# Patient Record
Sex: FEMALE | Race: WHITE | NOT HISPANIC OR LATINO | URBAN - METROPOLITAN AREA
[De-identification: names, ages, dates, MRNs, and addresses within clinical notes are randomized per-mention and may not be internally consistent; named-entity substitution may affect disease eponyms.]

---

## 2017-03-21 ENCOUNTER — APPOINTMENT (RX ONLY)
Dept: URBAN - METROPOLITAN AREA CLINIC 23 | Facility: CLINIC | Age: 57
Setting detail: DERMATOLOGY
End: 2017-03-21

## 2017-03-21 DIAGNOSIS — D485 NEOPLASM OF UNCERTAIN BEHAVIOR OF SKIN: ICD-10-CM

## 2017-03-21 DIAGNOSIS — L82.1 OTHER SEBORRHEIC KERATOSIS: ICD-10-CM

## 2017-03-21 DIAGNOSIS — L82.0 INFLAMED SEBORRHEIC KERATOSIS: ICD-10-CM

## 2017-03-21 DIAGNOSIS — L91.8 OTHER HYPERTROPHIC DISORDERS OF THE SKIN: ICD-10-CM

## 2017-03-21 PROBLEM — J45.909 UNSPECIFIED ASTHMA, UNCOMPLICATED: Status: ACTIVE | Noted: 2017-03-21

## 2017-03-21 PROBLEM — D48.5 NEOPLASM OF UNCERTAIN BEHAVIOR OF SKIN: Status: ACTIVE | Noted: 2017-03-21

## 2017-03-21 PROBLEM — J30.1 ALLERGIC RHINITIS DUE TO POLLEN: Status: ACTIVE | Noted: 2017-03-21

## 2017-03-21 PROCEDURE — ? COUNSELING

## 2017-03-21 PROCEDURE — ? REFERRAL

## 2017-03-21 PROCEDURE — ? DEFER

## 2017-03-21 PROCEDURE — 99242 OFF/OP CONSLTJ NEW/EST SF 20: CPT

## 2017-03-21 ASSESSMENT — LOCATION DETAILED DESCRIPTION DERM
LOCATION DETAILED: RIGHT INFERIOR ANTERIOR NECK
LOCATION DETAILED: LEFT INFERIOR ANTERIOR NECK
LOCATION DETAILED: RIGHT LATERAL FOREHEAD

## 2017-03-21 ASSESSMENT — LOCATION SIMPLE DESCRIPTION DERM
LOCATION SIMPLE: LEFT ANTERIOR NECK
LOCATION SIMPLE: RIGHT FOREHEAD
LOCATION SIMPLE: RIGHT ANTERIOR NECK

## 2017-03-21 ASSESSMENT — LOCATION ZONE DERM
LOCATION ZONE: FACE
LOCATION ZONE: NECK

## 2017-03-21 NOTE — PROCEDURE: DEFER
Procedure To Be Performed At Next Visit: Other
Detail Level: Detailed
Other Procedure: Cryo vs gradle removal

## 2017-04-05 ENCOUNTER — HOSPITAL ENCOUNTER (EMERGENCY)
Age: 57
Discharge: HOME | End: 2017-04-05
Attending: EMERGENCY MEDICINE
Payer: COMMERCIAL

## 2017-04-05 ENCOUNTER — APPOINTMENT (OUTPATIENT)
Dept: GENERAL RADIOLOGY | Age: 57
End: 2017-04-05
Attending: EMERGENCY MEDICINE
Payer: COMMERCIAL

## 2017-04-05 VITALS
HEIGHT: 63 IN | TEMPERATURE: 97.3 F | WEIGHT: 160 LBS | RESPIRATION RATE: 20 BRPM | BODY MASS INDEX: 28.35 KG/M2 | DIASTOLIC BLOOD PRESSURE: 69 MMHG | OXYGEN SATURATION: 97 % | HEART RATE: 95 BPM | SYSTOLIC BLOOD PRESSURE: 114 MMHG

## 2017-04-05 DIAGNOSIS — J45.41 MODERATE PERSISTENT ASTHMA WITH ACUTE EXACERBATION: Primary | ICD-10-CM

## 2017-04-05 LAB
ALBUMIN SERPL BCP-MCNC: 4 G/DL (ref 3.5–5)
ALBUMIN/GLOB SERPL: 1 {RATIO} (ref 1.2–3.5)
ALP SERPL-CCNC: 101 U/L (ref 50–136)
ALT SERPL-CCNC: 34 U/L (ref 12–65)
ANION GAP BLD CALC-SCNC: 10 MMOL/L (ref 7–16)
AST SERPL W P-5'-P-CCNC: 69 U/L (ref 15–37)
BASOPHILS # BLD AUTO: 0 K/UL (ref 0–0.2)
BASOPHILS # BLD: 0 % (ref 0–2)
BILIRUB SERPL-MCNC: 0.9 MG/DL (ref 0.2–1.1)
BUN SERPL-MCNC: 12 MG/DL (ref 6–23)
CALCIUM SERPL-MCNC: 9.4 MG/DL (ref 8.3–10.4)
CHLORIDE SERPL-SCNC: 104 MMOL/L (ref 98–107)
CO2 SERPL-SCNC: 23 MMOL/L (ref 21–32)
CREAT SERPL-MCNC: 0.84 MG/DL (ref 0.6–1)
DIFFERENTIAL METHOD BLD: ABNORMAL
EOSINOPHIL # BLD: 0.1 K/UL (ref 0–0.8)
EOSINOPHIL NFR BLD: 1 % (ref 0.5–7.8)
ERYTHROCYTE [DISTWIDTH] IN BLOOD BY AUTOMATED COUNT: 13.3 % (ref 11.9–14.6)
GLOBULIN SER CALC-MCNC: 4.1 G/DL (ref 2.3–3.5)
GLUCOSE SERPL-MCNC: 140 MG/DL (ref 65–100)
HCT VFR BLD AUTO: 42.2 % (ref 35.8–46.3)
HGB BLD-MCNC: 14.3 G/DL (ref 11.7–15.4)
IMM GRANULOCYTES # BLD: 0 K/UL (ref 0–0.5)
IMM GRANULOCYTES NFR BLD AUTO: 0 % (ref 0–5)
LYMPHOCYTES # BLD AUTO: 33 % (ref 13–44)
LYMPHOCYTES # BLD: 2.9 K/UL (ref 0.5–4.6)
MCH RBC QN AUTO: 28.9 PG (ref 26.1–32.9)
MCHC RBC AUTO-ENTMCNC: 33.9 G/DL (ref 31.4–35)
MCV RBC AUTO: 85.4 FL (ref 79.6–97.8)
MONOCYTES # BLD: 0.5 K/UL (ref 0.1–1.3)
MONOCYTES NFR BLD AUTO: 6 % (ref 4–12)
NEUTS SEG # BLD: 5.2 K/UL (ref 1.7–8.2)
NEUTS SEG NFR BLD AUTO: 60 % (ref 43–78)
PLATELET # BLD AUTO: 296 K/UL (ref 150–450)
PLATELET COMMENTS,PCOM: ADEQUATE
PMV BLD AUTO: 9.7 FL (ref 10.8–14.1)
POTASSIUM SERPL-SCNC: 4.7 MMOL/L (ref 3.5–5.1)
PROT SERPL-MCNC: 8.1 G/DL (ref 6.3–8.2)
RBC # BLD AUTO: 4.94 M/UL (ref 4.05–5.25)
RBC MORPH BLD: ABNORMAL
SODIUM SERPL-SCNC: 137 MMOL/L (ref 136–145)
WBC # BLD AUTO: 8.7 K/UL (ref 4.3–11.1)
WBC MORPH BLD: ABNORMAL

## 2017-04-05 PROCEDURE — 80053 COMPREHEN METABOLIC PANEL: CPT | Performed by: EMERGENCY MEDICINE

## 2017-04-05 PROCEDURE — 74011250636 HC RX REV CODE- 250/636: Performed by: EMERGENCY MEDICINE

## 2017-04-05 PROCEDURE — 96374 THER/PROPH/DIAG INJ IV PUSH: CPT | Performed by: EMERGENCY MEDICINE

## 2017-04-05 PROCEDURE — 74011000250 HC RX REV CODE- 250: Performed by: EMERGENCY MEDICINE

## 2017-04-05 PROCEDURE — 99282 EMERGENCY DEPT VISIT SF MDM: CPT | Performed by: EMERGENCY MEDICINE

## 2017-04-05 PROCEDURE — 71020 XR CHEST PA LAT: CPT

## 2017-04-05 PROCEDURE — 94640 AIRWAY INHALATION TREATMENT: CPT

## 2017-04-05 PROCEDURE — 85025 COMPLETE CBC W/AUTO DIFF WBC: CPT | Performed by: EMERGENCY MEDICINE

## 2017-04-05 RX ORDER — PREDNISONE 20 MG/1
40 TABLET ORAL DAILY
Qty: 10 TAB | Refills: 0 | Status: SHIPPED | OUTPATIENT
Start: 2017-04-05 | End: 2017-04-10

## 2017-04-05 RX ORDER — IPRATROPIUM BROMIDE AND ALBUTEROL SULFATE 2.5; .5 MG/3ML; MG/3ML
3 SOLUTION RESPIRATORY (INHALATION)
Status: COMPLETED | OUTPATIENT
Start: 2017-04-05 | End: 2017-04-05

## 2017-04-05 RX ADMIN — IPRATROPIUM BROMIDE AND ALBUTEROL SULFATE 3 ML: 2.5; .5 SOLUTION RESPIRATORY (INHALATION) at 12:19

## 2017-04-05 RX ADMIN — METHYLPREDNISOLONE SODIUM SUCCINATE 125 MG: 125 INJECTION, POWDER, FOR SOLUTION INTRAMUSCULAR; INTRAVENOUS at 12:31

## 2017-04-05 NOTE — ED TRIAGE NOTES
Pt. With baseline hx of asthma, states she has been feeling worse since Monday. States went to the wellness clinic today and told her to come here. Some hoarseness noted to her voice. No obvious distress.

## 2017-04-05 NOTE — DISCHARGE INSTRUCTIONS

## 2017-04-05 NOTE — ED PROVIDER NOTES
Patient is a 60 y.o. female presenting with shortness of breath. The history is provided by the patient. Shortness of Breath   This is a recurrent problem. The current episode started 2 days ago. The problem has been gradually worsening. Associated symptoms include cough and wheezing. Pertinent negatives include no fever, no headaches, no rhinorrhea, no sore throat, no ear pain, no neck pain, no sputum production, no PND, no chest pain, no vomiting, no abdominal pain and no rash. The problem's precipitants include strong odors. She has tried beta-agonist inhalers and inhaled steroids for the symptoms. The treatment provided mild relief. She has had prior hospitalizations. She has had prior ED visits. She has had no prior ICU admissions. Associated medical issues include asthma. Past Medical History:   Diagnosis Date    Anxiety     Arrhythmia     hx svt    Asthma 2010    Depression     Diabetes mellitus type 2, controlled, without complications ([facility] [address]     Dyslipidemia 2010    Hypertension     Migraines     SVT (supraventricular tachycardia)     Vitamin D deficiency        Past Surgical History:   Procedure Laterality Date    ABDOMEN SURGERY PROC UNLISTED      HX  SECTION      HX LAP CHOLECYSTECTOMY      HX MICHI AND BSO  2001    Hysterectomy    HX TONSILLECTOMY  as child    HX TUBAL LIGATION Bilateral              Family History:   Problem Relation Age of Onset    Cancer Mother      colon    Elevated Lipids Mother     Cancer Father      colon    Diabetes Father      T2DM    Other Brother      sleep apnea       Social History     Social History    Marital status:      Spouse name: N/A    Number of children: N/A    Years of education: N/A     Occupational History    Not on file.      Social History Main Topics    Smoking status: Never Smoker    Smokeless tobacco: Never Used    Alcohol use No    Drug use: No    Sexual activity: No     Other Topics Concern    Not on file     Social History Narrative         ALLERGIES: Codeine and Meperidine    Review of Systems   Constitutional: Negative for chills and fever. HENT: Negative for congestion, ear pain, rhinorrhea and sore throat. Eyes: Negative for photophobia and discharge. Respiratory: Positive for cough, shortness of breath and wheezing. Negative for sputum production. Cardiovascular: Negative for chest pain, palpitations and PND. Gastrointestinal: Negative for abdominal pain, constipation, diarrhea and vomiting. Endocrine: Negative for cold intolerance and heat intolerance. Genitourinary: Negative for dysuria and flank pain. Musculoskeletal: Negative for arthralgias, myalgias and neck pain. Skin: Negative for rash and wound. Allergic/Immunologic: Negative for environmental allergies and food allergies. Neurological: Negative for syncope and headaches. Hematological: Negative for adenopathy. Does not bruise/bleed easily. Psychiatric/Behavioral: Negative for dysphoric mood. The patient is not nervous/anxious. All other systems reviewed and are negative. Vitals:    04/05/17 1141   BP: 157/83   Pulse: 95   Resp: 20   Temp: 97.3 °F (36.3 °C)   SpO2: 100%   Weight: 72.6 kg (160 lb)   Height: 5' 3.25\" (1.607 m)            Physical Exam   Constitutional: She is oriented to person, place, and time. She appears well-developed and well-nourished. She appears distressed. HENT:   Head: Normocephalic and atraumatic. Mouth/Throat: Oropharynx is clear and moist.   Eyes: Conjunctivae and EOM are normal. Pupils are equal, round, and reactive to light. Right eye exhibits no discharge. Left eye exhibits no discharge. No scleral icterus. Neck: Normal range of motion. Neck supple. No thyromegaly present. Cardiovascular: Normal rate, regular rhythm, normal heart sounds and intact distal pulses. Exam reveals no gallop and no friction rub. No murmur heard.   Pulmonary/Chest: Effort normal. No respiratory distress. She has wheezes. She has no rales. She exhibits no tenderness. Abdominal: Soft. Bowel sounds are normal. She exhibits no distension. There is no hepatosplenomegaly. There is no tenderness. There is no rebound and no guarding. No hernia. Musculoskeletal: Normal range of motion. She exhibits no edema or tenderness. Neurological: She is alert and oriented to person, place, and time. No cranial nerve deficit. She exhibits normal muscle tone. Skin: Skin is warm. No rash noted. She is not diaphoretic. No erythema. Psychiatric: She has a normal mood and affect. Her behavior is normal. Judgment and thought content normal.   Nursing note and vitals reviewed. MDM  Number of Diagnoses or Management Options  Moderate persistent asthma with acute exacerbation: established and worsening  Diagnosis management comments: 57-year-old female with a history of asthma. Her breath ×2-3 days. Not responding to her normal nebulizer treatments at home, felt more short of breath when she walked into work today. Patient is now feeling much better after steroids and nebulizer treatment here requesting to be discharged. .  We will start her on a short course of prednisone and have encouraged her to follow-up with her family doctor. I have told her to continue her every 2 hour nebulizer treatments as needed, but to be sure to use at least every 6 treatments for the next 2-3 days. Chest x-ray was unremarkable. Lab work was also unremarkable.        Amount and/or Complexity of Data Reviewed  Clinical lab tests: ordered and reviewed  Tests in the radiology section of CPT®: ordered and reviewed  Tests in the medicine section of CPT®: ordered and reviewed  Review and summarize past medical records: yes    Risk of Complications, Morbidity, and/or Mortality  Presenting problems: high  Diagnostic procedures: moderate  Management options: moderate  General comments: Elements of this note have been dictated via voice recognition software. Text and phrases may be limited by the accuracy of the software. The chart has been reviewed, but errors may still be present.       Patient Progress  Patient progress: improved    ED Course       Procedures

## 2017-04-05 NOTE — LETTER
[address] [facility] EMERGENCY DEPT One [address] [address] 
[phone] Work/School Note Date: 4/5/2017 To Whom It May concern: 
 
Houston Trotter was seen and treated today in the emergency room by the following provider(s): 
Attending Provider: Carmenza Coreas MD.   
 
Houston Trotter may return to work on 4/7/17. Sincerely, Katt Escamilla

## 2017-04-06 ENCOUNTER — PATIENT OUTREACH (OUTPATIENT)
Dept: OTHER | Age: 57
End: 2017-04-06

## 2017-04-07 ENCOUNTER — PATIENT OUTREACH (OUTPATIENT)
Dept: OTHER | Age: 57
End: 2017-04-07

## 2017-04-07 NOTE — PROGRESS NOTES
Transition Of Care Note    Patient discharged from [address] [place] [address] ED for Asthma exacerbation. Medical History:     Past Medical History:   Diagnosis Date    Anxiety     Arrhythmia     hx svt    Asthma 2010    Depression     Diabetes mellitus type 2, controlled, without complications ([facility] [address]     Dyslipidemia 2010    Hypertension     Migraines     SVT (supraventricular tachycardia)     Vitamin D deficiency        Care Manager contacted the patient by telephone to perform post ED discharge assessment. Verified  and zip code with patient as identifiers. Provided introduction to self, and explanation of the Nurse Care Manager role. Medication:   Performed medication reconciliation with patient, and patient verbalizes understanding of administration of home medications. There were no barriers to obtaining medications identified at this time. Support system:  patient    Discharge Instructions :  Reviewed discharge instructions with patient. Patient verbalizes understanding of discharge instructions and follow-up care. Red Flags:  Worsening shortness of breath    Advance Care Planning:   Patient was offered the opportunity to discuss advance care planning:  no     Does patient have an Advance Directive:  no   If no, did you provide information on Caring Connections?  no     PCP/Specialist follow up: Patient is not scheduled to follow up with Soumya Couch MD, states she contacted him regarding recent ED visit, and was recommended to continue nebulizer treatment, and call if symptoms do not improve. States symptoms have improved with tightness to chest, but feels \"coarse, or hoarse. \"  Reviewed red flags with patient, and patient verbalizes understanding. Patient given an opportunity to ask questions. No other clinical/social/functional needs noted. The patient agrees to contact the PCP office for questions related to their healthcare.   The patient expressed thanks, offered no additional questions and ended the call. This CM to follow up in 2 weeks. Patient prefers e-mail for communication of resources and information.

## 2017-04-21 ENCOUNTER — PATIENT OUTREACH (OUTPATIENT)
Dept: OTHER | Age: 57
End: 2017-04-21

## 2017-04-25 PROBLEM — R55 SYNCOPE: Status: ACTIVE | Noted: 2017-04-25

## 2017-04-25 PROBLEM — M54.50 ACUTE MIDLINE LOW BACK PAIN WITHOUT SCIATICA: Status: ACTIVE | Noted: 2017-04-25

## 2017-04-28 ENCOUNTER — PATIENT OUTREACH (OUTPATIENT)
Dept: OTHER | Age: 57
End: 2017-04-28

## 2017-04-28 NOTE — PROGRESS NOTES
Follow up call to patient. Two patient identifiers verified. Patient had follow-up with PCP on 4/25, recently having falls, with on occurring on her patio. Patient states she does not know cause of fall, but states she feels like she is \"passing out\" momentarily. Referral to neurology, with appt on 5/15, MRI and ultrasound scheduled on 5/11, possible L carotid bruit at appointment. X-Ray and labs also ordered. Patient denies needing assistance with follow-up, states only concern at this time is [place] my job. \" States she has begun paperwork for FMLA, this CM encouraged patient to use the AskHR feature for any assistance during process. This CM to follow up in one week.

## 2017-05-05 ENCOUNTER — PATIENT OUTREACH (OUTPATIENT)
Dept: OTHER | Age: 57
End: 2017-05-05

## 2017-05-11 ENCOUNTER — HOSPITAL ENCOUNTER (OUTPATIENT)
Dept: MRI IMAGING | Age: 57
Discharge: HOME | End: 2017-05-11
Attending: FAMILY MEDICINE
Payer: COMMERCIAL

## 2017-05-11 ENCOUNTER — HOSPITAL ENCOUNTER (OUTPATIENT)
Dept: ULTRASOUND IMAGING | Age: 57
Discharge: HOME | End: 2017-05-11
Attending: FAMILY MEDICINE
Payer: COMMERCIAL

## 2017-05-11 VITALS — BODY MASS INDEX: 28.12 KG/M2 | WEIGHT: 160 LBS

## 2017-05-11 DIAGNOSIS — R55 SYNCOPE, UNSPECIFIED SYNCOPE TYPE: ICD-10-CM

## 2017-05-11 PROCEDURE — A9577 INJ MULTIHANCE: HCPCS | Performed by: FAMILY MEDICINE

## 2017-05-11 PROCEDURE — 70553 MRI BRAIN STEM W/O & W/DYE: CPT

## 2017-05-11 PROCEDURE — 93880 EXTRACRANIAL BILAT STUDY: CPT

## 2017-05-11 PROCEDURE — 74011250636 HC RX REV CODE- 250/636: Performed by: FAMILY MEDICINE

## 2017-05-11 RX ORDER — SODIUM CHLORIDE 0.9 % (FLUSH) 0.9 %
10 SYRINGE (ML) INJECTION
Status: COMPLETED | OUTPATIENT
Start: 2017-05-11 | End: 2017-05-11

## 2017-05-11 RX ADMIN — GADOBENATE DIMEGLUMINE 15 ML: 529 INJECTION, SOLUTION INTRAVENOUS at 10:00

## 2017-05-11 RX ADMIN — Medication 10 ML: at 10:00

## 2017-05-12 ENCOUNTER — PATIENT OUTREACH (OUTPATIENT)
Dept: OTHER | Age: 57
End: 2017-05-12

## 2017-05-24 ENCOUNTER — PATIENT OUTREACH (OUTPATIENT)
Dept: OTHER | Age: 57
End: 2017-05-24

## 2017-05-24 NOTE — PROGRESS NOTES
Resolving current episode for case management due to patient lost to follow up. Patient has not been reached after repeated calls and letters. Final call made today to attempt to contact and discreet message left on voicemail. Letter sent to patient notifying completion of services due to unable to reach. This writer's contact information and information regarding program services included in materials sent. Goals updated to reflect current status as appropriate. Will remain available should patient request re-initiation of case management or transitions of care services.

## 2017-05-24 NOTE — LETTER
5/24/2017 5:18 PM 
 
Ms. Vinicius Valdes [facility] [address] Dear Ms. Brooks Cheek, My name is Mary Nice , Employee Care Manager for [employer], and I have been trying to reach you. The Employee Care  is a free-of-charge, confidential service provided to our employees and their family members covered by the [employer]. Part of my job is to follow up with members who have recently been in the hospital or emergency room, to help them coordinate their care and answer questions they may have about their visit. Due to not being able to reach you within 30 days, I am closing out the current program, but will remain available to you should you have any questions. As healthcare providers, we know that patients do better when they have close follow up with a primary care provider (PCP), especially after a hospital or emergency department visit. If you do not have a PCP, I can help you find one that is convenient to you and covered by your insurance. I can also help you understand any after visit instructions, such as what symptoms to watch out for, or any new or changed medications. Remember that you can access your After Visit Summary by logging into your [employer] account. If you do not have a [employer] account, I can help you request access. Our program is designed to provide you with the opportunity to have a [employer] care manager partner with you for your healthcare needs. Please contact me at the below number if I can provide you with assistance for any of the above services. Sincerely, Mary Nice, RN 
[phone]

## 2017-05-25 ENCOUNTER — HOSPITAL ENCOUNTER (OUTPATIENT)
Dept: MRI IMAGING | Age: 57
Discharge: HOME | End: 2017-05-25
Attending: PSYCHIATRY & NEUROLOGY
Payer: COMMERCIAL

## 2017-05-25 DIAGNOSIS — M54.16 LUMBAR RADICULAR PAIN: ICD-10-CM

## 2017-05-25 DIAGNOSIS — R55 SYNCOPE, UNSPECIFIED SYNCOPE TYPE: ICD-10-CM

## 2017-05-25 PROCEDURE — 72148 MRI LUMBAR SPINE W/O DYE: CPT

## 2017-06-07 PROBLEM — R53.83 FATIGUE: Status: ACTIVE | Noted: 2017-06-07

## 2017-06-13 ENCOUNTER — HOSPITAL ENCOUNTER (OUTPATIENT)
Dept: MAMMOGRAPHY | Age: 57
Discharge: HOME | End: 2017-06-13
Attending: FAMILY MEDICINE
Payer: COMMERCIAL

## 2017-06-13 DIAGNOSIS — Z12.39 BREAST SCREENING: ICD-10-CM

## 2017-06-13 PROCEDURE — 77067 SCR MAMMO BI INCL CAD: CPT

## 2017-07-18 ENCOUNTER — HOSPITAL ENCOUNTER (OUTPATIENT)
Dept: LAB | Age: 57
Discharge: HOME | End: 2017-07-18
Payer: COMMERCIAL

## 2017-07-18 DIAGNOSIS — R53.82 CHRONIC FATIGUE: ICD-10-CM

## 2017-07-18 LAB
TSH SERPL DL<=0.005 MIU/L-ACNC: 1.59 UIU/ML (ref 0.36–3.74)
VIT B12 SERPL-MCNC: 361 PG/ML (ref 193–986)

## 2017-07-18 PROCEDURE — 84443 ASSAY THYROID STIM HORMONE: CPT | Performed by: FAMILY MEDICINE

## 2017-07-18 PROCEDURE — 82607 VITAMIN B-12: CPT | Performed by: FAMILY MEDICINE

## 2017-07-18 PROCEDURE — 36415 COLL VENOUS BLD VENIPUNCTURE: CPT | Performed by: FAMILY MEDICINE

## 2017-07-23 PROCEDURE — 99283 EMERGENCY DEPT VISIT LOW MDM: CPT | Performed by: EMERGENCY MEDICINE

## 2017-07-23 PROCEDURE — 96372 THER/PROPH/DIAG INJ SC/IM: CPT | Performed by: EMERGENCY MEDICINE

## 2017-07-24 ENCOUNTER — HOSPITAL ENCOUNTER (EMERGENCY)
Age: 57
Discharge: HOME | End: 2017-07-24
Attending: EMERGENCY MEDICINE
Payer: COMMERCIAL

## 2017-07-24 ENCOUNTER — APPOINTMENT (OUTPATIENT)
Dept: GENERAL RADIOLOGY | Age: 57
End: 2017-07-24
Attending: EMERGENCY MEDICINE
Payer: COMMERCIAL

## 2017-07-24 ENCOUNTER — APPOINTMENT (OUTPATIENT)
Dept: ULTRASOUND IMAGING | Age: 57
End: 2017-07-24
Attending: EMERGENCY MEDICINE
Payer: COMMERCIAL

## 2017-07-24 VITALS
TEMPERATURE: 98.3 F | SYSTOLIC BLOOD PRESSURE: 136 MMHG | OXYGEN SATURATION: 97 % | HEIGHT: 63 IN | RESPIRATION RATE: 18 BRPM | WEIGHT: 160 LBS | BODY MASS INDEX: 28.35 KG/M2 | DIASTOLIC BLOOD PRESSURE: 95 MMHG | HEART RATE: 89 BPM

## 2017-07-24 DIAGNOSIS — M54.32 LEFT SIDED SCIATICA: Primary | ICD-10-CM

## 2017-07-24 PROCEDURE — 74011250636 HC RX REV CODE- 250/636: Performed by: EMERGENCY MEDICINE

## 2017-07-24 PROCEDURE — 93971 EXTREMITY STUDY: CPT

## 2017-07-24 PROCEDURE — 73502 X-RAY EXAM HIP UNI 2-3 VIEWS: CPT

## 2017-07-24 RX ORDER — CYCLOBENZAPRINE HCL 10 MG
10 TABLET ORAL
Qty: 15 TAB | Refills: 0 | Status: SHIPPED | OUTPATIENT
Start: 2017-07-24 | End: 2017-08-31

## 2017-07-24 RX ORDER — TRAMADOL HYDROCHLORIDE 50 MG/1
50-100 TABLET ORAL
Qty: 24 TAB | Refills: 0 | Status: SHIPPED | OUTPATIENT
Start: 2017-07-24 | End: 2017-08-31

## 2017-07-24 RX ORDER — PREDNISONE 20 MG/1
40 TABLET ORAL DAILY
Qty: 10 TAB | Refills: 0 | Status: SHIPPED | OUTPATIENT
Start: 2017-07-24 | End: 2017-07-29

## 2017-07-24 RX ORDER — KETOROLAC TROMETHAMINE 30 MG/ML
60 INJECTION, SOLUTION INTRAMUSCULAR; INTRAVENOUS
Status: COMPLETED | OUTPATIENT
Start: 2017-07-24 | End: 2017-07-24

## 2017-07-24 RX ORDER — KETOROLAC TROMETHAMINE 30 MG/ML
INJECTION, SOLUTION INTRAMUSCULAR; INTRAVENOUS
Status: DISCONTINUED
Start: 2017-07-24 | End: 2017-07-24 | Stop reason: HOSPADM

## 2017-07-24 RX ADMIN — KETOROLAC TROMETHAMINE 60 MG: 30 INJECTION, SOLUTION INTRAMUSCULAR at 01:11

## 2017-07-24 NOTE — LETTER
[address] [facility] EMERGENCY DEPT One [address] [address] 
[phone] Work/School Note Date: 7/23/2017 To Whom It May concern: 
 
Daniela Menon was seen and treated today in the emergency room by the following provider(s): 
Attending Provider: Mónica Quiles MD.   
 
Daniela Menon may return to work on 7/26 Iwona Armenta Sincerely, Mónica Quiles MD

## 2017-07-24 NOTE — DISCHARGE INSTRUCTIONS
Rest.  Heating pad or hot water bottle. Call your doctor to recheck next week. Recheck sooner for rash or high fever. If you are not on a muscle relaxant currently, fill prescription for cyclobenzaprine. Sciatica: Care Instructions  Your Care Instructions    Sciatica (say \"xlr-BC-zz-kuh\") is an irritation of one of the sciatic nerves, which come from the spinal cord in the lower back. The sciatic nerves and their branches extend down through the buttock to the foot. Sciatica can develop when an injured disc in the back presses against a spinal nerve root. Its main symptom is pain, numbness, or weakness that is often worse in the leg or foot than in the back. Sciatica often will improve and go away with time. Early treatment usually includes medicines and exercises to relieve pain. Follow-up care is a key part of your treatment and safety. Be sure to make and go to all appointments, and call your doctor if you are having problems. It's also a good idea to know your test results and keep a list of the medicines you take. How can you care for yourself at home? · Take pain medicines exactly as directed. ¨ If the doctor gave you a prescription medicine for pain, take it as prescribed. ¨ If you are not taking a prescription pain medicine, ask your doctor if you can take an over-the-counter medicine. · Use heat or ice to relieve pain. ¨ To apply heat, put a warm water bottle, heating pad set on low, or warm cloth on your back. Do not go to sleep with a heating pad on your skin. ¨ To use ice, put ice or a cold pack on the area for 10 to 20 minutes at a time. Put a thin cloth between the ice and your skin. · Avoid sitting if possible, unless it feels better than standing. · Alternate lying down with short walks. Increase your walking distance as you are able to without making your symptoms worse. · Do not do anything that makes your symptoms worse. When should you call for help?   Call 911 anytime you think you may need emergency care. For example, call if:  · You are unable to move a leg at all. Call your doctor now or seek immediate medical care if:  · You have new or worse symptoms in your legs or buttocks. Symptoms may include:  ¨ Numbness or tingling. ¨ Weakness. ¨ Pain. · You lose bladder or bowel control. Watch closely for changes in your health, and be sure to contact your doctor if:  · You are not getting better as expected. Where can you learn more? Go to http://lyndon-mary ellen.info/. Enter [phone] in the search box to learn more about \"Sciatica: Care Instructions. \"  Current as of: March 21, 2017  Content Version: 11.3  © 2565-5407 [employer]. Care instructions adapted under license by [employer] (which disclaims liability or warranty for this information). If you have questions about a medical condition or this instruction, always ask your healthcare professional. [place] [address] any warranty or liability for your use of this information.

## 2017-07-24 NOTE — ED NOTES
Pt discharged instructions given pt v\u to instructions pt in no acute distress at discharge.  Pt has son driving her home

## 2017-07-24 NOTE — ED TRIAGE NOTES
Patient to ed with c/o left upper thigh/groin pain since yesterday--patient denies recent injury--patient reports pain worse with standing/ambulation

## 2017-07-24 NOTE — ED PROVIDER NOTES
HPI Comments: 67-year-old female with 36 hour history of pain in the left hip and groin and thigh. No trauma. Has not noticed any swelling or redness. Does have some back pain but this more of a long-term problem. No change in bowel or bladder. No numbness in her leg. Does have some problems with long-term weakness in the left leg and does see a neurologist.  Question of some mild cervical spine disease that is causing her left leg troubles in the past with regard to weakness. No abdominal pain and swelling    Patient is a 60 y.o. female presenting with hip pain. The history is provided by the patient. Hip Injury    This is a new problem. The current episode started yesterday. The problem occurs constantly. The problem has been gradually worsening. The pain is present in the left upper leg and left hip. The pain is moderate. Associated symptoms include limited range of motion, back pain and neck pain. Pertinent negatives include no numbness. There has been no history of extremity trauma.         Past Medical History:   Diagnosis Date    Anxiety     Arrhythmia     hx svt    Asthma 2010    Depression     Diabetes mellitus type 2, controlled, without complications ([facility] [address]     Dyslipidemia 2010    Hypertension     Migraines     SVT (supraventricular tachycardia) (HCC)     Vitamin D deficiency        Past Surgical History:   Procedure Laterality Date    ABDOMEN SURGERY PROC UNLISTED      HX  SECTION      HX LAP CHOLECYSTECTOMY      HX MICHI AND BSO  2001    Hysterectomy    HX TONSILLECTOMY  as child    HX TUBAL LIGATION Bilateral              Family History:   Problem Relation Age of Onset    Cancer Mother      colon    Elevated Lipids Mother     Cancer Father      colon    Diabetes Father      T2DM    Other Brother      sleep apnea       Social History     Social History    Marital status:      Spouse name: N/A    Number of children: N/A    Years of education: N/A     Occupational History    Not on file. Social History Main Topics    Smoking status: Never Smoker    Smokeless tobacco: Never Used    Alcohol use No    Drug use: No    Sexual activity: No     Other Topics Concern    Not on file     Social History Narrative         ALLERGIES: Codeine and Meperidine    Review of Systems   Constitutional: Negative for chills and fever. Gastrointestinal: Negative for abdominal pain, nausea and vomiting. Genitourinary: Negative for difficulty urinating, flank pain and hematuria. Musculoskeletal: Positive for back pain and neck pain. Neurological: Positive for weakness. Negative for numbness. Vitals:    07/23/17 2356   BP: (!) 138/96   Pulse: (!) 101   Resp: 18   Temp: 98.3 °F (36.8 °C)   SpO2: 97%   Weight: 72.6 kg (160 lb)   Height: 5' 3\" (1.6 m)            Physical Exam   Constitutional: She appears well-developed and well-nourished. No distress. Cardiovascular:   Pulses:       Dorsalis pedis pulses are 2+ on the right side, and 2+ on the left side. Musculoskeletal:        Left hip: She exhibits tenderness and bony tenderness. She exhibits normal range of motion and no swelling. Left upper leg: She exhibits tenderness. She exhibits no bony tenderness. Legs:  Mild pain with internal/external rotation left hip. No hernia palpated. Tenderness about the femoral triangle anteriorly along with the trochanter laterally and in the region of the sciatic nerve posteriorly. Also some tenderness behind the left knee. No effusion. Neurological:   Reflex Scores:       Patellar reflexes are 1+ on the right side and 1+ on the left side. Achilles reflexes are 1+ on the right side and 1+ on the left side. No weakness. Skin: Skin is warm and dry. Nursing note and vitals reviewed. MDM  Number of Diagnoses or Management Options  Diagnosis management comments: Plain films to assess left hip.   I'll ultrasound to assess for DVT.       Amount and/or Complexity of Data Reviewed  Tests in the radiology section of CPT®: ordered and reviewed  Independent visualization of images, tracings, or specimens: yes    Risk of Complications, Morbidity, and/or Mortality  Presenting problems: moderate  Diagnostic procedures: minimal  Management options: low    Patient Progress  Patient progress: stable    ED Course       Procedures      Results Include:    No results found for this or any previous visit (from the past 24 hour(s)). Xr Hip Lt W Or Wo Pelv 2-3 Vws    Result Date: 7/24/2017  Left Hip INDICATION: Left hip pain. COMPARISON: None TECHNIQUE: Three views of the left hip were obtained. FINDINGS: There is no left hip fracture or dislocation. Left hip joint space is preserved. Visualized right hip is unremarkable. SI joints and pubic symphysis are intact. No acute pelvic fracture. IMPRESSION: No acute fracture. Duplex Lower Ext Venous Left    Result Date: 7/24/2017  Left lower extremity duplex venous doppler exam. INDICATION: Pain. . COMPARISON: TECHNIQUE: Gray-scale ultrasound with and without compression and color Doppler evaluation were performed of the deep veins of the left lower extremity from the level of the left common femoral vein to the level of the left popliteal vein. Peroneal and posterior tibial veins also interrogated. FINDINGS: The left common femoral vein, left femoral vein, peroneal, posterior tibial, and left popliteal veins are compressible and opacify with color at color Doppler evaluation. There is no evidence of deep vein thrombosis. IMPRESSION: Negative for DVT. Suspect sciatica more likely than bursitis.

## 2017-07-25 ENCOUNTER — PATIENT OUTREACH (OUTPATIENT)
Dept: OTHER | Age: 57
End: 2017-07-25

## 2017-07-25 NOTE — LETTER
7/25/2017 2:38 PM 
 
Ms. Ciara Heart [address] [place] [id number] Dear Ms. Zuletaannabella Hogue, My name is Andrae Álvarez , Employee Care Manager for [facility], and I have been trying to reach you. The Employee Care  is a free-of-charge, confidential service provided to our employees and their family members covered by the [employer]. Part of my job is to follow up with members who have recently been in the hospital or emergency room, to help them coordinate their care and answer questions they may have about their visit. I am able to provide assistance with medication questions, scheduling needed follow-up appointments, and arranging services like home health or home medical equipment. I can also provide education regarding your hospital or ER visit as well as your medical conditions. As healthcare providers, we know that patients do better when they have close follow up with a primary care provider (PCP), especially after a hospital or emergency department visit. If you do not have a PCP, I can help you find one that is convenient to you and covered by your insurance. I can also help you understand any after visit instructions, such as what symptoms to watch out for, or any new or changed medications. Remember that you can access your After Visit Summary by logging into your [employer] account. If you do not have a [employer] account, I can help you request access. Our program is designed to provide you with the opportunity to have a [facility] care manager partner with you for your healthcare needs. Please contact me at the below number if I can provide you with assistance for any of the above services. Sincerely, Andrae Álvarez, RN 
[phone]

## 2017-07-25 NOTE — PROGRESS NOTES
Patient on discharge report dated 7/25. Unable to leave message on voicemail. Will attempt to contact again. Will send UTR letter. Need to complete post-discharge assessment.

## 2017-07-26 ENCOUNTER — PATIENT OUTREACH (OUTPATIENT)
Dept: OTHER | Age: 57
End: 2017-07-26

## 2017-07-26 NOTE — PROGRESS NOTES
Second attempt to reach patient for [facility], and discharge assessment. Unable to leave , letter mailed on 7/25.

## 2017-08-07 ENCOUNTER — HOSPITAL ENCOUNTER (INPATIENT)
Age: 57
LOS: 1 days | Discharge: HOME | DRG: 683 | End: 2017-08-09
Attending: EMERGENCY MEDICINE | Admitting: HOSPITALIST
Payer: COMMERCIAL

## 2017-08-07 ENCOUNTER — APPOINTMENT (OUTPATIENT)
Dept: GENERAL RADIOLOGY | Age: 57
DRG: 683 | End: 2017-08-07
Attending: EMERGENCY MEDICINE
Payer: COMMERCIAL

## 2017-08-07 ENCOUNTER — APPOINTMENT (OUTPATIENT)
Dept: CT IMAGING | Age: 57
DRG: 683 | End: 2017-08-07
Attending: EMERGENCY MEDICINE
Payer: COMMERCIAL

## 2017-08-07 DIAGNOSIS — E87.20 LACTIC ACIDOSIS: ICD-10-CM

## 2017-08-07 DIAGNOSIS — E86.0 DEHYDRATION: ICD-10-CM

## 2017-08-07 DIAGNOSIS — R55 SYNCOPE AND COLLAPSE: Primary | ICD-10-CM

## 2017-08-07 DIAGNOSIS — R19.7 DIARRHEA, UNSPECIFIED TYPE: ICD-10-CM

## 2017-08-07 LAB
ALBUMIN SERPL BCP-MCNC: 3.8 G/DL (ref 3.5–5)
ALBUMIN/GLOB SERPL: 1.1 {RATIO} (ref 1.2–3.5)
ALP SERPL-CCNC: 103 U/L (ref 50–136)
ALT SERPL-CCNC: 22 U/L (ref 12–65)
ANION GAP BLD CALC-SCNC: 14 MMOL/L (ref 7–16)
AST SERPL W P-5'-P-CCNC: 19 U/L (ref 15–37)
BASOPHILS # BLD AUTO: 0 K/UL (ref 0–0.2)
BASOPHILS # BLD: 0 % (ref 0–2)
BILIRUB SERPL-MCNC: 0.8 MG/DL (ref 0.2–1.1)
BUN SERPL-MCNC: 22 MG/DL (ref 6–23)
CALCIUM SERPL-MCNC: 8.7 MG/DL (ref 8.3–10.4)
CHLORIDE SERPL-SCNC: 101 MMOL/L (ref 98–107)
CO2 SERPL-SCNC: 25 MMOL/L (ref 21–32)
CREAT SERPL-MCNC: 1.41 MG/DL (ref 0.6–1)
DIFFERENTIAL METHOD BLD: ABNORMAL
EOSINOPHIL # BLD: 0 K/UL (ref 0–0.8)
EOSINOPHIL NFR BLD: 0 % (ref 0.5–7.8)
ERYTHROCYTE [DISTWIDTH] IN BLOOD BY AUTOMATED COUNT: 13.5 % (ref 11.9–14.6)
GLOBULIN SER CALC-MCNC: 3.6 G/DL (ref 2.3–3.5)
GLUCOSE SERPL-MCNC: 209 MG/DL (ref 65–100)
HCT VFR BLD AUTO: 40.3 % (ref 35.8–46.3)
HGB BLD-MCNC: 13.9 G/DL (ref 11.7–15.4)
IMM GRANULOCYTES # BLD: 0 K/UL (ref 0–0.5)
IMM GRANULOCYTES NFR BLD AUTO: 0.2 % (ref 0–5)
LACTATE BLD-SCNC: 3 MMOL/L (ref 0.5–1.9)
LACTATE BLD-SCNC: 3.2 MMOL/L (ref 0.5–1.9)
LIPASE SERPL-CCNC: 174 U/L (ref 73–393)
LYMPHOCYTES # BLD AUTO: 5 % (ref 13–44)
LYMPHOCYTES # BLD: 0.8 K/UL (ref 0.5–4.6)
MAGNESIUM SERPL-MCNC: 2.1 MG/DL (ref 1.8–2.4)
MCH RBC QN AUTO: 28.2 PG (ref 26.1–32.9)
MCHC RBC AUTO-ENTMCNC: 34.5 G/DL (ref 31.4–35)
MCV RBC AUTO: 81.7 FL (ref 79.6–97.8)
MONOCYTES # BLD: 0.5 K/UL (ref 0.1–1.3)
MONOCYTES NFR BLD AUTO: 3 % (ref 4–12)
NEUTS SEG # BLD: 13.4 K/UL (ref 1.7–8.2)
NEUTS SEG NFR BLD AUTO: 92 % (ref 43–78)
PLATELET # BLD AUTO: 181 K/UL (ref 150–450)
PMV BLD AUTO: 10.2 FL (ref 10.8–14.1)
POTASSIUM SERPL-SCNC: 2.7 MMOL/L (ref 3.5–5.1)
PROCALCITONIN SERPL-MCNC: 0.1 NG/ML
PROT SERPL-MCNC: 7.4 G/DL (ref 6.3–8.2)
RBC # BLD AUTO: 4.93 M/UL (ref 4.05–5.25)
SODIUM SERPL-SCNC: 140 MMOL/L (ref 136–145)
TROPONIN I BLD-MCNC: 0 NG/ML (ref 0–0.08)
WBC # BLD AUTO: 14.8 K/UL (ref 4.3–11.1)

## 2017-08-07 PROCEDURE — 84484 ASSAY OF TROPONIN QUANT: CPT

## 2017-08-07 PROCEDURE — 96361 HYDRATE IV INFUSION ADD-ON: CPT | Performed by: EMERGENCY MEDICINE

## 2017-08-07 PROCEDURE — 93005 ELECTROCARDIOGRAM TRACING: CPT | Performed by: EMERGENCY MEDICINE

## 2017-08-07 PROCEDURE — 74177 CT ABD & PELVIS W/CONTRAST: CPT

## 2017-08-07 PROCEDURE — 83735 ASSAY OF MAGNESIUM: CPT | Performed by: EMERGENCY MEDICINE

## 2017-08-07 PROCEDURE — 74011000250 HC RX REV CODE- 250: Performed by: EMERGENCY MEDICINE

## 2017-08-07 PROCEDURE — 74011636320 HC RX REV CODE- 636/320: Performed by: EMERGENCY MEDICINE

## 2017-08-07 PROCEDURE — 84145 PROCALCITONIN (PCT): CPT | Performed by: EMERGENCY MEDICINE

## 2017-08-07 PROCEDURE — 96375 TX/PRO/DX INJ NEW DRUG ADDON: CPT | Performed by: EMERGENCY MEDICINE

## 2017-08-07 PROCEDURE — 71010 XR CHEST PORT: CPT

## 2017-08-07 PROCEDURE — 96367 TX/PROPH/DG ADDL SEQ IV INF: CPT | Performed by: EMERGENCY MEDICINE

## 2017-08-07 PROCEDURE — 74011000258 HC RX REV CODE- 258: Performed by: EMERGENCY MEDICINE

## 2017-08-07 PROCEDURE — 87040 BLOOD CULTURE FOR BACTERIA: CPT | Performed by: EMERGENCY MEDICINE

## 2017-08-07 PROCEDURE — 96366 THER/PROPH/DIAG IV INF ADDON: CPT | Performed by: EMERGENCY MEDICINE

## 2017-08-07 PROCEDURE — 74011250636 HC RX REV CODE- 250/636: Performed by: EMERGENCY MEDICINE

## 2017-08-07 PROCEDURE — 96365 THER/PROPH/DIAG IV INF INIT: CPT | Performed by: EMERGENCY MEDICINE

## 2017-08-07 PROCEDURE — 80053 COMPREHEN METABOLIC PANEL: CPT | Performed by: EMERGENCY MEDICINE

## 2017-08-07 PROCEDURE — 99285 EMERGENCY DEPT VISIT HI MDM: CPT | Performed by: EMERGENCY MEDICINE

## 2017-08-07 PROCEDURE — 87205 SMEAR GRAM STAIN: CPT | Performed by: EMERGENCY MEDICINE

## 2017-08-07 PROCEDURE — 83690 ASSAY OF LIPASE: CPT | Performed by: EMERGENCY MEDICINE

## 2017-08-07 PROCEDURE — 85025 COMPLETE CBC W/AUTO DIFF WBC: CPT | Performed by: EMERGENCY MEDICINE

## 2017-08-07 PROCEDURE — 83605 ASSAY OF LACTIC ACID: CPT

## 2017-08-07 RX ORDER — METRONIDAZOLE 500 MG/100ML
500 INJECTION, SOLUTION INTRAVENOUS EVERY 8 HOURS
Status: DISCONTINUED | OUTPATIENT
Start: 2017-08-07 | End: 2017-08-09 | Stop reason: HOSPADM

## 2017-08-07 RX ORDER — POTASSIUM CHLORIDE 20MEQ/15ML
40 LIQUID (ML) ORAL
Status: COMPLETED | OUTPATIENT
Start: 2017-08-07 | End: 2017-08-08

## 2017-08-07 RX ORDER — SODIUM CHLORIDE 0.9 % (FLUSH) 0.9 %
10 SYRINGE (ML) INJECTION
Status: COMPLETED | OUTPATIENT
Start: 2017-08-07 | End: 2017-08-07

## 2017-08-07 RX ORDER — FENTANYL CITRATE 50 UG/ML
50 INJECTION, SOLUTION INTRAMUSCULAR; INTRAVENOUS
Status: COMPLETED | OUTPATIENT
Start: 2017-08-07 | End: 2017-08-07

## 2017-08-07 RX ADMIN — METRONIDAZOLE 500 MG: 500 INJECTION, SOLUTION INTRAVENOUS at 23:58

## 2017-08-07 RX ADMIN — FENTANYL CITRATE 50 MCG: 50 INJECTION, SOLUTION INTRAMUSCULAR; INTRAVENOUS at 21:59

## 2017-08-07 RX ADMIN — SODIUM CHLORIDE 1000 ML: 900 INJECTION, SOLUTION INTRAVENOUS at 21:25

## 2017-08-07 RX ADMIN — IOPAMIDOL 100 ML: 755 INJECTION, SOLUTION INTRAVENOUS at 23:07

## 2017-08-07 RX ADMIN — SODIUM CHLORIDE 1000 ML: 900 INJECTION, SOLUTION INTRAVENOUS at 21:07

## 2017-08-07 RX ADMIN — SODIUM CHLORIDE 100 ML: 900 INJECTION, SOLUTION INTRAVENOUS at 23:07

## 2017-08-07 RX ADMIN — CEFTRIAXONE 2 G: 2 INJECTION, POWDER, FOR SOLUTION INTRAMUSCULAR; INTRAVENOUS at 22:00

## 2017-08-07 RX ADMIN — Medication 10 ML: at 23:07

## 2017-08-07 NOTE — IP AVS SNAPSHOT
Scarlet Smith 
 
 
 [address] [facility] 
[phone] Patient: Daniela Menon MRN: [id number] PLZ:[id number]/80/[id number] You are allergic to the following Allergen Reactions Codeine Itching Meperidine Other (comments) Hallucinations Recent Documentation Height Weight BMI OB Status Smoking Status 1.6 m 72.6 kg 28.34 kg/m2 Hysterectomy Never Smoker Unresulted Labs Order Current Status CULTURE, BLOOD Preliminary result Emergency Contacts Name Discharge Info Relation Home Work Mobile Jorge Godfrey DISCHARGE CAREGIVER [3] Child [2] [phone] About your hospitalization You were admitted on:  August 8, 2017 You last received care in the:  [facility] 6 MED SURG You were discharged on:  August 9, 2017 Unit phone number:  [phone] Why you were hospitalized Your primary diagnosis was:  Not on File Your diagnoses also included:  Hypotension, Volume Depletion, Viral Gastroenteritis, Acute Renal Failure (Arf) (Hcc), Sirs (Systemic Inflammatory Response Syndrome) (Hcc) Providers Seen During Your Hospitalizations Provider Role Specialty Primary office phone Da Bruce MD Attending Provider Emergency Medicine [phone] Layne Phillips MD Attending Provider Internal Medicine [phone] Your Primary Care Physician (PCP) Primary Care Physician Office Phone Office Fax [facility] [phone] Follow-up Information Follow up With Details Comments Contact Info Duane Lyon MD On 8/17/2017 at 10:45 [address] [place] [address] [id number] [phone] Your Appointments Wednesday August 09, 2017  8:00 PM EDT  
MR C SPINE WO CON with [facility] MRI UNIT 1 4 [facility] MRI ([address] [place] [address]) [address] [facility]  
[phone] IMPLANTS - Bring information (ID card) for any medically implanted devices. - Patients with a history of metal in eyes will require orbit x-rays for clearance prior to MRI  PATIENT ARRIVAL - Please arrive 30 minutes early to check in. Wednesday August 09, 2017  8:45 PM EDT  
MR T SPINE WO CON with [facility] MRI UNIT 1 [facility] MRI ([address] [place] [address]) [address] [facility]  
[phone] IMPLANTS - Bring information (ID card) for any medically implanted devices. - Patients with a history of metal in eyes will require orbit x-rays for clearance prior to MRI  PATIENT ARRIVAL - Please arrive 30 minutes early to check in. Thursday August 17, 2017 10:45 AM EDT SHORT with Driss Nj MD  
Formerly [facility], [facility] ([facility], [place] [facility]) [address]  
[phone] Current Discharge Medication List  
  
CONTINUE these medications which have CHANGED Dose & Instructions Dispensing Information Comments Morning Noon Evening Bedtime  
 carisoprodol 350 mg tablet Commonly known as:  SOMA What changed:   
- when to take this 
- reasons to take this Your next dose is:  Resume normal schedule Dose:  350 mg Take 1 Tab by mouth four (4) times daily. Max Daily Amount: 1,400 mg. Quantity:  20 Tab Refills:  0 CONTINUE these medications which have NOT CHANGED Dose & Instructions Dispensing Information Comments Morning Noon Evening Bedtime  
 albuterol 90 mcg/actuation inhaler Commonly known as:  VENTOLIN HFA Your next dose is:  As needed Dose:  2 Puff Take 2 Puffs by inhalation every four (4) hours as needed for Wheezing. Quantity:  1 Inhaler Refills:  11  
     
   
   
   
  
 atorvastatin 20 mg tablet Commonly known as:  LIPITOR Your next dose is:  8/10  Dose:  20 mg  
 Take 1 Tab by mouth daily. Quantity:  90 Tab Refills:  1  
     
   
   
   
  
 cyclobenzaprine 10 mg tablet Commonly known as:  FLEXERIL Your next dose is:  As needed Dose:  10 mg Take 1 Tab by mouth three (3) times daily as needed for Muscle Spasm(s). Quantity:  15 Tab Refills:  0  
     
   
   
   
  
 dapagliflozin 5 mg Tab tablet Commonly known as:  [employer] Your next dose is:  8/10 Dose:  5 mg Take 1 Tab by mouth daily. Quantity:  90 Tab Refills:  1  
     
   
   
   
  
 diclofenac EC 75 mg EC tablet Commonly known as:  VOLTAREN Your next dose is: Today with supper Dose:  75 mg Take 1 Tab by mouth two (2) times a day. Quantity:  180 Tab Refills:  1  
     
   
   
  
   
  
 fluticasone-salmeterol 250-50 mcg/dose diskus inhaler Commonly known as:  ADVAIR DISKUS Your next dose is: Tonight at bedtime Dose:  1 Puff Take 1 Puff by inhalation every twelve (12) hours. Quantity:  1 Inhaler Refills:  12  
     
   
   
   
  
  
 levalbuterol 1.25 mg/3 mL Nebu Commonly known as:  Nessa Maclachlan Your next dose is:  As needed Dose:  1.25 mg  
3 mL by Nebulization route every four (4) hours as needed. Quantity:  30 Nebule Refills:  11  
     
   
   
   
  
 losartan-hydroCHLOROthiazide 100-25 mg per tablet Commonly known as:  HYZAAR Your next dose is:  8/10 Dose:  1 Tab Take 1 Tab by mouth daily. Quantity:  90 Tab Refills:  1  
     
   
   
   
  
 meclizine 25 mg tablet Commonly known as:  ANTIVERT Your next dose is:  As needed TK 1 T PO TID FOR UP TO 10 DAYS PRN Refills:  2  
     
   
   
   
  
 omega-3 acid ethyl esters 1 gram capsule Commonly known as:  Gweneth Salt Your next dose is: This evening Dose:  2 g Take 2 Caps by mouth two (2) times a day. Quantity:  120 Cap Refills:  5  
     
   
   
  
   
  
 ondansetron 4 mg disintegrating tablet Commonly known as:  ZOFRAN ODT Your next dose is:  As needed Dose:  4 mg Take 1 tablet by mouth every eight (8) hours as needed. Quantity:  20 tablet Refills:  1  
     
   
   
   
  
 potassium chloride SA 10 mEq capsule Commonly known as:  Sylvia Paul Your next dose is: This evening Dose:  10 mEq Take 1 Cap by mouth two (2) times a day. Quantity:  180 Cap Refills:  1  
     
   
   
  
   
  
 promethazine 25 mg tablet Commonly known as:  PHENERGAN Your next dose is:  As needed Dose:  25 mg Take 1 tablet by mouth every six (6) hours as needed. Quantity:  12 tablet Refills:  0  
     
   
   
   
  
 traMADol 50 mg tablet Commonly known as:  ULTRAM  
Your next dose is:  As needed Dose:   mg Take 1-2 Tabs by mouth every six (6) hours as needed for Pain. Max Daily Amount: 400 mg. Quantity:  24 Tab Refills:  0 Discharge Instructions DISCHARGE SUMMARY from Nurse The following personal items are in your possession at time of discharge: 
 
Dental Appliances: None Visual Aid: At bedside Jewelry: Watch Other Valuables: Cell Phone PATIENT INSTRUCTIONS: 
 
 
F-face looks uneven A-arms unable to move or move unevenly S-speech slurred or non-existent T-time-call 911 as soon as signs and symptoms begin-DO NOT go Back to bed or wait to see if you get better-TIME IS BRAIN. Warning Signs of HEART ATTACK Call 911 if you have these symptoms: 
? Chest discomfort.  Most heart attacks involve discomfort in the center of the chest that lasts more than a few minutes, or that goes away and comes back. It can feel like uncomfortable pressure, squeezing, fullness, or pain. ? Discomfort in other areas of the upper body. Symptoms can include pain or discomfort in one or both arms, the back, neck, jaw, or stomach. ? Shortness of breath with or without chest discomfort. ? Other signs may include breaking out in a cold sweat, nausea, or lightheadedness. Don't wait more than five minutes to call [address]! Fast action can save your life. Calling 911 is almost always the fastest way to get lifesaving treatment. Emergency Medical Services staff can begin treatment when they arrive  up to an hour sooner than if someone gets to the hospital by car. The discharge information has been reviewed with the patient. The patient verbalized understanding. Discharge medications reviewed with the patient and appropriate educational materials and side effects teaching were provided. Diarrhea: Care Instructions Your Care Instructions Diarrhea is loose, watery stools (bowel movements). The exact cause is often hard to find. Sometimes diarrhea is your body's way of getting rid of what caused an upset stomach. Viruses, food poisoning, and many medicines can cause diarrhea. Some people get diarrhea in response to emotional stress, anxiety, or certain foods. Almost everyone has diarrhea now and then. It usually isn't serious, and your stools will return to normal soon. The important thing to do is replace the fluids you have lost, so you can prevent dehydration. The doctor has checked you carefully, but problems can develop later. If you notice any problems or new symptoms, get medical treatment right away. Follow-up care is a key part of your treatment and safety. Be sure to make and go to all appointments, and call your doctor if you are having problems. It's also a good idea to know your test results and keep a list of the medicines you take. How can you care for yourself at home? · Watch for signs of dehydration, which means your body has lost too much water. Dehydration is a serious condition and should be treated right away. Signs of dehydration are: 
¨ Increasing thirst and dry eyes and mouth. ¨ Feeling faint or lightheaded. ¨ Darker urine, and a smaller amount of urine than normal. 
· To prevent dehydration, drink plenty of fluids, enough so that your urine is light yellow or clear like water. Choose water and other caffeine-free clear liquids until you feel better. If you have kidney, heart, or liver disease and have to limit fluids, talk with your doctor before you increase the amount of fluids you drink. · Begin eating small amounts of mild foods the next day, if you feel like it. ¨ Try yogurt that has live cultures of Lactobacillus. (Check the label.) ¨ Avoid spicy foods, fruits, alcohol, and caffeine until 48 hours after all symptoms are gone. ¨ Avoid chewing gum that contains sorbitol. ¨ Avoid dairy products (except for yogurt with Lactobacillus) while you have diarrhea and for 3 days after symptoms are gone. · The doctor may recommend that you take over-the-counter medicine, such as loperamide (Imodium), if you still have diarrhea after 6 hours. Read and follow all instructions on the label. Do not use this medicine if you have bloody diarrhea, a high fever, or other signs of serious illness. Call your doctor if you think you are having a problem with your medicine. When should you call for help? Call 911 anytime you think you may need emergency care. For example, call if: 
· You passed out (lost consciousness). · Your stools are maroon or very bloody. Call your doctor now or seek immediate medical care if: 
· You are dizzy or lightheaded, or you feel like you may faint. · Your stools are black and look like tar, or they have streaks of blood. · You have new or worse belly pain. · You have symptoms of dehydration, such as: ¨ Dry eyes and a dry mouth. ¨ Passing only a little dark urine. ¨ Feeling thirstier than usual. 
· You have a new or higher fever. Watch closely for changes in your health, and be sure to contact your doctor if: 
· Your diarrhea is getting worse. · You see pus in the diarrhea. · You are not getting better after 2 days (48 hours). Where can you learn more? Go to http://lyndon-mary ellen.info/. Enter X296 in the search box to learn more about \"Diarrhea: Care Instructions. \" Current as of: March 20, 2017 Content Version: 11.3 © 4571-3640 [employer]. Care instructions adapted under license by [employer] (which disclaims liability or warranty for this information). If you have questions about a medical condition or this instruction, always ask your healthcare professional. [address] any warranty or liability for your use of this information. Discharge Orders None tydy Announcement We are excited to announce that we are making your provider's discharge notes available to you in tydy. You will see these notes when they are completed and signed by the physician that discharged you from your recent hospital stay. If you have any questions or concerns about any information you see in tydy, please call the Health Information Department where you were seen or reach out to your Primary Care Provider for more information about your plan of care. Introducing [facility] HE[facility]! Dear Lindsey Deras: Thank you for requesting a tydy account. Our records indicate that you already have an active tydy account. You can access your account anytime at [email or web address] Did you know that you can access your hospital and ER discharge instructions at any time in tydy? You can also review all of your test results from your hospital stay or ER visit. Additional Information If you have questions, please visit the Frequently Asked Questions section of the MyChart website at [email or web address] Remember, MyChart is NOT to be used for urgent needs. For medical emergencies, dial 911. Now available from your iPhone and Android! General Information Please provide this summary of care documentation to your next provider. Patient Signature:  ____________________________________________________________ Date:  ____________________________________________________________  
  
[facility] Provider Signature:  ____________________________________________________________ Date:  ____________________________________________________________

## 2017-08-08 PROBLEM — I95.9 HYPOTENSION: Status: ACTIVE | Noted: 2017-08-08

## 2017-08-08 PROBLEM — A08.4 VIRAL GASTROENTERITIS: Status: ACTIVE | Noted: 2017-08-08

## 2017-08-08 PROBLEM — R65.10 SIRS (SYSTEMIC INFLAMMATORY RESPONSE SYNDROME) (HCC): Status: ACTIVE | Noted: 2017-08-08

## 2017-08-08 PROBLEM — E86.9 VOLUME DEPLETION: Status: ACTIVE | Noted: 2017-08-08

## 2017-08-08 PROBLEM — N17.9 ACUTE RENAL FAILURE (ARF) (HCC): Status: ACTIVE | Noted: 2017-08-08

## 2017-08-08 LAB
ATRIAL RATE: 90 BPM
CALCULATED P AXIS, ECG09: 81 DEGREES
CALCULATED R AXIS, ECG10: 55 DEGREES
CALCULATED T AXIS, ECG11: 81 DEGREES
DIAGNOSIS, 93000: NORMAL
GLUCOSE BLD STRIP.AUTO-MCNC: 108 MG/DL (ref 65–100)
GLUCOSE BLD STRIP.AUTO-MCNC: 114 MG/DL (ref 65–100)
GLUCOSE BLD STRIP.AUTO-MCNC: 118 MG/DL (ref 65–100)
GLUCOSE BLD STRIP.AUTO-MCNC: 127 MG/DL (ref 65–100)
GLUCOSE BLD STRIP.AUTO-MCNC: 177 MG/DL (ref 65–100)
LACTATE BLD-SCNC: 1.4 MMOL/L (ref 0.5–1.9)
LACTATE BLD-SCNC: 3.1 MMOL/L (ref 0.5–1.9)
P-R INTERVAL, ECG05: 150 MS
Q-T INTERVAL, ECG07: 414 MS
QRS DURATION, ECG06: 86 MS
QTC CALCULATION (BEZET), ECG08: 506 MS
VENTRICULAR RATE, ECG03: 90 BPM

## 2017-08-08 PROCEDURE — 74011250636 HC RX REV CODE- 250/636: Performed by: EMERGENCY MEDICINE

## 2017-08-08 PROCEDURE — 94760 N-INVAS EAR/PLS OXIMETRY 1: CPT

## 2017-08-08 PROCEDURE — 65660000000 HC RM CCU STEPDOWN

## 2017-08-08 PROCEDURE — 97162 PT EVAL MOD COMPLEX 30 MIN: CPT

## 2017-08-08 PROCEDURE — 74011250637 HC RX REV CODE- 250/637: Performed by: EMERGENCY MEDICINE

## 2017-08-08 PROCEDURE — 74011250636 HC RX REV CODE- 250/636: Performed by: HOSPITALIST

## 2017-08-08 PROCEDURE — 74011000250 HC RX REV CODE- 250: Performed by: HOSPITALIST

## 2017-08-08 PROCEDURE — 94664 DEMO&/EVAL PT USE INHALER: CPT

## 2017-08-08 PROCEDURE — 74011636637 HC RX REV CODE- 636/637: Performed by: HOSPITALIST

## 2017-08-08 PROCEDURE — 93306 TTE W/DOPPLER COMPLETE: CPT

## 2017-08-08 PROCEDURE — 82962 GLUCOSE BLOOD TEST: CPT

## 2017-08-08 PROCEDURE — 94640 AIRWAY INHALATION TREATMENT: CPT

## 2017-08-08 PROCEDURE — 74011000250 HC RX REV CODE- 250: Performed by: EMERGENCY MEDICINE

## 2017-08-08 PROCEDURE — 83605 ASSAY OF LACTIC ACID: CPT

## 2017-08-08 PROCEDURE — 74011250637 HC RX REV CODE- 250/637: Performed by: HOSPITALIST

## 2017-08-08 RX ORDER — ENOXAPARIN SODIUM 100 MG/ML
40 INJECTION SUBCUTANEOUS EVERY 24 HOURS
Status: DISCONTINUED | OUTPATIENT
Start: 2017-08-08 | End: 2017-08-09 | Stop reason: HOSPADM

## 2017-08-08 RX ORDER — INSULIN LISPRO 100 [IU]/ML
4 INJECTION, SOLUTION INTRAVENOUS; SUBCUTANEOUS
Status: DISCONTINUED | OUTPATIENT
Start: 2017-08-08 | End: 2017-08-09 | Stop reason: HOSPADM

## 2017-08-08 RX ORDER — CARISOPRODOL 350 MG/1
350 TABLET ORAL 4 TIMES DAILY
Status: DISCONTINUED | OUTPATIENT
Start: 2017-08-08 | End: 2017-08-09 | Stop reason: HOSPADM

## 2017-08-08 RX ORDER — TRAMADOL HYDROCHLORIDE 50 MG/1
50-100 TABLET ORAL
Status: DISCONTINUED | OUTPATIENT
Start: 2017-08-08 | End: 2017-08-08 | Stop reason: DRUGHIGH

## 2017-08-08 RX ORDER — ALBUTEROL SULFATE 2.5 MG/.5ML
2.5 SOLUTION RESPIRATORY (INHALATION)
Status: DISCONTINUED | OUTPATIENT
Start: 2017-08-08 | End: 2017-08-09 | Stop reason: HOSPADM

## 2017-08-08 RX ORDER — ACETAMINOPHEN 325 MG/1
650 TABLET ORAL
Status: DISCONTINUED | OUTPATIENT
Start: 2017-08-08 | End: 2017-08-09 | Stop reason: HOSPADM

## 2017-08-08 RX ORDER — CYCLOBENZAPRINE HCL 10 MG
10 TABLET ORAL
Status: DISCONTINUED | OUTPATIENT
Start: 2017-08-08 | End: 2017-08-09 | Stop reason: HOSPADM

## 2017-08-08 RX ORDER — SODIUM CHLORIDE 9 MG/ML
150 INJECTION, SOLUTION INTRAVENOUS CONTINUOUS
Status: DISCONTINUED | OUTPATIENT
Start: 2017-08-08 | End: 2017-08-09 | Stop reason: HOSPADM

## 2017-08-08 RX ORDER — SODIUM CHLORIDE 0.9 % (FLUSH) 0.9 %
5-10 SYRINGE (ML) INJECTION AS NEEDED
Status: DISCONTINUED | OUTPATIENT
Start: 2017-08-08 | End: 2017-08-09 | Stop reason: HOSPADM

## 2017-08-08 RX ORDER — POTASSIUM CHLORIDE 20 MEQ/1
40 TABLET, EXTENDED RELEASE ORAL DAILY
Status: DISCONTINUED | OUTPATIENT
Start: 2017-08-08 | End: 2017-08-09 | Stop reason: HOSPADM

## 2017-08-08 RX ORDER — INSULIN GLARGINE 100 [IU]/ML
14 INJECTION, SOLUTION SUBCUTANEOUS
Status: DISCONTINUED | OUTPATIENT
Start: 2017-08-08 | End: 2017-08-09 | Stop reason: HOSPADM

## 2017-08-08 RX ORDER — ERGOCALCIFEROL 1.25 MG/1
50000 CAPSULE ORAL 2 TIMES WEEKLY
Status: DISCONTINUED | OUTPATIENT
Start: 2017-08-11 | End: 2017-08-09 | Stop reason: HOSPADM

## 2017-08-08 RX ORDER — TRAMADOL HYDROCHLORIDE 50 MG/1
50-100 TABLET ORAL
Status: DISCONTINUED | OUTPATIENT
Start: 2017-08-08 | End: 2017-08-09 | Stop reason: HOSPADM

## 2017-08-08 RX ORDER — POTASSIUM CHLORIDE 14.9 MG/ML
20 INJECTION INTRAVENOUS
Status: COMPLETED | OUTPATIENT
Start: 2017-08-08 | End: 2017-08-08

## 2017-08-08 RX ORDER — ONDANSETRON 2 MG/ML
4 INJECTION INTRAMUSCULAR; INTRAVENOUS
Status: DISCONTINUED | OUTPATIENT
Start: 2017-08-08 | End: 2017-08-09 | Stop reason: HOSPADM

## 2017-08-08 RX ORDER — SODIUM CHLORIDE 0.9 % (FLUSH) 0.9 %
5-10 SYRINGE (ML) INJECTION EVERY 8 HOURS
Status: DISCONTINUED | OUTPATIENT
Start: 2017-08-08 | End: 2017-08-09 | Stop reason: HOSPADM

## 2017-08-08 RX ORDER — BUDESONIDE 0.5 MG/2ML
500 INHALANT ORAL
Status: DISCONTINUED | OUTPATIENT
Start: 2017-08-08 | End: 2017-08-09 | Stop reason: HOSPADM

## 2017-08-08 RX ORDER — INSULIN LISPRO 100 [IU]/ML
INJECTION, SOLUTION INTRAVENOUS; SUBCUTANEOUS
Status: DISCONTINUED | OUTPATIENT
Start: 2017-08-08 | End: 2017-08-09 | Stop reason: HOSPADM

## 2017-08-08 RX ORDER — LEVALBUTEROL INHALATION SOLUTION 1.25 MG/3ML
1.25 SOLUTION RESPIRATORY (INHALATION)
Status: DISCONTINUED | OUTPATIENT
Start: 2017-08-08 | End: 2017-08-09 | Stop reason: HOSPADM

## 2017-08-08 RX ORDER — ATORVASTATIN CALCIUM 10 MG/1
20 TABLET, FILM COATED ORAL DAILY
Status: DISCONTINUED | OUTPATIENT
Start: 2017-08-08 | End: 2017-08-09 | Stop reason: HOSPADM

## 2017-08-08 RX ADMIN — INSULIN LISPRO 4 UNITS: 100 INJECTION, SOLUTION INTRAVENOUS; SUBCUTANEOUS at 16:30

## 2017-08-08 RX ADMIN — ENOXAPARIN SODIUM 40 MG: 40 INJECTION SUBCUTANEOUS at 01:42

## 2017-08-08 RX ADMIN — METRONIDAZOLE 500 MG: 500 INJECTION, SOLUTION INTRAVENOUS at 13:03

## 2017-08-08 RX ADMIN — CARISOPRODOL 350 MG: 350 TABLET ORAL at 16:36

## 2017-08-08 RX ADMIN — Medication 10 ML: at 13:05

## 2017-08-08 RX ADMIN — CYCLOBENZAPRINE HYDROCHLORIDE 10 MG: 10 TABLET, FILM COATED ORAL at 07:49

## 2017-08-08 RX ADMIN — ENOXAPARIN SODIUM 40 MG: 40 INJECTION SUBCUTANEOUS at 23:12

## 2017-08-08 RX ADMIN — POTASSIUM CHLORIDE 40 MEQ: 1.5 SOLUTION ORAL at 00:33

## 2017-08-08 RX ADMIN — METRONIDAZOLE 500 MG: 500 INJECTION, SOLUTION INTRAVENOUS at 06:00

## 2017-08-08 RX ADMIN — CARISOPRODOL 350 MG: 350 TABLET ORAL at 23:12

## 2017-08-08 RX ADMIN — POTASSIUM CHLORIDE 20 MEQ: 14.9 INJECTION, SOLUTION INTRAVENOUS at 05:23

## 2017-08-08 RX ADMIN — SODIUM CHLORIDE 150 ML/HR: 900 INJECTION, SOLUTION INTRAVENOUS at 13:04

## 2017-08-08 RX ADMIN — INSULIN LISPRO 4 UNITS: 100 INJECTION, SOLUTION INTRAVENOUS; SUBCUTANEOUS at 13:02

## 2017-08-08 RX ADMIN — ALBUTEROL SULFATE 2.5 MG: 2.5 SOLUTION RESPIRATORY (INHALATION) at 19:58

## 2017-08-08 RX ADMIN — INSULIN GLARGINE 14 UNITS: 100 INJECTION, SOLUTION SUBCUTANEOUS at 01:44

## 2017-08-08 RX ADMIN — INSULIN LISPRO 4 UNITS: 100 INJECTION, SOLUTION INTRAVENOUS; SUBCUTANEOUS at 08:39

## 2017-08-08 RX ADMIN — METRONIDAZOLE 500 MG: 500 INJECTION, SOLUTION INTRAVENOUS at 23:12

## 2017-08-08 RX ADMIN — POTASSIUM CHLORIDE 40 MEQ: 20 TABLET, EXTENDED RELEASE ORAL at 09:19

## 2017-08-08 RX ADMIN — POTASSIUM CHLORIDE 20 MEQ: 14.9 INJECTION, SOLUTION INTRAVENOUS at 01:41

## 2017-08-08 RX ADMIN — SODIUM CHLORIDE 1000 ML: 900 INJECTION, SOLUTION INTRAVENOUS at 00:03

## 2017-08-08 RX ADMIN — ATORVASTATIN CALCIUM 20 MG: 10 TABLET, FILM COATED ORAL at 09:19

## 2017-08-08 RX ADMIN — CARISOPRODOL 350 MG: 350 TABLET ORAL at 09:18

## 2017-08-08 RX ADMIN — INSULIN GLARGINE 14 UNITS: 100 INJECTION, SOLUTION SUBCUTANEOUS at 23:12

## 2017-08-08 RX ADMIN — SODIUM CHLORIDE 150 ML/HR: 900 INJECTION, SOLUTION INTRAVENOUS at 01:41

## 2017-08-08 RX ADMIN — TRAMADOL HYDROCHLORIDE 50 MG: 50 TABLET, FILM COATED ORAL at 07:49

## 2017-08-08 RX ADMIN — BUDESONIDE 500 MCG: 0.5 INHALANT RESPIRATORY (INHALATION) at 19:58

## 2017-08-08 RX ADMIN — Medication 10 ML: at 23:14

## 2017-08-08 NOTE — PROGRESS NOTES
Problem: Mobility Impaired (Adult and Pediatric)  Goal: *Acute Goals and Plan of Care (Insert Text)  LTG:  (1.)Ms. Alek Hollingsworth will move from supine to sit and sit to supine, scoot up and down and roll side to side in bed with INDEPENDENT within 7 day(s). (2.)Ms. Alek Hollingsworth will transfer from bed to chair and chair to bed with INDEPENDENT using no device within 7 day(s). (3.)Ms. Alek Hollingsworth will ambulate with INDEPENDENCE for 500+ feet with the least restrictive/no device within 7 day(s). (4.)Ms. Alek Hollingsworth will perform standing static and dynamic balance activities x 8 minutes with SUPERVISION to improve safety within 7 day(s). (5.)Ms. Alek Hollingsworth will ascend and descend 4 stairs using one hand rail(s) with SUPERVISION to improve functional mobility and safety within 7 day(s). PHYSICAL THERAPY: INITIAL ASSESSMENT, PM 8/8/2017  INPATIENT: Hospital Day: 2  Payor: Dariela Franco / Plan: Francesca Morris / Product Type: PPO /      NAME/AGE/GENDER: Jony Mansfield is a 60 y.o. female            PRIMARY DIAGNOSIS: Viral gastroenteritis  Hypotension  Volume depletion  SIRS (systemic inflammatory response syndrome) ([facility] [address]  Acute renal failure (ARF) (HCC) <principal problem not specified> <principal problem not specified>        ICD-10: Treatment Diagnosis:       · Other abnormalities of gait and mobility (R26.89)  · History of falling (Z91.81)  · Dizziness and Giddiness (R42)   Precaution/Allergies:  Codeine and Meperidine       ASSESSMENT:      Ms. Alek Hollingsworth presents asleep in bed, awakened easily and agreeable for PT assessment. Patient transitioned to sit with supervision, complained of dizziness. /79, HR tachy initially then decreased to 93, SpO2 88% initially on RA then increased to 96%. Patient stood with supervision, took BP it was basically unchanged. Patient ambulated 125' in hallway with slow jerome and decreased step length holding onto IV pole and siderail in hallway.   Patient unsteady with gait, normally ambulates without assistive device. Patient has declined in functional mobility, Ms. Seabron Boast would benefit from skilled physical therapy (medically necessary) to address her deficits and maximize her function. This section established at most recent assessment   PROBLEM LIST (Impairments causing functional limitations):  1. Decreased ADL/Functional Activities  2. Decreased Transfer Abilities  3. Decreased Ambulation Ability/Technique  4. Decreased Balance  5. Increased Pain  6. Decreased Activity Tolerance    INTERVENTIONS PLANNED: (Benefits and precautions of physical therapy have been discussed with the patient.)  1. Balance Exercise  2. Bed Mobility  3. Gait Training  4. Therapeutic Activites  5. Therapeutic Exercise/Strengthening  6. Transfer Training  7. education  8. Group Therapy      TREATMENT PLAN: Frequency/Duration: 3 times a week for 1 week  Rehabilitation Potential For Stated Goals: EXCELLENT      RECOMMENDED REHABILITATION/EQUIPMENT: (at time of discharge pending progress): Outpatient: Physical Therapy due to LBP from recent fall. HISTORY:   History of Present Injury/Illness (Reason for Referral):  Per MD note, \"Patient is 60years old female with pmhx of DM-2, HTN, dyslipidemia, anxiety, depression, ? Svt, who presented with one day history of watery diarrhea and syncopal episode. Pt was in her usual health until yesterday when she started having sudden onset of watery diarrhea, had about 5-7 episodes, no mucus or blood seen. It was associated with some abdominal discomfort, which has resolved, denies any nausea or vomiting. She denies any fever, chills, night sweats. Pt also reports of passing out for unknown duration of time, and following the episode feels fatigue and dizzy/lightheaded, has chronic back pain. Denies chest pain, SOB, urinary symptoms, headache, blurry vision, numbness/tingling in extremities, head trauma, seizure like activity.   In ER, pt was hypotensive with SBP ~ 80-90, that responded to 3 ltrs of fluid bolus. Also, had hypokalemia of 2.7 and creatinine of 1.41 suggestive of acute renal failure. Lactic acid 3.2 --> 3.0. Hospitalist team asked to admit in view of above symptoms and findings. \"  Past Medical History/Comorbidities:   Ms. Daisy Hilton  has a past medical history of Anxiety; Arrhythmia; Asthma (2010); Depression; Diabetes mellitus type 2, controlled, without complications ([facility] [address] Dyslipidemia (2010); Hypertension; Migraines; SVT (supraventricular tachycardia) ([facility] [address] and Vitamin D deficiency. Ms. Daisy Hilton  has a past surgical history that includes  section (); abdomen surgery proc unlisted; tonsillectomy (as child); tubal ligation (Bilateral); kash and bso (); and lap cholecystectomy (). Social History/Living Environment:   Home Environment: Trailer/mobile home  # Steps to Enter: 5  Rails to Enter: Yes  One/Two Story Residence: One story  Living Alone: No  Support Systems: Child(laura)  Patient Expects to be Discharged to[de-identified] Private residence  Prior Level of Function/Work/Activity:  Lives at home with adult son.   Independent in home and community, works, drives, etc.      Number of Personal Factors/Comorbidities that affect the Plan of Care: 1-2: MODERATE COMPLEXITY   EXAMINATION:   Most Recent Physical Functioning:   Gross Assessment:  AROM: Generally decreased, functional  Strength: Generally decreased, functional               Posture:     Balance:  Sitting: Intact  Standing: Impaired  Standing - Static: Fair  Standing - Dynamic : Fair Bed Mobility:  Rolling: Supervision  Supine to Sit: Supervision  Wheelchair Mobility:     Transfers:  Sit to Stand: Supervision  Stand to Sit: Supervision  Gait:     Base of Support: Widened  Speed/Meghann: Slow  Step Length: Left shortened;Right shortened  Distance (ft): 125 Feet (ft)  Assistive Device:  (pushing IV pole and holding siderail in hallway)  Ambulation - Level of Assistance: Contact guard assistance  Interventions: Other (comment); Safety awareness training;Verbal cues       Body Structures Involved:  1. Muscles  2. Ligaments Body Functions Affected:  1. Neuromusculoskeletal  2. Movement Related Activities and Participation Affected:  1. Mobility  2. Self Care  3. Domestic Life   Number of elements that affect the Plan of Care: 4+: HIGH COMPLEXITY   CLINICAL PRESENTATION:   Presentation: Evolving clinical presentation with changing clinical characteristics: MODERATE COMPLEXITY   CLINICAL DECISION MAKIN [facility] Mobility Inpatient Short Form  How much difficulty does the patient currently have. .. Unable A Lot A Little None   1. Turning over in bed (including adjusting bedclothes, sheets and blankets)? [ ] 1   [ ] 2   [ ] 3   [X] 4   2. Sitting down on and standing up from a chair with arms ( e.g., wheelchair, bedside commode, etc.)   [ ] 1   [ ] 2   [ ] 3   [X] 4   3. Moving from lying on back to sitting on the side of the bed? [ ] 1   [ ] 2   [ ] 3   [X] 4   How much help from another person does the patient currently need. .. Total A Lot A Little None   4. Moving to and from a bed to a chair (including a wheelchair)? [ ] 1   [ ] 2   [X] 3   [ ] 4   5. Need to walk in hospital room? [ ] 1   [ ] 2   [X] 3   [ ] 4   6. Climbing 3-5 steps with a railing? [ ] 1   [ ] 2   [X] 3   [ ] 4   © , Trustees of [address] [place] [address], under license to [employer]. All rights reserved    Score:  Initial: 21 Most Recent: X (Date: -- )     Interpretation of Tool:  Represents activities that are increasingly more difficult (i.e. Bed mobility, Transfers, Gait).        Score 24 23 22-20 19-15 14-10 9-7 6       Modifier CH CI CJ CK CL CM CN         · Mobility - Walking and Moving Around:               - CURRENT STATUS:    CJ - 20%-39% impaired, limited or restricted               - GOAL STATUS:           CI - 1%-19% impaired, limited or restricted               - D/C STATUS:                       ---------------To be determined---------------  Payor: Ade Solares / Plan: Kapil Dyson / Product Type: PPO /       Medical Necessity:     · Patient is expected to demonstrate progress in strength, range of motion, balance, functional technique and activity tolerance to increase independence with   and improve safety during all functional mobility. Reason for Services/Other Comments:  · Patient continues to require skilled intervention due to medical complications and patient unable to attend/participate in therapy as expected. Use of outcome tool(s) and clinical judgement create a POC that gives a: Questionable prediction of patient's progress: MODERATE COMPLEXITY                 TREATMENT:   (In addition to Assessment/Re-Assessment sessions the following treatments were rendered)   Pre-treatment Symptoms/Complaints:  Dizziness with upright. Pain: Initial:   Pain Intensity 1: 7  Pain Location 1: Back  Pain Orientation 1: Lower  Pain Intervention(s) 1: Ambulation/Increased Activity, Repositioned  Post Session:  Unchanged. Assessment/Reassessment only, no treatment provided today     Braces/Orthotics/Lines/Etc:   · IV  · O2 Device: Room air  Treatment/Session Assessment:    · Response to Treatment:  Fatigued. · Interdisciplinary Collaboration:  · Physical Therapist  · Registered Nurse  · After treatment position/precautions:  · Up in chair  · Bed/Chair-wheels locked  · Call light within reach  · RN notified  · Nurse at bedside  · Compliance with Program/Exercises: Will assess as treatment progresses. · Recommendations/Intent for next treatment session: \"Next visit will focus on advancements to more challenging activities and reduction in assistance provided\".   Total Treatment Duration:  PT Patient Time In/Time Out  Time In: 1520  Time Out: [address], PT

## 2017-08-08 NOTE — PROGRESS NOTES
Nutrition:   Acknowledge Nutrition Consult for Electrolyte Replacement Management. (Dr. Angelita Valdovinos)   The Malnutrition Screen Tool remains incomplete at this time. Labs are remarkable for potassium 2.7. Nutrition Support Orders for Electrolyte Replacement Management are now activated and on the MAR. Oral potassium supplementation has been ordered. Follow-up based on standards of care. John Rosales.  Shama Howe   [phone]

## 2017-08-08 NOTE — ED NOTES
TRANSFER - OUT REPORT:    Verbal report given to Ian Giron RN on Bessie Gilliland  being transferred to 6th floor for routine progression of care       Report consisted of patients Situation, Background, Assessment and   Recommendations(SBAR). Information from the following report(s) SBAR, ED Summary and MAR was reviewed with the receiving nurse. Lines:   Peripheral IV 08/07/17 Left Antecubital (Active)       Peripheral IV 08/07/17 Right Antecubital (Active)        Opportunity for questions and clarification was provided.

## 2017-08-08 NOTE — ED TRIAGE NOTES
Pt arrives via EMS for diarrhea and syncopal event at home. States did not hit head. Unsure of how long she was out. States 6 episodes of diarrhea today. EMS states initial BP 80/40. Denies chest pain, sob. Pt received 400 ml NS in route.

## 2017-08-08 NOTE — PROGRESS NOTES
No stool noted. Sat up in chair. Tolerating diet with no pain or nausea reported. Hourly rounds this shift.

## 2017-08-08 NOTE — ED NOTES
Pt in bed resting quietly. No signs or symptoms of distress noted. Curtain is closed for pt privacy reasons. Pt states she does not want any coworkers in room, only son.

## 2017-08-08 NOTE — PROGRESS NOTES
TRANSFER - IN REPORT:    Verbal report received from Gauri RN(name) on Calvin Grooms  being received from ED(unit) for routine progression of care      Report consisted of patients Situation, Background, Assessment and   Recommendations(SBAR). Information from the following report(s) SBAR, ED Summary, [facility] and Recent Results was reviewed with the receiving nurse. Opportunity for questions and clarification was provided. Assessment completed upon patients arrival to unit and care assumed.

## 2017-08-08 NOTE — H&P
HOSPITALIST H&P/CONSULT  NAME:  Fransico Downs   Age:  60 y.o.  :   1960   MRN:   [id number]  PCP: Beto Vázquez., MD  Consulting MD:  Treatment Team: Attending Provider: Jonathon Layne MD; Primary Nurse: Maribel Armstrong RN  HPI:   Patient is 60years old female with pmhx of DM-2, HTN, dyslipidemia, anxiety, depression, ? Svt, who presented with one day history of watery diarrhea and syncopal episode. Pt was in her usual health until yesterday when she started having sudden onset of watery diarrhea, had about 5-7 episodes, no mucus or blood seen. It was associated with some abdominal discomfort, which has resolved, denies any nausea or vomiting. She denies any fever, chills, night sweats. Pt also reports of passing out for unknown duration of time, and following the episode feels fatigue and dizzy/lightheaded, has chronic back pain. Denies chest pain, SOB, urinary symptoms, headache, blurry vision, numbness/tingling in extremities, head trauma, seizure like activity. In ER, pt was hypotensive with SBP ~ 80-90, that responded to 3 ltrs of fluid bolus. Also, had hypokalemia of 2.7 and creatinine of 1.41 suggestive of acute renal failure. Lactic acid 3.2 --> 3.0. Hospitalist team asked to admit in view of above symptoms and findings.     Complete ROS done and is as stated in HPI or otherwise negative  Past Medical History:   Diagnosis Date    Anxiety     Arrhythmia     hx svt    Asthma 2010    Depression     Diabetes mellitus type 2, controlled, without complications ([address]     Dyslipidemia 2010    Hypertension     Migraines     SVT (supraventricular tachycardia) ([address]     Vitamin D deficiency       Past Surgical History:   Procedure Laterality Date    ABDOMEN SURGERY PROC UNLISTED      HX  SECTION      HX LAP CHOLECYSTECTOMY      HX MICHI AND BSO  2001    Hysterectomy    HX TONSILLECTOMY  as child    HX TUBAL LIGATION Bilateral           Prior to Admission Medications   Prescriptions Last Dose Informant Patient Reported? Taking? Desvenlafaxine SR (PRISTIQ) 50 mg tablet   No No   Sig: Take 1 Tab by mouth daily. albuterol (VENTOLIN HFA) 90 mcg/actuation inhaler   No No   Sig: Take 2 Puffs by inhalation every four (4) hours as needed for Wheezing. albuterol (VENTOLIN) 90 mcg/Actuation inhaler   No No   Sig: Take 2 Puffs by inhalation every six (6) hours as needed. atorvastatin (LIPITOR) 20 mg tablet   No No   Sig: Take 1 Tab by mouth daily. carisoprodol (SOMA) 350 mg tablet   No No   Sig: Take 1 Tab by mouth four (4) times daily. Max Daily Amount: 1,400 mg.   cyclobenzaprine (FLEXERIL) 10 mg tablet   No No   Sig: Take 1 Tab by mouth three (3) times daily as needed for Muscle Spasm(s). dapagliflozin (FARXIGA) 5 mg tab tablet   No No   Sig: Take 1 Tab by mouth daily. diclofenac EC (VOLTAREN) 75 mg EC tablet   No No   Sig: Take 1 Tab by mouth two (2) times a day. ergocalciferol (DRISDOL) 50,000 unit capsule   No No   Sig: Take 1 Cap by mouth two (2) times a week. fluticasone-salmeterol (ADVAIR DISKUS) 250-50 mcg/dose diskus inhaler   No No   Sig: Take 1 Puff by inhalation every twelve (12) hours. levalbuterol (XOPENEX) 1.25 mg/3 mL nebu   No No   Sig: 3 mL by Nebulization route every four (4) hours as needed. losartan-hydroCHLOROthiazide (HYZAAR) 100-25 mg per tablet   No No   Sig: Take 1 Tab by mouth daily. meclizine (ANTIVERT) 25 mg tablet   Yes No   Sig: TK 1 T PO TID FOR UP TO 10 DAYS PRN   metFORMIN ER (GLUCOPHAGE XR) 500 mg tablet   Yes No   Sig: Take  by mouth daily (with dinner). omega-3 acid ethyl esters (LOVAZA) 1 gram capsule   No No   Sig: Take 2 Caps by mouth two (2) times a day. ondansetron (ZOFRAN ODT) 4 mg disintegrating tablet   No No   Sig: Take 1 tablet by mouth every eight (8) hours as needed. potassium chloride SA (MICRO-K) 10 mEq capsule   No No   Sig: Take 1 Cap by mouth two (2) times a day.    promethazine (PHENERGAN) 25 mg tablet   No No   Sig: Take 1 tablet by mouth every six (6) hours as needed. traMADol (ULTRAM) 50 mg tablet   No No   Sig: Take 1-2 Tabs by mouth every six (6) hours as needed for Pain. Max Daily Amount: 400 mg. Facility-Administered Medications: None     Allergies   Allergen Reactions    Codeine Itching    Meperidine Other (comments)     Hallucinations      Social History   Substance Use Topics    Smoking status: Never Smoker    Smokeless tobacco: Never Used    Alcohol use No      Family History   Problem Relation Age of Onset    Cancer Mother      colon    Elevated Lipids Mother     Cancer Father      colon    Diabetes Father      T2DM    Other Brother      sleep apnea      Objective:     Visit Vitals    /72    Pulse 96    Temp 97.6 °F (36.4 °C)    Resp 17    Ht 5' 3\" (1.6 m)    Wt 72.6 kg (160 lb)    SpO2 96%    BMI 28.34 kg/m2      Temp (24hrs), Av.6 °F (36.4 °C), Min:97.6 °F (36.4 °C), Max:97.6 °F (36.4 °C)    Oxygen Therapy  O2 Sat (%): 96 % (17)  Pulse via Oximetry: 92 beats per minute (17)  O2 Device: Room air (17)  Physical Exam:  General:    Alert, cooperative, no distress, appears stated age. HEENT:          Head AT/NC, PERRLA+, no pallor or ict, MMM, neck supple, no LAD. Lungs:   Clear to auscultation bilaterally. No Wheezing or Rhonchi. No rales. Heart:   Regular rate and rhythm,  no murmur, rub or gallop. Abdomen:   Soft, non tender. Not distended. Bowel sounds normal.   Extremities: No cyanosis. No edema. No clubbing. ROM WNL in all 4 ext  Skin:     Texture, turgor normal. No rashes or lesions. Not Jaundiced  Neurologic: GCS 15, no motor or sensory deficits, CN 2-12 intact, cerebellar functions intact. Psych:             AO x 3, mood and affect appropriate.    Data Review:   Recent Results (from the past 24 hour(s))   EKG, 12 LEAD, INITIAL    Collection Time: 17  8:29 PM   Result Value Ref Range Ventricular Rate 90 BPM    Atrial Rate 90 BPM    P-R Interval 150 ms    QRS Duration 86 ms    Q-T Interval 414 ms    QTC Calculation (Bezet) 506 ms    Calculated P Axis 81 degrees    Calculated R Axis 55 degrees    Calculated T Axis 81 degrees    Diagnosis       !! AGE AND GENDER SPECIFIC ECG ANALYSIS !! Normal sinus rhythm  Prolonged QT  Abnormal ECG  When compared with ECG of 24-APR-2013 10:36,  Nonspecific T wave abnormality no longer evident in Inferior leads  Nonspecific T wave abnormality, improved in Anterolateral leads  QT has lengthened     METABOLIC PANEL, COMPREHENSIVE    Collection Time: 08/07/17  8:45 PM   Result Value Ref Range    Sodium 140 136 - 145 mmol/L    Potassium 2.7 (LL) 3.5 - 5.1 mmol/L    Chloride 101 98 - 107 mmol/L    CO2 25 21 - 32 mmol/L    Anion gap 14 7 - 16 mmol/L    Glucose 209 (H) 65 - 100 mg/dL    BUN 22 6 - 23 MG/DL    Creatinine 1.41 (H) 0.6 - 1.0 MG/DL    GFR est AA 49 (L) >60 ml/min/1.73m2    GFR est non-AA 41 (L) >60 ml/min/1.73m2    Calcium 8.7 8.3 - 10.4 MG/DL    Bilirubin, total 0.8 0.2 - 1.1 MG/DL    ALT (SGPT) 22 12 - 65 U/L    AST (SGOT) 19 15 - 37 U/L    Alk. phosphatase 103 50 - 136 U/L    Protein, total 7.4 6.3 - 8.2 g/dL    Albumin 3.8 3.5 - 5.0 g/dL    Globulin 3.6 (H) 2.3 - 3.5 g/dL    A-G Ratio 1.1 (L) 1.2 - 3.5     CBC WITH AUTOMATED DIFF    Collection Time: 08/07/17  8:45 PM   Result Value Ref Range    WBC 14.8 (H) 4.3 - 11.1 K/uL    RBC 4.93 4.05 - 5.25 M/uL    HGB 13.9 11.7 - 15.4 g/dL    HCT 40.3 35.8 - 46.3 %    MCV 81.7 79.6 - 97.8 FL    MCH 28.2 26.1 - 32.9 PG    MCHC 34.5 31.4 - 35.0 g/dL    RDW 13.5 11.9 - 14.6 %    PLATELET 589 262 - 062 K/uL    MPV 10.2 (L) 10.8 - 14.1 FL    DF AUTOMATED      NEUTROPHILS 92 (H) 43 - 78 %    LYMPHOCYTES 5 (L) 13 - 44 %    MONOCYTES 3 (L) 4.0 - 12.0 %    EOSINOPHILS 0 (L) 0.5 - 7.8 %    BASOPHILS 0 0.0 - 2.0 %    IMMATURE GRANULOCYTES 0.2 0.0 - 5.0 %    ABS. NEUTROPHILS 13.4 (H) 1.7 - 8.2 K/UL    ABS.  LYMPHOCYTES 0.8 0.5 - 4.6 K/UL    ABS. MONOCYTES 0.5 0.1 - 1.3 K/UL    ABS. EOSINOPHILS 0.0 0.0 - 0.8 K/UL    ABS. BASOPHILS 0.0 0.0 - 0.2 K/UL    ABS. IMM. GRANS. 0.0 0.0 - 0.5 K/UL   LIPASE    Collection Time: 08/07/17  8:45 PM   Result Value Ref Range    Lipase 174 73 - 393 U/L   PROCALCITONIN    Collection Time: 08/07/17  8:45 PM   Result Value Ref Range    Procalcitonin 0.1 ng/mL   MAGNESIUM    Collection Time: 08/07/17  8:45 PM   Result Value Ref Range    Magnesium 2.1 1.8 - 2.4 mg/dL   POC TROPONIN-I    Collection Time: 08/07/17  8:45 PM   Result Value Ref Range    Troponin-I (POC) 0 0.0 - 0.08 ng/ml   POC LACTIC ACID    Collection Time: 08/07/17  8:50 PM   Result Value Ref Range    Lactic Acid (POC) 3.2 (H) 0.5 - 1.9 mmol/L   POC LACTIC ACID    Collection Time: 08/07/17 11:39 PM   Result Value Ref Range    Lactic Acid (POC) 3.0 (H) 0.5 - 1.9 mmol/L     Imaging /Procedures /Studies   CT abdomen and pelvis w contrast:  Impression:   1. No evidence of acute inflammatory process in the abdomen or pelvis. 2. Benign hepatic hemangiomata. Examination: Chest, portable AP view     History: Hypotension       Comparison: 4/5/2017     Findings: The cardiomediastinal silhouette is within normal limits in size.      There is no focal pulmonary infiltrate, sizable pleural effusion, or  pneumothorax.      IMPRESSION  Impression:    No evidence of acute cardiopulmonary abnormality.      Assessment and Plan: Active Hospital Problems    Diagnosis Date Noted    Hypotension 08/08/2017    Volume depletion 08/08/2017    Viral gastroenteritis 08/08/2017    Acute renal failure (ARF) (HCC) 08/08/2017    SIRS (systemic inflammatory response syndrome) ([facility] 08/08/2017       PLAN  ·  Admit to inpatient under remote tele in view of syncopal episode likely secondary to volume depletion due to viral gastroenteritis. · F/U with stool C.diff, WBC, culture and ova/parasite. · Continue IV flagyl q8h for now.   · F/u with blood and urine culture. · S/P 3 ltrs of IV fluid bolus, hemodynamically stable now. Continue IV fluids @ 150 cc/hr. · Lactic acidosis of 3.0, continue IV fluids, serial monitoring. · Hold antihypertensive meds for now. · Continue other home meds for anxiety/depression as reconciled in MAR. · DVT prophylaxis with lovenox and compression stockings. · POC qachs, with lantus 14 units, humalog with meals and SSI. Last HbA1c in 3/2017, 6.9. Hold farxiga. · TIMOTEO with creatinine of 1.41, continue IV fluids, likely from pre renal azotemia due to volume depletion. Avoid nephrotoxic agents and further hypotensive episodes. Daily BMP. · Continue home meds for chronic pain. · Hypokalemia of 2.7, likely from diarrhea, oral and IV replacement, recheck in AM.  · Leukocytosis is likely reactive and due to dehydration.     Code Status: full    Anticipated discharge: > 2 MN    Signed By: Savanna Sutton MD     August 8, 2017

## 2017-08-08 NOTE — ED NOTES
Pt in bed resting quietly. No signs or symptoms of distress noted. Pt was provided with a meal tray that she was able to eat some.

## 2017-08-08 NOTE — ED PROVIDER NOTES
HPI Comments: Presents with complaint multiple episodes of diarrhea and subsequent syncope. She was hypotensive per EMS upon arrival.  She denies any nausea or vomiting. She states she has abdominal pain associated with the diarrhea. No recent travel and no recent antibiotic use. She denies any chest pain or shortness of breath prior to her afterwards. She complains of back pain which is been somewhat chronic and son reports she's been falling a lot recently. Patient is a 60 y.o. female presenting with syncope and hypotension. The history is provided by the patient. Syncope    This is a new problem. The current episode started 1 to 2 hours ago. The problem occurs constantly. The problem has been resolved. Length of episode of loss of consciousness: unknown but likely minutes and <1 hour. The problem is associated with nothing. Associated symptoms include malaise/fatigue, abdominal pain, back pain (chronic) and light-headedness. Pertinent negatives include no chest pain, no confusion, no fever, no slurred speech, no melena, no anal bleeding and no head injury. She has tried nothing for the symptoms. Her past medical history is significant for syncope. Hypotension    Pertinent negatives include no confusion.         Past Medical History:   Diagnosis Date    Anxiety     Arrhythmia     hx svt    Asthma 2010    Depression     Diabetes mellitus type 2, controlled, without complications ([address]     Dyslipidemia 2010    Hypertension     Migraines     SVT (supraventricular tachycardia) ([address]     Vitamin D deficiency        Past Surgical History:   Procedure Laterality Date    ABDOMEN SURGERY PROC UNLISTED      HX  SECTION      HX LAP CHOLECYSTECTOMY  2013    HX MICHI AND BSO  2001    Hysterectomy    HX TONSILLECTOMY  as child    HX TUBAL LIGATION Bilateral              Family History:   Problem Relation Age of Onset    Cancer Mother      colon    Elevated Lipids Mother    [place]ul.[place] Cancer Father      colon    Diabetes Father      T2DM    Other Brother      sleep apnea       Social History     Social History    Marital status:      Spouse name: N/A    Number of children: N/A    Years of education: N/A     Occupational History    Not on file. Social History Main Topics    Smoking status: Never Smoker    Smokeless tobacco: Never Used    Alcohol use No    Drug use: No    Sexual activity: No     Other Topics Concern    Not on file     Social History Narrative         ALLERGIES: Codeine and Meperidine    Review of Systems   Constitutional: Positive for malaise/fatigue. Negative for chills and fever. Cardiovascular: Positive for syncope. Negative for chest pain. Gastrointestinal: Positive for abdominal pain. Negative for anal bleeding and melena. Musculoskeletal: Positive for back pain (chronic). Neurological: Positive for syncope and light-headedness. Psychiatric/Behavioral: Negative for confusion. All other systems reviewed and are negative. Vitals:    08/07/17 2040 08/07/17 2100 08/07/17 2101 08/07/17 2110   BP: 123/65 108/59  107/55   Pulse: 99 95 99 95   Resp: 21  18 14   Temp:       SpO2: 100% 100% 100% 100%   Weight:       Height:                Physical Exam   Constitutional: She is oriented to person, place, and time. She appears well-developed and well-nourished. She appears ill. She appears distressed (in pain). HENT:   Head: Normocephalic and atraumatic. Neck: Normal range of motion. Neck supple. Cardiovascular: Normal rate and regular rhythm. Pulmonary/Chest: Effort normal and breath sounds normal. No respiratory distress. She has no wheezes. She has no rales. Abdominal: Soft. She exhibits no distension. There is tenderness (diffusely). There is no rebound and no guarding. Musculoskeletal: Normal range of motion. She exhibits no edema. Neurological: She is alert and oriented to person, place, and time. No cranial nerve deficit. Skin: Skin is warm and dry. No rash noted. She is not diaphoretic. No erythema. Nursing note and vitals reviewed. MDM  Number of Diagnoses or Management Options  Dehydration:   Diarrhea, unspecified type:   Lactic acidosis:   Syncope and collapse:   Diagnosis management comments: Patient is being followed by neurology for syncope and back pain. She has an elevated white count and lactic acidosis with diarrhea and abdominal pain so a CT was done and she was given rocephin and flagyl. She was given 2 liters of fluid. Lactic still 3 so 3rd liter given. Urine and CT neg. Admit to hospitalist.  I think her lactic is elevated from dehydration/metformin since I did not find a source of infection.          Amount and/or Complexity of Data Reviewed  Clinical lab tests: ordered and reviewed  Review and summarize past medical records: yes  Discuss the patient with other providers: yes  Independent visualization of images, tracings, or specimens: yes (NSR, no ST elevation, nl QRS)    Risk of Complications, Morbidity, and/or Mortality  Presenting problems: high  Diagnostic procedures: moderate  Management options: high    Patient Progress  Patient progress: improved    ED Course       Procedures

## 2017-08-09 VITALS
SYSTOLIC BLOOD PRESSURE: 121 MMHG | TEMPERATURE: 98.3 F | HEIGHT: 63 IN | OXYGEN SATURATION: 97 % | RESPIRATION RATE: 20 BRPM | WEIGHT: 160 LBS | BODY MASS INDEX: 28.35 KG/M2 | HEART RATE: 85 BPM | DIASTOLIC BLOOD PRESSURE: 74 MMHG

## 2017-08-09 LAB
ANION GAP BLD CALC-SCNC: 7 MMOL/L (ref 7–16)
BACTERIA SPEC CULT: ABNORMAL
BACTERIA SPEC CULT: ABNORMAL
BUN SERPL-MCNC: 6 MG/DL (ref 6–23)
CALCIUM SERPL-MCNC: 8 MG/DL (ref 8.3–10.4)
CHLORIDE SERPL-SCNC: 115 MMOL/L (ref 98–107)
CO2 SERPL-SCNC: 24 MMOL/L (ref 21–32)
CREAT SERPL-MCNC: 0.61 MG/DL (ref 0.6–1)
ERYTHROCYTE [DISTWIDTH] IN BLOOD BY AUTOMATED COUNT: 13.7 % (ref 11.9–14.6)
GLUCOSE BLD STRIP.AUTO-MCNC: 108 MG/DL (ref 65–100)
GLUCOSE BLD STRIP.AUTO-MCNC: 123 MG/DL (ref 65–100)
GLUCOSE SERPL-MCNC: 113 MG/DL (ref 65–100)
GRAM STN SPEC: ABNORMAL
HCT VFR BLD AUTO: 34.3 % (ref 35.8–46.3)
HGB BLD-MCNC: 11.4 G/DL (ref 11.7–15.4)
MCH RBC QN AUTO: 27.5 PG (ref 26.1–32.9)
MCHC RBC AUTO-ENTMCNC: 33.2 G/DL (ref 31.4–35)
MCV RBC AUTO: 82.7 FL (ref 79.6–97.8)
PLATELET # BLD AUTO: 149 K/UL (ref 150–450)
PMV BLD AUTO: 9.7 FL (ref 10.8–14.1)
POTASSIUM SERPL-SCNC: 3.6 MMOL/L (ref 3.5–5.1)
RBC # BLD AUTO: 4.15 M/UL (ref 4.05–5.25)
SERVICE CMNT-IMP: ABNORMAL
SODIUM SERPL-SCNC: 146 MMOL/L (ref 136–145)
WBC # BLD AUTO: 5.1 K/UL (ref 4.3–11.1)

## 2017-08-09 PROCEDURE — 74011250637 HC RX REV CODE- 250/637: Performed by: HOSPITALIST

## 2017-08-09 PROCEDURE — 36415 COLL VENOUS BLD VENIPUNCTURE: CPT | Performed by: HOSPITALIST

## 2017-08-09 PROCEDURE — 74011250636 HC RX REV CODE- 250/636: Performed by: HOSPITALIST

## 2017-08-09 PROCEDURE — 74011636637 HC RX REV CODE- 636/637: Performed by: HOSPITALIST

## 2017-08-09 PROCEDURE — 80048 BASIC METABOLIC PNL TOTAL CA: CPT | Performed by: HOSPITALIST

## 2017-08-09 PROCEDURE — 94640 AIRWAY INHALATION TREATMENT: CPT

## 2017-08-09 PROCEDURE — 82962 GLUCOSE BLOOD TEST: CPT

## 2017-08-09 PROCEDURE — 74011000250 HC RX REV CODE- 250: Performed by: EMERGENCY MEDICINE

## 2017-08-09 PROCEDURE — 97165 OT EVAL LOW COMPLEX 30 MIN: CPT

## 2017-08-09 PROCEDURE — 74011000250 HC RX REV CODE- 250: Performed by: HOSPITALIST

## 2017-08-09 PROCEDURE — 94760 N-INVAS EAR/PLS OXIMETRY 1: CPT

## 2017-08-09 PROCEDURE — 85027 COMPLETE CBC AUTOMATED: CPT | Performed by: HOSPITALIST

## 2017-08-09 RX ADMIN — ATORVASTATIN CALCIUM 20 MG: 10 TABLET, FILM COATED ORAL at 08:30

## 2017-08-09 RX ADMIN — INSULIN LISPRO 4 UNITS: 100 INJECTION, SOLUTION INTRAVENOUS; SUBCUTANEOUS at 12:21

## 2017-08-09 RX ADMIN — ALBUTEROL SULFATE 2.5 MG: 2.5 SOLUTION RESPIRATORY (INHALATION) at 07:14

## 2017-08-09 RX ADMIN — SODIUM CHLORIDE 150 ML/HR: 900 INJECTION, SOLUTION INTRAVENOUS at 02:27

## 2017-08-09 RX ADMIN — BUDESONIDE 500 MCG: 0.5 INHALANT RESPIRATORY (INHALATION) at 07:14

## 2017-08-09 RX ADMIN — CARISOPRODOL 350 MG: 350 TABLET ORAL at 12:20

## 2017-08-09 RX ADMIN — METRONIDAZOLE 500 MG: 500 INJECTION, SOLUTION INTRAVENOUS at 05:30

## 2017-08-09 RX ADMIN — INSULIN LISPRO 4 UNITS: 100 INJECTION, SOLUTION INTRAVENOUS; SUBCUTANEOUS at 08:37

## 2017-08-09 RX ADMIN — POTASSIUM CHLORIDE 40 MEQ: 20 TABLET, EXTENDED RELEASE ORAL at 08:30

## 2017-08-09 RX ADMIN — Medication 10 ML: at 05:32

## 2017-08-09 NOTE — PROGRESS NOTES
Initial visit by  to convey care and concern and encourage patient that  services are available if desired. No needs were voiced during the visit. Provided business card for future reference.      Adam Omalley 66  Board Certified

## 2017-08-09 NOTE — DISCHARGE INSTRUCTIONS
DISCHARGE SUMMARY from Nurse    The following personal items are in your possession at time of discharge:    Dental Appliances: None  Visual Aid: At bedside        Jewelry: Watch     Other Valuables: Cell Phone             PATIENT INSTRUCTIONS:    After general anesthesia or intravenous sedation, for 24 hours or while taking prescription Narcotics:  · Limit your activities  · Do not drive and operate hazardous machinery  · Do not make important personal or business decisions  · Do  not drink alcoholic beverages  · If you have not urinated within 8 hours after discharge, please contact your surgeon on call. Report the following to your surgeon:  · Excessive pain, swelling, redness or odor of or around the surgical area  · Temperature over 100.5  · Nausea and vomiting lasting longer than 4 hours or if unable to take medications  · Any signs of decreased circulation or nerve impairment to extremity: change in color, persistent  numbness, tingling, coldness or increase pain  · Any questions        What to do at Home:  Recommended activity: Activity as tolerated    If you experience any of the following symptoms increased diarrhea, abdominal pain, or fever greater than 101, please follow up with your primary care physician. *  Please give a list of your current medications to your Primary Care Provider. *  Please update this list whenever your medications are discontinued, doses are      changed, or new medications (including over-the-counter products) are added. *  Please carry medication information at all times in case of emergency situations. These are general instructions for a healthy lifestyle:    No smoking/ No tobacco products/ Avoid exposure to second hand smoke    Surgeon General's Warning:  Quitting smoking now greatly reduces serious risk to your health.     Obesity, smoking, and sedentary lifestyle greatly increases your risk for illness    A healthy diet, regular physical exercise & weight monitoring are important for maintaining a healthy lifestyle    You may be retaining fluid if you have a history of heart failure or if you experience any of the following symptoms:  Weight gain of 3 pounds or more overnight or 5 pounds in a week, increased swelling in our hands or feet or shortness of breath while lying flat in bed. Please call your doctor as soon as you notice any of these symptoms; do not wait until your next office visit. Recognize signs and symptoms of STROKE:    F-face looks uneven    A-arms unable to move or move unevenly    S-speech slurred or non-existent    T-time-call 911 as soon as signs and symptoms begin-DO NOT go       Back to bed or wait to see if you get better-TIME IS BRAIN. Warning Signs of HEART ATTACK     Call 911 if you have these symptoms:   Chest discomfort. Most heart attacks involve discomfort in the center of the chest that lasts more than a few minutes, or that goes away and comes back. It can feel like uncomfortable pressure, squeezing, fullness, or pain.  Discomfort in other areas of the upper body. Symptoms can include pain or discomfort in one or both arms, the back, neck, jaw, or stomach.  Shortness of breath with or without chest discomfort.  Other signs may include breaking out in a cold sweat, nausea, or lightheadedness. Don't wait more than five minutes to call 911 - MINUTES MATTER! Fast action can save your life. Calling 911 is almost always the fastest way to get lifesaving treatment. Emergency Medical Services staff can begin treatment when they arrive -- up to an hour sooner than if someone gets to the hospital by car. The discharge information has been reviewed with the patient. The patient verbalized understanding. Discharge medications reviewed with the patient and appropriate educational materials and side effects teaching were provided.                Diarrhea: Care Instructions  Your Care Instructions    Diarrhea is loose, watery stools (bowel movements). The exact cause is often hard to find. Sometimes diarrhea is your body's way of getting rid of what caused an upset stomach. Viruses, food poisoning, and many medicines can cause diarrhea. Some people get diarrhea in response to emotional stress, anxiety, or certain foods. Almost everyone has diarrhea now and then. It usually isn't serious, and your stools will return to normal soon. The important thing to do is replace the fluids you have lost, so you can prevent dehydration. The doctor has checked you carefully, but problems can develop later. If you notice any problems or new symptoms, get medical treatment right away. Follow-up care is a key part of your treatment and safety. Be sure to make and go to all appointments, and call your doctor if you are having problems. It's also a good idea to know your test results and keep a list of the medicines you take. How can you care for yourself at home? · Watch for signs of dehydration, which means your body has lost too much water. Dehydration is a serious condition and should be treated right away. Signs of dehydration are:  ¨ Increasing thirst and dry eyes and mouth. ¨ Feeling faint or lightheaded. ¨ Darker urine, and a smaller amount of urine than normal.  · To prevent dehydration, drink plenty of fluids, enough so that your urine is light yellow or clear like water. Choose water and other caffeine-free clear liquids until you feel better. If you have kidney, heart, or liver disease and have to limit fluids, talk with your doctor before you increase the amount of fluids you drink. · Begin eating small amounts of mild foods the next day, if you feel like it. ¨ Try yogurt that has live cultures of Lactobacillus. (Check the label.)  ¨ Avoid spicy foods, fruits, alcohol, and caffeine until 48 hours after all symptoms are gone. ¨ Avoid chewing gum that contains sorbitol.   ¨ Avoid dairy products (except for yogurt with Lactobacillus) while you have diarrhea and for 3 days after symptoms are gone. · The doctor may recommend that you take over-the-counter medicine, such as loperamide (Imodium), if you still have diarrhea after 6 hours. Read and follow all instructions on the label. Do not use this medicine if you have bloody diarrhea, a high fever, or other signs of serious illness. Call your doctor if you think you are having a problem with your medicine. When should you call for help? Call 911 anytime you think you may need emergency care. For example, call if:  · You passed out (lost consciousness). · Your stools are maroon or very bloody. Call your doctor now or seek immediate medical care if:  · You are dizzy or lightheaded, or you feel like you may faint. · Your stools are black and look like tar, or they have streaks of blood. · You have new or worse belly pain. · You have symptoms of dehydration, such as:  ¨ Dry eyes and a dry mouth. ¨ Passing only a little dark urine. ¨ Feeling thirstier than usual.  · You have a new or higher fever. Watch closely for changes in your health, and be sure to contact your doctor if:  · Your diarrhea is getting worse. · You see pus in the diarrhea. · You are not getting better after 2 days (48 hours). Where can you learn more? Go to http://lyndon-mary ellen.info/. Enter B631 in the search box to learn more about \"Diarrhea: Care Instructions. \"  Current as of: March 20, 2017  Content Version: 11.3  © 0046-8104 [employer] Care instructions adapted under license by [employer] (which disclaims liability or warranty for this information). If you have questions about a medical condition or this instruction, always ask your healthcare professional. [address] any warranty or liability for your use of this information.

## 2017-08-09 NOTE — DISCHARGE SUMMARY
Hospitalist Discharge Summary     Patient ID:  Bharat Hicks  [id number]  89 y.o.  1960  Admit date: 8/7/2017  8:25 PM  Discharge date and time: 8/9/2017  Attending: Melinda Bhat MD  PCP:  Eliane Rios MD  Treatment Team: Attending Provider: Melinda Bhat MD; Utilization Review: Raven Almaguer RN    Principal Diagnosis <principal problem not specified>   Active Problems:    Hypotension (8/8/2017)      Volume depletion (8/8/2017)      Viral gastroenteritis (8/8/2017)      Acute renal failure (ARF) ([facility] [address] (8/8/2017)      SIRS (systemic inflammatory response syndrome) ([facility] (8/8/2017)         From H&P:  \"54 years old female with pmhx of DM-2, HTN, dyslipidemia, anxiety, depression, ? Svt, who presented with one day history of watery diarrhea and syncopal episode. Pt was in her usual health until yesterday when she started having sudden onset of watery diarrhea, had about 5-7 episodes, no mucus or blood seen. It was associated with some abdominal discomfort, which has resolved, denies any nausea or vomiting. She denies any fever, chills, night sweats. Pt also reports of passing out for unknown duration of time, and following the episode feels fatigue and dizzy/lightheaded, has chronic back pain. Denies chest pain, SOB, urinary symptoms, headache, blurry vision, numbness/tingling in extremities, head trauma, seizure like activity. In ER, pt was hypotensive with SBP ~ 80-90, that responded to 3 ltrs of fluid bolus. Also, had hypokalemia of 2.7 and creatinine of 1.41 suggestive of acute renal failure. Lactic acid 3.2 --> 3.0. Hospitalist team asked to admit in view of above symptoms and findings. \"    Hospital Course: The patient was admitted and started on IVFs and Flagyl prophylactically. Stool cultures and C. Diff was ordered, but the patient's diarrhea quickly resolved so they were never sent to lab. The patient's lactic acid returned to normal. She tolerated a diet.  This was likely a viral gastroenteritis. She was discharged home in stable condition. She will resume her home medications. Significant Diagnostic Studies:       Labs: Results:       Chemistry Recent Labs      08/09/17 0600 08/07/17 2045   GLU  113*  209*   NA  146*  140   K  3.6  2.7*   CL  115*  101   CO2  24  25   BUN  6  22   CREA  0.61  1.41*   CA  8.0*  8.7   AGAP  7  14   AP   --   103   TP   --   7.4   ALB   --   3.8   GLOB   --   3.6*   AGRAT   --   1.1*      CBC w/Diff Recent Labs      08/09/17 0600 08/07/17 2045   WBC  5.1  14.8*   RBC  4.15  4.93   HGB  11.4*  13.9   HCT  34.3*  40.3   PLT  149*  181   GRANS   --   92*   LYMPH   --   5*   EOS   --   0*      Cardiac Enzymes No results for input(s): CPK, CKND1, EVAN in the last 72 hours. No lab exists for component: CKRMB, TROIP   Coagulation No results for input(s): PTP, INR, APTT in the last 72 hours. No lab exists for component: INREXT    Lipid Panel Lab Results   Component Value Date/Time    Cholesterol, total 202 03/28/2017 03:10 PM    HDL Cholesterol 43 03/28/2017 03:10 PM    LDL,Direct 120 03/14/2012 06:46 AM    LDL, calculated 116 03/28/2017 03:10 PM    VLDL, calculated 43 03/28/2017 03:10 PM    Triglyceride 217 03/28/2017 03:10 PM    CHOL/HDL Ratio 5.9 03/14/2012 06:46 AM      BNP No results for input(s): BNPP in the last 72 hours. Liver Enzymes Recent Labs      08/07/17 2045   TP  7.4   ALB  3.8   AP  103   SGOT  19      Thyroid Studies Lab Results   Component Value Date/Time    TSH 1.590 07/18/2017 03:20 PM            Discharge Exam:  Visit Vitals    /74    Pulse 85    Temp 98.3 °F (36.8 °C)    Resp 20    Ht 5' 3\" (1.6 m)    Wt 72.6 kg (160 lb)    SpO2 97%    BMI 28.34 kg/m2     General appearance: alert, cooperative, no distress, appears stated age  Lungs: clear to auscultation bilaterally  Heart: regular rate and rhythm, S1, S2 normal, no murmur, click, rub or gallop  Abdomen: soft, non-tender.  Bowel sounds normal. No masses,  no organomegaly  Extremities: no cyanosis or edema  Neurologic: Grossly normal    Disposition: home  Discharge Condition: stable  Patient Instructions:   Current Discharge Medication List      CONTINUE these medications which have NOT CHANGED    Details   traMADol (ULTRAM) 50 mg tablet Take 1-2 Tabs by mouth every six (6) hours as needed for Pain. Max Daily Amount: 400 mg. Qty: 24 Tab, Refills: 0      cyclobenzaprine (FLEXERIL) 10 mg tablet Take 1 Tab by mouth three (3) times daily as needed for Muscle Spasm(s). Qty: 15 Tab, Refills: 0      dapagliflozin (FARXIGA) 5 mg tab tablet Take 1 Tab by mouth daily. Qty: 90 Tab, Refills: 1    Associated Diagnoses: Controlled type 2 diabetes mellitus without complication, unspecified long term insulin use status (HCC)      losartan-hydroCHLOROthiazide (HYZAAR) 100-25 mg per tablet Take 1 Tab by mouth daily. Qty: 90 Tab, Refills: 1    Associated Diagnoses: Essential hypertension      atorvastatin (LIPITOR) 20 mg tablet Take 1 Tab by mouth daily. Qty: 90 Tab, Refills: 1    Associated Diagnoses: Dyslipidemia      potassium chloride SA (MICRO-K) 10 mEq capsule Take 1 Cap by mouth two (2) times a day. Qty: 180 Cap, Refills: 1    Associated Diagnoses: Essential hypertension      carisoprodol (SOMA) 350 mg tablet Take 1 Tab by mouth four (4) times daily. Max Daily Amount: 1,400 mg. Qty: 20 Tab, Refills: 0    Associated Diagnoses: Acute midline low back pain without sciatica      diclofenac EC (VOLTAREN) 75 mg EC tablet Take 1 Tab by mouth two (2) times a day. Qty: 180 Tab, Refills: 1    Associated Diagnoses: Acute midline low back pain without sciatica      levalbuterol (XOPENEX) 1.25 mg/3 mL nebu 3 mL by Nebulization route every four (4) hours as needed.   Qty: 30 Nebule, Refills: 11      meclizine (ANTIVERT) 25 mg tablet TK 1 T PO TID FOR UP TO 10 DAYS PRN  Refills: 2      omega-3 acid ethyl esters (LOVAZA) 1 gram capsule Take 2 Caps by mouth two (2) times a day.  Qty: 120 Cap, Refills: 5    Associated Diagnoses: Dyslipidemia      fluticasone-salmeterol (ADVAIR DISKUS) 250-50 mcg/dose diskus inhaler Take 1 Puff by inhalation every twelve (12) hours. Qty: 1 Inhaler, Refills: 12      albuterol (VENTOLIN HFA) 90 mcg/actuation inhaler Take 2 Puffs by inhalation every four (4) hours as needed for Wheezing. Qty: 1 Inhaler, Refills: 11      ondansetron (ZOFRAN ODT) 4 mg disintegrating tablet Take 1 tablet by mouth every eight (8) hours as needed. Qty: 20 tablet, Refills: 1      promethazine (PHENERGAN) 25 mg tablet Take 1 tablet by mouth every six (6) hours as needed.   Qty: 12 tablet, Refills: 0             Activity: Activity as tolerated  Diet: Regular Diet  Wound Care: None needed    Follow-up  ·   Dr. Willy Crane in one week  Time spent to discharge patient 35 minutes  Signed:  Franklin Serrato DO  8/9/2017  12:50 PM

## 2017-08-09 NOTE — PROGRESS NOTES
Sepsis Recovery Guide provided to and reviewed with patient. Thermometer provided to patient with instructions on how often to track temperature at home. Opportunity for questions was provided. Instructed to follow up with primary care physician with any concerns once home. Discharge instructions given and reviewed with pt, verbalizes understanding, pt needs ride home, will arrange taxi.

## 2017-08-09 NOTE — PROGRESS NOTES
Problem: Self Care Deficits Care Plan (Adult)  Goal: *Acute Goals and Plan of Care (Insert Text)      OCCUPATIONAL THERAPY: Initial Assessment and Discharge 8/9/2017  INPATIENT: Hospital Day: 3  Payor: Leandro Sandoval / Plan: Jose Maria Frame / Product Type: PPO /      NAME/AGE/GENDER: Fransico Downs is a 60 y.o. female            PRIMARY DIAGNOSIS:  Viral gastroenteritis  Hypotension  Volume depletion  SIRS (systemic inflammatory response syndrome) ([facility] [address]  Acute renal failure (ARF) (HCC) <principal problem not specified> <principal problem not specified>        ICD-10: Treatment Diagnosis:        · Dizziness and Giddiness (R42)   Precautions/Allergies:         Codeine and Meperidine       ASSESSMENT:      Ms. Paul Hauser presents sitting in chair, agreeable to therapy. Pt is oriented x 4 reports she \"passed out\" coming from her bathroom heading towards her bed. She states she was aware that she felt like she was going to pass out. 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 Currently, pt c/o weakness bilateral LEs, back pain, and mild dizziness with visual assessment. Pt appears to be at baseline for ADLs. Will defer activity tolerance and balance deficits to PT. Further OT is not indicated at this time. Pt returned to sit in chair with all needs in place, RN aware. Pt is concerned about MRI scheduled tonight, SW made aware. This section established at most recent assessment     1. RECOMMENDED REHABILITATION/EQUIPMENT: (at time of discharge pending progress): defer to PT. OCCUPATIONAL PROFILE AND HISTORY:   History of Present Injury/Illness (Reason for Referral):  Hypotension, SIRS, ARF  Past Medical History/Comorbidities:   Ms. Paul Hauser  has a past medical history of Anxiety; Arrhythmia; Asthma (2/4/2010); Depression; Diabetes mellitus type 2, controlled, without complications ([facility] [address]  Dyslipidemia (2010); Hypertension; Migraines; SVT (supraventricular tachycardia) ([facility] [address] and Vitamin D deficiency. Ms. Rei Juares  has a past surgical history that includes  section (); abdomen surgery proc unlisted; tonsillectomy (as child); tubal ligation (Bilateral); kash and bso (); and lap cholecystectomy ().   Social History/Living Environment:   Home Environment: Trailer/mobile home  # Steps to Enter: 5  Rails to Enter: Yes  One/Two Story Residence: One story  Living Alone: No  Support Systems: Child(laura)  Patient Expects to be Discharged to[de-identified] Private residence  Current DME Used/Available at Home: None  Prior Level of Function/Work/Activity:  ind      Number of Personal Factors/Comorbidities that affect the Plan of Care: Expanded review of therapy/medical records (1-2):  MODERATE COMPLEXITY   ASSESSMENT OF OCCUPATIONAL PERFORMANCE[de-identified]   Activities of Daily Living:           Basic ADLs (From Assessment) Complex ADLs (From Assessment)   Basic ADL  Feeding: Independent  Oral Facial Hygiene/Grooming: Independent  Bathing: Supervision  Upper Body Dressing: Independent  Lower Body Dressing: Modified independent  Toileting: Modified independent     Grooming/Bathing/Dressing Activities of Daily Living     Cognitive Retraining  Safety/Judgement: Fall prevention                 Functional Transfers  Toilet Transfer : Supervision  Shower Transfer: Supervision     Bed/Mat Mobility  Sit to Stand: Supervision  Bed to Chair: Supervision          Most Recent Physical Functioning:   Gross Assessment:  AROM: Within functional limits  Strength: Generally decreased, functional               Posture:     Balance:  Sitting: Intact  Standing: Impaired  Standing - Static: Fair  Standing - Dynamic : Fair Bed Mobility:     Wheelchair Mobility:     Transfers:  Sit to Stand: Supervision  Stand to Sit: Supervision  Bed to Chair: Supervision                 Patient Vitals for the past 6 hrs:       BP SpO2 Pulse   17 1112 121/74 97 % 85        Mental Status  Neurologic State: Alert  Orientation Level: Oriented X4  Cognition: Appropriate for age attention/concentration, Follows commands  Perception: Appears intact  Perseveration: No perseveration noted  Safety/Judgement: Fall prevention                               Physical Skills Involved:  1. Balance  2. Activity Tolerance  3. Pain (Chronic) Cognitive Skills Affected (resulting in the inability to perform in a timely and safe manner):  1. WFLs  Psychosocial Skills Affected:  1. Habits/Routines  2. Environmental Adaptation  3. Self-Awareness   Number of elements that affect the Plan of Care: 3-5:  MODERATE COMPLEXITY   CLINICAL DECISION MAKIN[address] [place] [address] AM-PAC 6 Clicks   Daily Activity Inpatient Short Form  How much help from another person does the patient currently need. .. Total A Lot A Little None   1. Putting on and taking off regular lower body clothing?   [ ] 1   [ ] 2   [ ] 3   [X] 4   2. Bathing (including washing, rinsing, drying)? [ ] 1   [ ] 2   [ ] 3   [X] 4   3. Toileting, which includes using toilet, bedpan or urinal?   [ ] 1   [ ] 2   [ ] 3   [X] 4   4. Putting on and taking off regular upper body clothing?   [ ] 1   [ ] 2   [ ] 3   [X] 4   5. Taking care of personal grooming such as brushing teeth? [ ] 1   [ ] 2   [ ] 3   [X] 4   6. Eating meals? [ ] 1   [ ] 2   [ ] 3   [X] 4   © , Trustees of [address] [place] [address], under license to [employer]. All rights reserved    Score:  Initial: 24 Most Recent: X (Date: -- )     Interpretation of Tool:  Represents activities that are increasingly more difficult (i.e. Bed mobility, Transfers, Gait).        Score 24 23 22-20 19-15 14-10 9-7 6       Modifier CH CI CJ CK CL CM CN         · Self Care:               - CURRENT STATUS:    CH - 0% impaired, limited or restricted               - GOAL STATUS:           CH - 0% impaired, limited or restricted               - D/C STATUS: CH - 0% impaired, limited or restricted  Payor: Sandra Quevedo / Plan: Jael April / Product Type: PPO /           Use of outcome tool(s) and clinical judgement create a POC that gives a: LOW COMPLEXITY             TREATMENT:   (In addition to Assessment/Re-Assessment sessions the following treatments were rendered)      Pre-treatment Symptoms/Complaints:    Pain: Initial:   Pain Intensity 1: 0  Post Session:  0      Assessment/Reassessment only, no treatment provided today     Braces/Orthotics/Lines/Etc:   · O2 Device: Room air  Treatment/Session Assessment:    · Response to Treatment:  D/C  · Interdisciplinary Collaboration:  · Occupational Therapist  · Registered Nurse  ·   · After treatment position/precautions:  · Up in chair  · Bed/Chair-wheels locked  · Call light within reach  · RN notified  · Compliance with Program/Exercises: Will assess as treatment progresses. · Recommendations/Intent for next treatment session: \"Next visit will focus on advancements to more challenging activities and reduction in assistance provided\".   Total Treatment Duration:  OT Patient Time In/Time Out  Time In: 0920  Time Out: [address], OTR/L

## 2017-08-10 ENCOUNTER — PATIENT OUTREACH (OUTPATIENT)
Dept: OTHER | Age: 57
End: 2017-08-10

## 2017-08-10 NOTE — LETTER
8/10/2017 9:21 AM 
 
Ms. Saturnino Heart [address] [place] [phone] Dear Ms. Wellington Christie, My name is Lele Ramirez , Employee Care Manager for [employer], and I have been trying to reach you. The Employee Care  is a free-of-charge, confidential service provided to our employees and their family members covered by the [employer]. Part of my job is to follow up with members who have recently been in the hospital or emergency room, to help them coordinate their care and answer questions they may have about their visit. I am able to provide assistance with medication questions, scheduling needed follow-up appointments, and arranging services like home health or home medical equipment. I can also provide education regarding your hospital or ER visit as well as your medical conditions. As healthcare providers, we know that patients do better when they have close follow up with a primary care provider (PCP), especially after a hospital or emergency department visit. If you do not have a PCP, I can help you find one that is convenient to you and covered by your insurance. I can also help you understand any after visit instructions, such as what symptoms to watch out for, or any new or changed medications. Remember that you can access your After Visit Summary by logging into your [employer] account. If you do not have a [employer] account, I can help you request access. Our program is designed to provide you with the opportunity to have a [employer] care manager partner with you for your healthcare needs. Please contact me at the below number if I can provide you with assistance for any of the above services. Sincerely, Lele Ramirez RN 
[phone]

## 2017-08-10 NOTE — PROGRESS NOTES
Third attempt to reach patient for [facility] Program, and discharge assessment. Unable to leave . Will send UTR letter.

## 2017-08-11 ENCOUNTER — PATIENT OUTREACH (OUTPATIENT)
Dept: OTHER | Age: 57
End: 2017-08-11

## 2017-08-12 LAB
BACTERIA SPEC CULT: NORMAL
SERVICE CMNT-IMP: NORMAL

## 2017-09-13 ENCOUNTER — HOSPITAL ENCOUNTER (OUTPATIENT)
Dept: MRI IMAGING | Age: 57
Discharge: HOME | End: 2017-09-13
Attending: PSYCHIATRY & NEUROLOGY
Payer: COMMERCIAL

## 2017-09-13 DIAGNOSIS — R53.1 WEAKNESS: ICD-10-CM

## 2017-09-13 DIAGNOSIS — M48.02 CERVICAL STENOSIS OF SPINE: ICD-10-CM

## 2017-09-13 DIAGNOSIS — R29.6 FALLING: ICD-10-CM

## 2017-09-13 PROCEDURE — 72141 MRI NECK SPINE W/O DYE: CPT

## 2017-09-13 PROCEDURE — 72146 MRI CHEST SPINE W/O DYE: CPT

## 2017-09-14 ENCOUNTER — PATIENT OUTREACH (OUTPATIENT)
Dept: OTHER | Age: 57
End: 2017-09-14

## 2017-09-14 NOTE — LETTER
9/14/2017 11:13 AM 
 
Ms. Jesus Heart [address] [place] [id number] Dear Ms. Uri Cortez, My name is Sasha Abbasi, Employee Care Manager for [employer], and I have been trying to reach you. The Employee Care  is a free-of-charge, confidential service provided to our employees and their family members covered by the [employer] Part of my job is to follow up with members who have recently been in the hospital or emergency room, to help them coordinate their care and answer questions they may have about their visit. Due to not being able to reach you within 30 days, I am closing out the current program, but will remain available to you should you have any questions. As healthcare providers, we know that patients do better when they have close follow up with a primary care provider (PCP), especially after a hospital or emergency department visit. If you do not have a PCP, I can help you find one that is convenient to you and covered by your insurance. I can also help you understand any after visit instructions, such as what symptoms to watch out for, or any new or changed medications. Remember that you can access your After Visit Summary by logging into your [employer] account. If you do not have a [employer] account, I can help you request access. Our program is designed to provide you with the opportunity to have a [employer] care manager partner with you for your healthcare needs. Please contact me at the below number if I can provide you with assistance for any of the above services. Sincerely, Sasha Abbasi, RN 
[phone]

## 2018-01-25 ENCOUNTER — HOSPITAL ENCOUNTER (EMERGENCY)
Age: 58
Discharge: HOME | End: 2018-01-25
Attending: EMERGENCY MEDICINE
Payer: COMMERCIAL

## 2018-01-25 VITALS
HEART RATE: 94 BPM | HEIGHT: 63 IN | DIASTOLIC BLOOD PRESSURE: 68 MMHG | OXYGEN SATURATION: 94 % | BODY MASS INDEX: 29.23 KG/M2 | SYSTOLIC BLOOD PRESSURE: 143 MMHG | TEMPERATURE: 98.1 F | WEIGHT: 165 LBS | RESPIRATION RATE: 20 BRPM

## 2018-01-25 DIAGNOSIS — G44.89 OTHER HEADACHE SYNDROME: Primary | ICD-10-CM

## 2018-01-25 LAB
ALBUMIN SERPL-MCNC: 4 G/DL (ref 3.5–5)
ALBUMIN/GLOB SERPL: 1 {RATIO} (ref 1.2–3.5)
ALP SERPL-CCNC: 108 U/L (ref 50–136)
ALT SERPL-CCNC: 23 U/L (ref 12–65)
ANION GAP SERPL CALC-SCNC: 11 MMOL/L (ref 7–16)
AST SERPL-CCNC: 12 U/L (ref 15–37)
BASOPHILS # BLD: 0 K/UL (ref 0–0.2)
BASOPHILS NFR BLD: 0 % (ref 0–2)
BILIRUB SERPL-MCNC: 0.7 MG/DL (ref 0.2–1.1)
BUN SERPL-MCNC: 13 MG/DL (ref 6–23)
CALCIUM SERPL-MCNC: 9.9 MG/DL (ref 8.3–10.4)
CHLORIDE SERPL-SCNC: 97 MMOL/L (ref 98–107)
CO2 SERPL-SCNC: 30 MMOL/L (ref 21–32)
CREAT SERPL-MCNC: 0.7 MG/DL (ref 0.6–1)
DIFFERENTIAL METHOD BLD: ABNORMAL
EOSINOPHIL # BLD: 0.1 K/UL (ref 0–0.8)
EOSINOPHIL NFR BLD: 1 % (ref 0.5–7.8)
ERYTHROCYTE [DISTWIDTH] IN BLOOD BY AUTOMATED COUNT: 13.6 % (ref 11.9–14.6)
GLOBULIN SER CALC-MCNC: 3.9 G/DL (ref 2.3–3.5)
GLUCOSE SERPL-MCNC: 174 MG/DL (ref 65–100)
HCT VFR BLD AUTO: 41.2 % (ref 35.8–46.3)
HGB BLD-MCNC: 14 G/DL (ref 11.7–15.4)
IMM GRANULOCYTES # BLD: 0 K/UL (ref 0–0.5)
IMM GRANULOCYTES NFR BLD AUTO: 0 % (ref 0–5)
LYMPHOCYTES # BLD: 2.2 K/UL (ref 0.5–4.6)
LYMPHOCYTES NFR BLD: 21 % (ref 13–44)
MCH RBC QN AUTO: 28 PG (ref 26.1–32.9)
MCHC RBC AUTO-ENTMCNC: 34 G/DL (ref 31.4–35)
MCV RBC AUTO: 82.4 FL (ref 79.6–97.8)
MONOCYTES # BLD: 0.6 K/UL (ref 0.1–1.3)
MONOCYTES NFR BLD: 6 % (ref 4–12)
NEUTS SEG # BLD: 7.2 K/UL (ref 1.7–8.2)
NEUTS SEG NFR BLD: 72 % (ref 43–78)
PLATELET # BLD AUTO: 270 K/UL (ref 150–450)
PMV BLD AUTO: 9.8 FL (ref 10.8–14.1)
POTASSIUM SERPL-SCNC: 3 MMOL/L (ref 3.5–5.1)
PROT SERPL-MCNC: 7.9 G/DL (ref 6.3–8.2)
RBC # BLD AUTO: 5 M/UL (ref 4.05–5.25)
SODIUM SERPL-SCNC: 138 MMOL/L (ref 136–145)
WBC # BLD AUTO: 10.1 K/UL (ref 4.3–11.1)

## 2018-01-25 PROCEDURE — 99284 EMERGENCY DEPT VISIT MOD MDM: CPT | Performed by: EMERGENCY MEDICINE

## 2018-01-25 PROCEDURE — 96374 THER/PROPH/DIAG INJ IV PUSH: CPT | Performed by: EMERGENCY MEDICINE

## 2018-01-25 PROCEDURE — 85025 COMPLETE CBC W/AUTO DIFF WBC: CPT | Performed by: EMERGENCY MEDICINE

## 2018-01-25 PROCEDURE — 74011250636 HC RX REV CODE- 250/636: Performed by: EMERGENCY MEDICINE

## 2018-01-25 PROCEDURE — 96375 TX/PRO/DX INJ NEW DRUG ADDON: CPT | Performed by: EMERGENCY MEDICINE

## 2018-01-25 PROCEDURE — 80053 COMPREHEN METABOLIC PANEL: CPT | Performed by: EMERGENCY MEDICINE

## 2018-01-25 RX ORDER — ONDANSETRON 2 MG/ML
4 INJECTION INTRAMUSCULAR; INTRAVENOUS
Status: COMPLETED | OUTPATIENT
Start: 2018-01-25 | End: 2018-01-25

## 2018-01-25 RX ORDER — LABETALOL HYDROCHLORIDE 5 MG/ML
20 INJECTION, SOLUTION INTRAVENOUS
Status: DISCONTINUED | OUTPATIENT
Start: 2018-01-25 | End: 2018-01-25

## 2018-01-25 RX ORDER — POTASSIUM CHLORIDE 20 MEQ/1
20 TABLET, EXTENDED RELEASE ORAL
Status: DISCONTINUED | OUTPATIENT
Start: 2018-01-25 | End: 2018-01-25

## 2018-01-25 RX ORDER — KETOROLAC TROMETHAMINE 30 MG/ML
30 INJECTION, SOLUTION INTRAMUSCULAR; INTRAVENOUS
Status: COMPLETED | OUTPATIENT
Start: 2018-01-25 | End: 2018-01-25

## 2018-01-25 RX ADMIN — KETOROLAC TROMETHAMINE 30 MG: 30 INJECTION, SOLUTION INTRAMUSCULAR at 14:17

## 2018-01-25 RX ADMIN — ONDANSETRON 4 MG: 2 INJECTION INTRAMUSCULAR; INTRAVENOUS at 14:17

## 2018-01-25 NOTE — DISCHARGE INSTRUCTIONS

## 2018-01-25 NOTE — ED NOTES
I have reviewed discharge instructions with the patient. The patient verbalized understanding. Patient left ED via Discharge Method: ambulatory to Home with self transport. The patient is ambulatory upon exit and appears in no acute distress. The patient has discharge instructions and follow up information in hand. The patient does not have any questions at this time. Opportunity for questions and clarification provided. Patient given 0 scripts. To continue your aftercare when you leave the hospital, you may receive an automated call from our care team to check in on how you are doing. This is a free service and part of our promise to provide the best care and service to meet your aftercare needs.  If you have questions, or wish to unsubscribe from this service please call [phone]. Thank you for Choosing our [facility] Emergency Department.

## 2018-01-25 NOTE — LETTER
[address] [facility] EMERGENCY DEPT One [address] [id number] 
[phone] Work/School Note Date: 1/25/2018 To Whom It May concern: 
 
Tiny Rojas was seen and treated today in the emergency room by the following provider(s): 
Attending Provider: Chikis Herrera MD.   
 
Tiny Rojas may return to work on 1/26/18 at [address] [employer]. 
 
Sincerely, 
 
 
 
 
Parish Ruiz

## 2018-01-25 NOTE — ED PROVIDER NOTES
HPI Comments: Patient complains of headache onset yesterday. Occipital around to frontal. Nausea with no vomiting. Took Zofran - improved. Has felt like heart was beating hard in her throat. No chest pain. No fever. No cough. No SOB. History of hypertension and has been elevated since yesterday. Has not slept in almost 24 hours. Worked third shift 12 hours last night. No weakness or numbness. Patient is a 59 y.o. female presenting with headaches. The history is provided by the patient and medical records. Headache    This is a new problem. The current episode started yesterday. The problem occurs constantly. The problem has been gradually worsening. The headache is aggravated by nausea. The pain is located in the generalized region. The quality of the pain is described as throbbing. The pain is moderate. Associated symptoms include palpitations and nausea. Pertinent negatives include no fever, no shortness of breath, no weakness and no vomiting. She has tried nothing for the symptoms.         Past Medical History:   Diagnosis Date    Anxiety     Arrhythmia     hx svt    Asthma 2010    Depression     Diabetes mellitus type 2, controlled, without complications ([facility] [address]     Dyslipidemia 2010    Hypertension     Migraines     SVT (supraventricular tachycardia) (HCC)     Vitamin D deficiency        Past Surgical History:   Procedure Laterality Date    ABDOMEN SURGERY PROC UNLISTED      HX  SECTION      HX LAP CHOLECYSTECTOMY      HX MICHI AND BSO      Hysterectomy    HX TONSILLECTOMY  as child    HX TUBAL LIGATION Bilateral              Family History:   Problem Relation Age of Onset    Cancer Mother      colon    Elevated Lipids Mother     Cancer Father      colon    Diabetes Father      T2DM    Other Brother      sleep apnea       Social History     Social History    Marital status:      Spouse name: N/A    Number of children: N/A    Years of education: N/A     Occupational History    Not on file. Social History Main Topics    Smoking status: Never Smoker    Smokeless tobacco: Never Used    Alcohol use No    Drug use: No    Sexual activity: No     Other Topics Concern    Not on file     Social History Narrative         ALLERGIES: Codeine and Meperidine    Review of Systems   Constitutional: Negative. Negative for fever. HENT: Negative for congestion and facial swelling. Eyes: Negative. Respiratory: Negative. Negative for shortness of breath. Cardiovascular: Positive for palpitations. Negative for chest pain and leg swelling. Gastrointestinal: Positive for nausea. Negative for abdominal pain and vomiting. Genitourinary: Negative. Musculoskeletal: Positive for back pain and neck pain. Neurological: Positive for syncope and headaches. Negative for weakness and numbness. Hematological: Negative. Psychiatric/Behavioral: Positive for sleep disturbance. Vitals:    01/25/18 1301 01/25/18 1405 01/25/18 1417 01/25/18 1440   BP: (!) 158/91 151/76  130/78   Pulse: (!) 112      Resp: 20      Temp:       SpO2: 94%  93% 93%   Weight:       Height:                Physical Exam   Constitutional: She is oriented to person, place, and time. She appears well-developed and well-nourished. HENT:   Head: Normocephalic and atraumatic. Mouth/Throat: Oropharynx is clear and moist.   Eyes: Conjunctivae and EOM are normal. Pupils are equal, round, and reactive to light. Neck: Normal range of motion. Neck supple. Cardiovascular: Normal rate, regular rhythm, normal heart sounds and intact distal pulses. tachycardia   Pulmonary/Chest: Effort normal and breath sounds normal.   Abdominal: Soft. Bowel sounds are normal. She exhibits no mass. There is no tenderness. Musculoskeletal: Normal range of motion. She exhibits no edema or tenderness. Neurological: She is alert and oriented to person, place, and time. No cranial nerve deficit.  She exhibits normal muscle tone. Skin: Skin is warm and dry. Psychiatric: She has a normal mood and affect. Her behavior is normal.   Nursing note and vitals reviewed.        MDM  ED Course       Procedures    Acute headache  Hypertension  Hypokalemia  Labs reviewed  Toradol 30 mg IV, Zofran 4 mg IV  Improved  BP improved  Take KCL 20 meq when gets home and rest  Fluids  Results and instructions discussed with patient and son

## 2018-01-26 ENCOUNTER — PATIENT OUTREACH (OUTPATIENT)
Dept: OTHER | Age: 58
End: 2018-01-26

## 2018-01-26 NOTE — PROGRESS NOTES
[facility] progress note    Patient eligible for [employer] Employee care management    PMH:   Past Medical History:   Diagnosis Date    Anxiety     Arrhythmia     hx svt    Asthma 2/4/2010    Depression     Diabetes mellitus type 2, controlled, without complications ([facility] [address]     Dyslipidemia 5/16/2010    Hypertension     Migraines     SV[facility]ra[facility]ricular tachycardia) ([facility])     Vitamin D deficiency        Social History:   Social History     Social History    Marital status:      Spouse name: N/A    Number of children: N/A    Years of education: N/A     Occupational History    Not on file. Social History Main Topics    Smoking status: Never Smoker    Smokeless tobacco: Never Used    Alcohol use No    Drug use: No    Sexual activity: No     Other Topics Concern    Not on file     Social History Narrative       Two patient identifiers verified. Discussed the care management program.  Patient agrees to care management services at this time. Patient has significant diagnosis of HTN and Asthma . Care management assessment completed:    Patient stated problem: \"headaches related to BP\"  Patient stated Strength: \"Providers, family\"   Patient stated Weakness: \"Finding\"    Patient current understanding of HTN is good. How does it impact daily life?  Affects :she feels and her overall health status    Emotional Status/Adjustment to Health State: good but frustrated    Barriers/Challenges to Care: work, medications not working    Patient identified Short Term Goal : Get rid of headaches and level out BP    Patient identified Long Term Goal : Long term BP control    Support System/Resources:  Family, friends    Advance Care Planning: n/a     ADLS/DME: independent    Referrals: none at present    Self-management of medications and adherence:good compliance    Motivation for change:  Asked patient, \"on a scale of 0-10, when 0 is no motivation and 10 is the most motivation, How motivated are you to make changes to manage your 9 better\"    Next scheduled follow up with Dr. Rebekah Hernandez Monday or Tuesday. Next planned call from [facility] Tuesday . Care Plan for Chronic Disease:    1. Diagnosis 1- HTN    2. Diagnosis 2- asthma    3. Key pt activities to achieve better health: Medication compliance, stress relief, healthy lifestyle choices    4. Remove barriers/challenges affecting patient's health: Stress, enough sleep    Patient verbalized understanding of all information discussed. Pt has my contact information for any further questions, concerns, or needs. Plan for next call:  Discuss Cardiology appointment and discussion about BP meds, Make sure she has an appt with PCP. Discuss sleep habits.

## 2018-02-22 ENCOUNTER — PATIENT OUTREACH (OUTPATIENT)
Dept: OTHER | Age: 58
End: 2018-02-22

## 2018-02-23 NOTE — PROGRESS NOTES
Outreach call to patient for CM. Left a vm message with my call back information and new CM information. Offered to assist in rescheduling her PCP appointment that she recently missed. No other appointments or communications noted since last contact.

## 2018-03-02 ENCOUNTER — PATIENT OUTREACH (OUTPATIENT)
Dept: OTHER | Age: 58
End: 2018-03-02

## 2018-03-09 ENCOUNTER — PATIENT OUTREACH (OUTPATIENT)
Dept: OTHER | Age: 58
End: 2018-03-09

## 2018-03-09 NOTE — PROGRESS NOTES
Telephonic outreach attempt to introduce this CM as the patient's new ECM. Left a discreet VM, provided confidential contact information. Will continue attempts to contact the patient.

## 2018-04-02 PROBLEM — M25.561 ACUTE PAIN OF RIGHT KNEE: Status: ACTIVE | Noted: 2018-04-02

## 2018-04-03 ENCOUNTER — PATIENT OUTREACH (OUTPATIENT)
Dept: OTHER | Age: 58
End: 2018-04-03

## 2018-04-03 NOTE — PROGRESS NOTES
Telephonic outreach attempt to follow up with patient regarding progress towards current goals and to officially introduce self as care manager. Left a discreet VM. Will continue attempts to contact patient.

## 2018-04-04 ENCOUNTER — HOSPITAL ENCOUNTER (OUTPATIENT)
Dept: PHYSICAL THERAPY | Age: 58
Discharge: HOME | End: 2018-04-04
Attending: FAMILY MEDICINE
Payer: COMMERCIAL

## 2018-04-04 DIAGNOSIS — M25.561 ACUTE PAIN OF RIGHT KNEE: ICD-10-CM

## 2018-04-04 PROCEDURE — 97162 PT EVAL MOD COMPLEX 30 MIN: CPT

## 2018-04-04 PROCEDURE — 97140 MANUAL THERAPY 1/> REGIONS: CPT

## 2018-04-04 NOTE — PROGRESS NOTES
Ambulatory/Rehab Services H2 Model Falls Risk Assessment    Risk Factor Pts. ·   Confusion/Disorientation/Impulsivity  []    4 ·   Symptomatic Depression  []   2 ·   Altered Elimination  []   1 ·   Dizziness/Vertigo  []   1 ·   Gender (Male)  []   1 ·   Any administered antiepileptics (anticonvulsants):  []   2 ·   Any administered benzodiazepines:  []   1 ·   Visual Impairment (specify):  []   1 ·   Portable Oxygen Use  []   1 ·   Orthostatic ? BP  []   1 ·   History of Recent Falls (within 3 mos.)  [x]   5     Ability to Rise from Chair (choose one) Pts. ·   Ability to rise in a single movement  []   0 ·   Pushes up, successful in one attempt  [x]   1 ·   Multiple attempts, but successful  []   3 ·   Unable to rise without assistance  []   4   Total: (5 or greater = High Risk) 6     Falls Prevention Plan:   [x]                Physical Limitations to Exercise (specify): performed initially in supine/sitting   [x]                Mobility Assistance Device (type): use of cane was encouraged by PT   []                Exercise/Equipment Adaptation (specify):    ©2010 [facility] [address] [place] [address] Patent #[id number].  Federal Law prohibits the replication, distribution or use without written permission from [facility]

## 2018-04-04 NOTE — THERAPY EVALUATION
Marce Kanner  : 1960  Primary: [address] [facility] [employer] [facility] [address] [facility]  [address] [facility], [address], [place], [address] [facility]  [phone]   [phone]       OUTPATIENT PHYSICAL THERAPY:Initial Assessment 2018    ICD-10: Treatment Diagnosis: Difficulty in walking, not elsewhere classified (R26.2)   Unsteadiness on feet (R26.81)   Pain in right knee (M25.561)       Precautions/Allergies:   Codeine and Meperidine   Fall Risk Score: 6 (? 5 = High Risk)  MD Orders: Evaluate and treat MEDICAL/REFERRING DIAGNOSIS:  Acute pain of right knee [M25.561]   DATE OF ONSET: 2017  REFERRING PHYSICIAN: Candido Correia.,*  RETURN PHYSICIAN APPOINTMENT: unspecified   This section established at most recent assessment  INITIAL ASSESSMENT:  Marce Kanner is a 59 y.o. female who presents to physical therapy for insidious onset of R knee pain beginning in 2017. This session, she demonstrated decreased R LE strength/endurance, significantly increased pain with all R knee mobility, multiple postural/gait deficits, and decreased functional mobility as evident by a score of 6/80 on the Lower Extremity Functional Scale (with lower scores indicating increased disability). Pt may benefit from skilled PT to address the above listed deficits to improve ability to perform pain-free ADLs/IADLs and to improve overall quality of life prior to discharge. PROBLEM LIST (Impacting functional limitations):  1. Decreased Strength  2. Decreased ADL/Functional Activities  3. Decreased Transfer Abilities  4. Decreased Ambulation Ability/Technique  5. Decreased Balance  6. Increased Pain  7. Decreased Activity Tolerance  8. Decreased Flexibility/Joint Mobility  9. Edema/Girth INTERVENTIONS PLANNED:  1. Balance Exercise  2. Bed Mobility  3. Cold  4. Family Education  5. Gait Training  6. Heat  7. Home Exercise Program (HEP)  8.  Manual Therapy  9. Neuromuscular Re-education/Strengthening  10. Range of Motion (ROM)  11. Therapeutic Activites  12. Therapeutic Exercise/Strengthening  13. Transfer Training  14. Ultrasound (US)   TREATMENT PLAN:  Effective Dates: 4/4/2018 TO 7/3/2018 (90 days). Frequency/Duration: 2 times a week for 90 Days  GOALS: (Goals have been discussed and agreed upon with patient.)  Short-Term Functional Goals: Time Frame: 45 days  1. Pt will be compliant with HEP in order to increase LE strength/endurance/mobility to improve functional mobility and overall quality of life. 2. Pt will improve score on the Lower Extremity Functional Scale to 25/80 in order to demonstrate improved functional mobility and quality of life. 3. Pt will improve score on Timed Up and Go Test to 11 seconds with the least restrictive assistive device in order to decrease fall risk and increase safety and independence during ambulation. 4. Pt will be able to ascend/descend 4 steps with S or without rail with reciprocal or step to gait pattern in order to improve community mobility. Discharge Goals: Time Frame: 90 days  1. Pt will be independent with HEP in order to increase LE strength/endurance/mobility to improve functional mobility and overall quality of life. 2. Pt will improve score on the Lower Extremity Functional Scale to 45/80 in order to demonstrate improved functional mobility and quality of life. 3. Pt will improve score on Timed Up and Go Test to 9 seconds with the least restrictive assistive device in order to decrease fall risk and increase safety and independence during ambulation. 4. Pt will be able to ascend/descend 6 steps independently without rail with reciprocal or step to gait pattern in order to improve community mobility. Rehabilitation Potential For Stated Goals: Good  Regarding Rea Babinski therapy, I certify that the treatment plan above will be carried out by a therapist or under their direction.   Thank you for this referral,  TREVOR WinchesterT     Referring Physician Signature: Vincentrolan Lincoln.,*              Date                    HISTORY:   History of Present Injury/Illness (Reason for Referral): *History per pt or pt's family except where otherwise noted  Pt states that her knee has been hurting since about 2017 (states she woke up with it hurting, not sure what caused it to start). States she usually sleeps in recliner with her legs elevated (states this is due to asthma), and when she was sleeping on her side one night, she heard her R knee \"pop,\" and it has been hurting ever since. She states there is an intense burning pain in her R anterior knee surrounding the patella along the joint line (does not radiate per pt report). She states some days the pain is worse than others. She states it feels like it is going to give out (and it has given out on her sometimes), she has difficulty walking immediately after standing, and she has significant difficulty going up/down steps. Pain at worst is 8/10 (aggravating activities include straightening her R knee after having it flexed for a long time, walking/standing > 15 minutes, trying to get up from stooped position). Pain at best is 5/10 (eases pain by trying to keep her R leg elevated and extended, avoiding weightbearing activities, Icy[employer], wearing copper sleeve). During the past month, she has fallen twice due to her R leg giving out (states that she has also passed out before due to low potassium levels). Pt states she does not smoke. Past Medical History/Comorbidities:   Ms. Jaspal Horner  has a past medical history of Anxiety; Arrhythmia; Asthma (2010); Depression; Diabetes mellitus type 2, controlled, without complications ([address] Dyslipidemia (2010); Hypertension; Migraines; SVT (supraventricular tachycardia) ([address] and Vitamin D deficiency.   Ms. Jaspal Horner  has a past surgical history that includes hx  section (); pr abdomen surgery proc unlisted; hx tonsillectomy (as child); hx tubal ligation (Bilateral); hx kash and bso (2001); and hx lap cholecystectomy (2013). 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 Current Medications:    Current Outpatient Prescriptions:     losartan-hydroCHLOROthiazide (HYZAAR) 100-25 mg per tablet, Take 1 Tab by mouth daily. , Disp: 90 Tab, Rfl: 1    atorvastatin (LIPITOR) 20 mg tablet, Take 1 Tab by mouth daily. , Disp: 90 Tab, Rfl: 1    potassium chloride SA (MICRO-K) 10 mEq capsule, Take 1 Cap by mouth two (2) times a day., Disp: 180 Cap, Rfl: 1    fluticasone-salmeterol (ADVAIR DISKUS) 250-50 mcg/dose diskus inhaler, Take 1 Puff by inhalation every twelve (12) hours. , Disp: 1 Inhaler, Rfl: 12    albuterol (VENTOLIN HFA) 90 mcg/actuation inhaler, Take 2 Puffs by inhalation every four (4) hours as needed for Wheezing., Disp: 1 Inhaler, Rfl: 11    omega-3 acid ethyl esters (LOVAZA) 1 gram capsule, Take 2 Caps by mouth two (2) times a day., Disp: 120 Cap, Rfl: 5    dapagliflozin (FARXIGA) 10 mg tab tablet, Take 1 Tab by mouth daily. , Disp: 90 Tab, Rfl: 1    SITagliptin (JANUVIA) 100 mg tablet, Take 1 Tab by mouth daily. , Disp: 90 Tab, Rfl: 1    meloxicam (MOBIC) 7.5 mg tablet, Take 1 Tab by mouth daily. , Disp: 30 Tab, Rfl: 2    amLODIPine (NORVASC) 5 mg tablet, Take 1 Tab by mouth daily. , Disp: 30 Tab, Rfl: 1    levalbuterol (XOPENEX) 1.25 mg/3 mL nebu, 3 mL by Nebulization route every four (4) hours as needed. , Disp: 30 Nebule, Rfl: 11    meclizine (ANTIVERT) 25 mg tablet, Take 1 Tab by mouth three (3) times daily as needed. , Disp: 30 Tab, Rfl: 2    ondansetron (ZOFRAN ODT) 4 mg disintegrating tablet, Take 1 tablet by mouth every eight (8) hours as needed. , Disp: 20 tablet, Rfl: 1    promethazine (PHENERGAN) 25 mg tablet, Take 1 tablet by mouth every six (6) hours as needed. , Disp: 12 tablet, Rfl: 0   Date Last Reviewed:  4/4/2018   # of Personal Factors/Comorbidities that affect the Plan of Care: 1-2: MODERATE COMPLEXITY   EXAMINATION:   Initial assessment on 4/4/2018  Observation/Orthostatic Postural Assessment:          Kyphotic posture, forward head, rounded shoulders, posterior pelvic tilt in sitting, R knee extended in sitting  Palpation:          Significant pain with R patellar mobilizations (grade 2-3) medially/laterally and inferiorly/superiorly, significant pain with overpressure in R knee extension  ROM:    Initial measurement: (on 4/4/2018) Initial measurement: (on 4/4/2018) Reassessment measurement:  Reassessment measurement:    L LE: WFL throughout hip, knee and ankle (SLR to 90) R LE: WFL throughout, but significant pain throughout knee flexion, especially at end range, eased off slightly with movement into knee extension (but pain with overpressure into knee extension)         Strength:    Initial measurement: (on 4/4/2018) Initial measurement: (on 4/4/2018) Reassessment measurement:  Reassessment measurement:    L LE:  Hip flexion: 4+/5  Hip abduction: 4/5 (pain in R knee)  Hip adduction: 3+/5  Hip IR: 4+/5  Hip ER: 4/5  Knee extension: 4+/5 R LE:  Hip flexion: 4-/5 (pain in anterior R knee)  Hip abduction: 4-/5 (pain in anterior R knee along joint line)  Hip adduction: 3+/5 (pain in anterior R knee along joint line)  Hip IR: 4/5 (pain in anterior R knee along joint line)  Hip ER: 4-/5 (pain in anterior R knee along joint line)  Knee extension: 4-/5 (pain in anterior R knee along joint line)       Special Tests:          Valgus/varus stress test: - at 0 and 30 degrees  McConnel's test for patellar tracking: -  Did not attempt Pranav's test for meniscus due to pt's high guarding and resultant pain with any knee movement  Neurological Screen:        Myotomes:  WFL        Dermatomes:  Pt states she has peripheral neuropathy  Functional Mobility:         Gait/Ambulation:  Pt ambulates with no AD with following gait deficits: decreased R stance time, decreased B step length/clearance, increased lateral sway        Transfers:  [place]; uses hands to push up from/reach back for chair/mat; maintains R knee extension throughout transfer        Bed Mobility:  [place]; increased time  Balance:          Fair to good (increased lateral sway during ambulation)   Body Structures Involved:  1. Nerves  2. Bones  3. Joints  4. Muscles  5. Ligaments Body Functions Affected:  1. Sensory/Pain  2. Neuromusculoskeletal  3. Movement Related Activities and Participation Affected:  1. General Tasks and Demands  2. Mobility  3. Self Care  4. Domestic Life  5. Interpersonal Interactions and Relationships  6. Community, Social and [place]   # of elements that affect the Plan of Care: 4+: HIGH COMPLEXITY   CLINICAL PRESENTATION:   Presentation: Evolving clinical presentation with changing clinical characteristics: MODERATE COMPLEXITY   CLINICAL DECISION MAKING:   Outcome Measure: Tool Used: Lower Extremity Functional Scale (LEFS)  Score:  Initial: 6/80 Most Recent: X/80 (Date: -- )   Interpretation of Score: 20 questions each scored on a 5 point scale with 0 representing \"extreme difficulty or unable to perform\" and 4 representing \"no difficulty\". The lower the score, the greater the functional disability. 80/80 represents no disability. Minimal detectable change is 9 points.   Score 80 79-63 62-48 47-32 31-16 15-1 0   Modifier CH CI CJ CK CL CM CN     Tool Used: Timed Up and Go (TUG)  Score:  Initial: 13 seconds no AD Most Recent: X seconds (Date: -- )   Interpretation of Score: The test measures, in seconds, the time taken by an individual to stand up from a standard arm chair (seat height 46 cm [18 in], arm height 65 cm [25.6 in]), walk a distance of 3 meters (118 in, approx 10 ft), turn, walk back to the chair and sit down. If the individual takes longer than 14 seconds to complete TUG, this indicates risk for falls. Score 7 7.5-10.5 11-14 14.5-17.5 18-21 21.5-24.5 25+   Modifier CH CI CJ CK CL CM CN     Payor: KEV / Plan: [id number] / Product Type: PPO /   Medical Necessity:   · Patient is expected to demonstrate progress in strength, range of motion, balance and functional technique to improve ability to perform pain-free standing/ambulation and to improve overall quality of life. · Patient demonstrates good rehab potential due to higher previous functional level. Reason for Services/Other Comments:  · Patient continues to require modification of therapeutic interventions to increase complexity of exercises. Use of outcome tool(s) and clinical judgement create a POC that gives a: Questionable prediction of patient's progress: MODERATE COMPLEXITY   TREATMENT:   (In addition to Assessment/Re-Assessment sessions the following treatments were rendered)  Subjective comments at beginning of session: See history above. MANUAL THERAPY: (10 minutes): Joint mobilization, Soft tissue mobilization and Manual lymphatic drainage was utilized and necessary because of the patient's restricted joint motion, painful spasm and restricted motion of soft tissue. With pt in supported supine position with B LEs elevated on wedge, gentle STM and retrograde edema massage performed to R lower leg and musculature surrounding R knee to reduce edema and improve knee mobility. Modalities (10 minutes):  With pt in supported supine position with B LEs elevated on wedge, cold pack (pillowcase layer) applied to R knee at end of session to reduce inflammation and pain. Treatment/Session Assessment:  See assessment above. Encouraged pt to ice knee regularly at home. · Pain/ Symptoms: Initial:   8/10 Post Session:  2/10 ·   Compliance with Program/Exercises: Will assess as treatment progresses. · Recommendations/Intent for next treatment session: \"Next visit will focus on advancements to more challenging activities and reduction in assistance provided\".  Continue to work on manual therapy/modalities as needed to reduce pain; gait training with cane if needed; gentle strengthening exercises; postural exercises (to encourage pt to avoid hyperextension at knees)  Total Treatment Duration:  PT Patient Time In/Time Out  Time In: 1030  Time Out: 1206 Steve Melendez, T

## 2018-04-06 ENCOUNTER — HOSPITAL ENCOUNTER (OUTPATIENT)
Dept: PHYSICAL THERAPY | Age: 58
Discharge: HOME | End: 2018-04-06
Attending: FAMILY MEDICINE
Payer: COMMERCIAL

## 2018-04-06 NOTE — PROGRESS NOTES
Therapy Center at [address], [facility]  [phone]   [phone]     OUTPATIENT DAILY NOTE     DATE: 4/6/2018    SUBJECTIVE:  Patient called and cancelled due to working 3rd shift and getting off late. Will plan to follow up on next scheduled visit.     David Perez, PTA

## 2018-04-09 ENCOUNTER — HOSPITAL ENCOUNTER (OUTPATIENT)
Dept: PHYSICAL THERAPY | Age: 58
Discharge: HOME | End: 2018-04-09
Attending: FAMILY MEDICINE
Payer: COMMERCIAL

## 2018-04-09 PROCEDURE — 97140 MANUAL THERAPY 1/> REGIONS: CPT

## 2018-04-09 PROCEDURE — 97110 THERAPEUTIC EXERCISES: CPT

## 2018-04-09 NOTE — PROGRESS NOTES
Adithya Huffman  : 1960  Primary: [address] [facility], [address], [place], [address] [facility]  [phone]   [phone]       OUTPATIENT PHYSICAL THERAPY:Daily Note 2018    ICD-10: Treatment Diagnosis: Difficulty in walking, not elsewhere classified (R26.2)   Unsteadiness on feet (R26.81)   Pain in right knee (M25.561)       Precautions/Allergies:   Codeine and Meperidine   Fall Risk Score: 6 (? 5 = High Risk)  MD Orders: Evaluate and treat MEDICAL/REFERRING DIAGNOSIS:  Acute pain of right knee [M25.561]   DATE OF ONSET: 2017  REFERRING PHYSICIAN: Elian Borges,*  RETURN PHYSICIAN APPOINTMENT: unspecified   This section established at most recent assessment  INITIAL ASSESSMENT:  Adithya Huffman is a 59 y.o. female who presents to physical therapy for insidious onset of R knee pain beginning in 2017. This session, she demonstrated decreased R LE strength/endurance, significantly increased pain with all R knee mobility, multiple postural/gait deficits, and decreased functional mobility as evident by a score of 6/80 on the Lower Extremity Functional Scale (with lower scores indicating increased disability). Pt may benefit from skilled PT to address the above listed deficits to improve ability to perform pain-free ADLs/IADLs and to improve overall quality of life prior to discharge. PROBLEM LIST (Impacting functional limitations):  1. Decreased Strength  2. Decreased ADL/Functional Activities  3. Decreased Transfer Abilities  4. Decreased Ambulation Ability/Technique  5. Decreased Balance  6. Increased Pain  7. Decreased Activity Tolerance  8. Decreased Flexibility/Joint Mobility  9. Edema/Girth INTERVENTIONS PLANNED:  1. Balance Exercise  2. Bed Mobility  3. Cold  4. Family Education  5. Gait Training  6. Heat  7. Home Exercise Program (HEP)  8. Manual Therapy  9.  Neuromuscular Re-education/Strengthening  10. Range of Motion (ROM)  11. Therapeutic Activites  12. Therapeutic Exercise/Strengthening  13. Transfer Training  14. Ultrasound (US)   TREATMENT PLAN:  Effective Dates: 4/4/2018 TO 7/3/2018 (90 days). Frequency/Duration: 2 times a week for 90 Days  GOALS: (Goals have been discussed and agreed upon with patient.)  Short-Term Functional Goals: Time Frame: 45 days  1. Pt will be compliant with HEP in order to increase LE strength/endurance/mobility to improve functional mobility and overall quality of life. 2. Pt will improve score on the Lower Extremity Functional Scale to 25/80 in order to demonstrate improved functional mobility and quality of life. 3. Pt will improve score on Timed Up and Go Test to 11 seconds with the least restrictive assistive device in order to decrease fall risk and increase safety and independence during ambulation. 4. Pt will be able to ascend/descend 4 steps with S or without rail with reciprocal or step to gait pattern in order to improve community mobility. Discharge Goals: Time Frame: 90 days  1. Pt will be independent with HEP in order to increase LE strength/endurance/mobility to improve functional mobility and overall quality of life. 2. Pt will improve score on the Lower Extremity Functional Scale to 45/80 in order to demonstrate improved functional mobility and quality of life. 3. Pt will improve score on Timed Up and Go Test to 9 seconds with the least restrictive assistive device in order to decrease fall risk and increase safety and independence during ambulation. 4. Pt will be able to ascend/descend 6 steps independently without rail with reciprocal or step to gait pattern in order to improve community mobility. Rehabilitation Potential For Stated Goals: Good  Regarding Scott romero, I certify that the treatment plan above will be carried out by a therapist or under their direction.   Thank you for this referral,  Mary Carmen Saldaña DPT     Referring Physician Signature: Talonjanelle Rivera.,*              Date                    HISTORY:   History of Present Injury/Illness (Reason for Referral): *History per pt or pt's family except where otherwise noted  Pt states that her knee has been hurting since about 2017 (states she woke up with it hurting, not sure what caused it to start). States she usually sleeps in recliner with her legs elevated (states this is due to asthma), and when she was sleeping on her side one night, she heard her R knee \"pop,\" and it has been hurting ever since. She states there is an intense burning pain in her R anterior knee surrounding the patella along the joint line (does not radiate per pt report). She states some days the pain is worse than others. She states it feels like it is going to give out (and it has given out on her sometimes), she has difficulty walking immediately after standing, and she has significant difficulty going up/down steps. Pain at worst is 8/10 (aggravating activities include straightening her R knee after having it flexed for a long time, walking/standing > 15 minutes, trying to get up from stooped position). Pain at best is 5/10 (eases pain by trying to keep her R leg elevated and extended, avoiding weightbearing activities, IcyHot, wearing copper sleeve). During the past month, she has fallen twice due to her R leg giving out (states that she has also passed out before due to low potassium levels). Pt states she does not smoke. Past Medical History/Comorbidities:   Ms. Seabron Boast  has a past medical history of Anxiety; Arrhythmia; Asthma (2010); Depression; Diabetes mellitus type 2, controlled, without complications ([address] Dyslipidemia (2010); Hypertension; Migraines; SVT (supraventricular tachycardia) ([address] and Vitamin D deficiency.   Ms. Seabron Boast  has a past surgical history that includes hx  section (); pr abdomen surgery proc unlisted; hx tonsillectomy (as child); hx tubal ligation (Bilateral); hx kash and bso (2001); and hx lap cholecystectomy (2013). 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 Current Medications:    Current Outpatient Prescriptions:     losartan-hydroCHLOROthiazide (HYZAAR) 100-25 mg per tablet, Take 1 Tab by mouth daily. , Disp: 90 Tab, Rfl: 1    atorvastatin (LIPITOR) 20 mg tablet, Take 1 Tab by mouth daily. , Disp: 90 Tab, Rfl: 1    potassium chloride SA (MICRO-K) 10 mEq capsule, Take 1 Cap by mouth two (2) times a day., Disp: 180 Cap, Rfl: 1    fluticasone-salmeterol (ADVAIR DISKUS) 250-50 mcg/dose diskus inhaler, Take 1 Puff by inhalation every twelve (12) hours. , Disp: 1 Inhaler, Rfl: 12    albuterol (VENTOLIN HFA) 90 mcg/actuation inhaler, Take 2 Puffs by inhalation every four (4) hours as needed for Wheezing., Disp: 1 Inhaler, Rfl: 11    omega-3 acid ethyl esters (LOVAZA) 1 gram capsule, Take 2 Caps by mouth two (2) times a day., Disp: 120 Cap, Rfl: 5    dapagliflozin (FARXIGA) 10 mg tab tablet, Take 1 Tab by mouth daily. , Disp: 90 Tab, Rfl: 1    SITagliptin (JANUVIA) 100 mg tablet, Take 1 Tab by mouth daily. , Disp: 90 Tab, Rfl: 1    meloxicam (MOBIC) 7.5 mg tablet, Take 1 Tab by mouth daily. , Disp: 30 Tab, Rfl: 2    amLODIPine (NORVASC) 5 mg tablet, Take 1 Tab by mouth daily. , Disp: 30 Tab, Rfl: 1    levalbuterol (XOPENEX) 1.25 mg/3 mL nebu, 3 mL by Nebulization route every four (4) hours as needed. , Disp: 30 Nebule, Rfl: 11    meclizine (ANTIVERT) 25 mg tablet, Take 1 Tab by mouth three (3) times daily as needed. , Disp: 30 Tab, Rfl: 2    ondansetron (ZOFRAN ODT) 4 mg disintegrating tablet, Take 1 tablet by mouth every eight (8) hours as needed. , Disp: 20 tablet, Rfl: 1    promethazine (PHENERGAN) 25 mg tablet, Take 1 tablet by mouth every six (6) hours as needed. , Disp: 12 tablet, Rfl: 0   Date Last Reviewed:  4/9/2018   # of Personal Factors/Comorbidities that affect the Plan of Care: 1-2: MODERATE COMPLEXITY   EXAMINATION:   Initial assessment on 4/4/2018  Observation/Orthostatic Postural Assessment:          Kyphotic posture, forward head, rounded shoulders, posterior pelvic tilt in sitting, R knee extended in sitting  Palpation:          Significant pain with R patellar mobilizations (grade 2-3) medially/laterally and inferiorly/superiorly, significant pain with overpressure in R knee extension  ROM:    Initial measurement: (on 4/4/2018) Initial measurement: (on 4/4/2018) Reassessment measurement:  Reassessment measurement:    L LE: WFL throughout hip, knee and ankle (SLR to 90) R LE: WFL throughout, but significant pain throughout knee flexion, especially at end range, eased off slightly with movement into knee extension (but pain with overpressure into knee extension)         Strength:    Initial measurement: (on 4/4/2018) Initial measurement: (on 4/4/2018) Reassessment measurement:  Reassessment measurement:    L LE:  Hip flexion: 4+/5  Hip abduction: 4/5 (pain in R knee)  Hip adduction: 3+/5  Hip IR: 4+/5  Hip ER: 4/5  Knee extension: 4+/5 R LE:  Hip flexion: 4-/5 (pain in anterior R knee)  Hip abduction: 4-/5 (pain in anterior R knee along joint line)  Hip adduction: 3+/5 (pain in anterior R knee along joint line)  Hip IR: 4/5 (pain in anterior R knee along joint line)  Hip ER: 4-/5 (pain in anterior R knee along joint line)  Knee extension: 4-/5 (pain in anterior R knee along joint line)       Special Tests:          Valgus/varus stress test: - at 0 and 30 degrees  McConnel's test for patellar tracking: -  Did not attempt Pranav's test for meniscus due to pt's high guarding and resultant pain with any knee movement  Neurological Screen:        Myotomes:  WFL        Dermatomes:  Pt states she has peripheral neuropathy  Functional Mobility:         Gait/Ambulation:  Pt ambulates with no AD with following gait deficits: decreased R stance time, decreased B step length/clearance, increased lateral sway        Transfers:  [place]; uses hands to push up from/reach back for chair/mat; maintains R knee extension throughout transfer        Bed Mobility:  [place]; increased time  Balance:          Fair to good (increased lateral sway during ambulation)   Body Structures Involved:  1. Nerves  2. Bones  3. Joints  4. Muscles  5. Ligaments Body Functions Affected:  1. Sensory/Pain  2. Neuromusculoskeletal  3. Movement Related Activities and Participation Affected:  1. General Tasks and Demands  2. Mobility  3. Self Care  4. Domestic Life  5. Interpersonal Interactions and Relationships  6. Community, Social and [place]   # of elements that affect the Plan of Care: 4+: HIGH COMPLEXITY   CLINICAL PRESENTATION:   Presentation: Evolving clinical presentation with changing clinical characteristics: MODERATE COMPLEXITY   CLINICAL DECISION MAKING:   Outcome Measure: Tool Used: Lower Extremity Functional Scale (LEFS)  Score:  Initial: 6/80 Most Recent: X/80 (Date: -- )   Interpretation of Score: 20 questions each scored on a 5 point scale with 0 representing \"extreme difficulty or unable to perform\" and 4 representing \"no difficulty\". The lower the score, the greater the functional disability. 80/80 represents no disability. Minimal detectable change is 9 points.   Score 80 79-63 62-48 47-32 31-16 15-1 0   Modifier CH CI CJ CK CL CM CN     Tool Used: Timed Up and Go (TUG)  Score:  Initial: 13 seconds no AD Most Recent: X seconds (Date: -- )   Interpretation of Score: The test measures, in seconds, the time taken by an individual to stand up from a standard arm chair (seat height 46 cm [18 in], arm height 65 cm [25.6 in]), walk a distance of 3 meters (118 in, approx 10 ft), turn, walk back to the chair and sit down. If the individual takes longer than 14 seconds to complete TUG, this indicates risk for falls. Score 7 7.5-10.5 11-14 14.5-17.5 18-21 21.5-24.5 25+   Modifier CH CI CJ CK CL CM CN     Payor: [employer] / Plan: [address] [place] [address] / Product Type: PPO /   Medical Necessity:   · Patient is expected to demonstrate progress in strength, range of motion, balance and functional technique to improve ability to perform pain-free standing/ambulation and to improve overall quality of life. · Patient demonstrates good rehab potential due to higher previous functional level. Reason for Services/Other Comments:  · Patient continues to require modification of therapeutic interventions to increase complexity of exercises. Use of outcome tool(s) and clinical judgement create a POC that gives a: Questionable prediction of patient's progress: MODERATE COMPLEXITY   TREATMENT:   (In addition to Assessment/Re-Assessment sessions the following treatments were rendered)  Subjective comments at beginning of session: Pt states she felt better after last session but pain increased again after going to work. Therapeutic Exercise: ( 8 minutes):  Exercises per grid below to improve mobility, strength, balance and dynamic movement of knee - right to improve functional mobility. Required minimal visual, verbal and manual cues to promote proper body alignment, promote proper body posture and promote proper body mechanics. Progressed resistance, range and repetitions as indicated.     Date:  4/9/2018 Date:   Date:   Activity/Exercise Parameters Parameters    Nustep L1 resistance for 6 minutes with B LEs and UEs to increase blood flow and knee mobility                                         *given in HEP    MANUAL THERAPY: (35 minutes): Joint mobilization, Soft tissue mobilization and Manual lymphatic drainage was utilized and necessary because of the patient's restricted joint motion, painful spasm and restricted motion of soft tissue. With pt in supported supine position with B LEs elevated on wedge, extensive STM and retrograde edema massage performed with and without theraflex roller to R ankle dorsiflexors, quads, hip abductors/adductors, and hamstrings/plantarflexors to reduce edema and improve knee mobility. Modalities (10 minutes): With pt in supported supine position with B LEs elevated on wedge, cold pack (pillowcase layer) applied to R knee at end of session to reduce inflammation and pain. Treatment/Session Assessment:  Pt reporting increased pain in R knee after Nustep, but significant reduction in pain following manual therapy. Her signs and symptoms appear to be consistent with meniscal tear of her R knee. · Pain/ Symptoms: Initial:   8/10 Post Session:  6/10 ·   Compliance with Program/Exercises: Will assess as treatment progresses. · Recommendations/Intent for next treatment session: \"Next visit will focus on advancements to more challenging activities and reduction in assistance provided\".  Continue to work on manual therapy/modalities as needed to reduce pain; gait training with cane if needed; gentle strengthening exercises; postural exercises (to encourage pt to avoid hyperextension at knees)  Total Treatment Duration:  PT Patient Time In/Time Out  Time In: 0920  Time Out: [id number] [address], DPT

## 2018-04-16 ENCOUNTER — HOSPITAL ENCOUNTER (OUTPATIENT)
Dept: PHYSICAL THERAPY | Age: 58
Discharge: HOME | End: 2018-04-16
Attending: FAMILY MEDICINE
Payer: COMMERCIAL

## 2018-04-16 PROCEDURE — 97140 MANUAL THERAPY 1/> REGIONS: CPT

## 2018-04-16 PROCEDURE — 97110 THERAPEUTIC EXERCISES: CPT

## 2018-04-16 NOTE — PROGRESS NOTES
Bharat Hicks  : 1960  Primary: [facility]  [address], [address], [place], [address] [facility]  [phone]   [phone]       OUTPATIENT PHYSICAL THERAPY:Daily Note 2018    ICD-10: Treatment Diagnosis: Difficulty in walking, not elsewhere classified (R26.2)   Unsteadiness on feet (R26.81)   Pain in right knee (M25.561)       Precautions/Allergies:   Codeine and Meperidine   Fall Risk Score: 6 (? 5 = High Risk)  MD Orders: Evaluate and treat MEDICAL/REFERRING DIAGNOSIS:  Acute pain of right knee [M25.561]   DATE OF ONSET: 2017  REFERRING PHYSICIAN: Braulio Delcid.,*  RETURN PHYSICIAN APPOINTMENT: unspecified   This section established at most recent assessment  INITIAL ASSESSMENT:  Bharat Hicks is a 59 y.o. female who presents to physical therapy for insidious onset of R knee pain beginning in 2017. This session, she demonstrated decreased R LE strength/endurance, significantly increased pain with all R knee mobility, multiple postural/gait deficits, and decreased functional mobility as evident by a score of 6/80 on the Lower Extremity Functional Scale (with lower scores indicating increased disability). Pt may benefit from skilled PT to address the above listed deficits to improve ability to perform pain-free ADLs/IADLs and to improve overall quality of life prior to discharge. PROBLEM LIST (Impacting functional limitations):  1. Decreased Strength  2. Decreased ADL/Functional Activities  3. Decreased Transfer Abilities  4. Decreased Ambulation Ability/Technique  5. Decreased Balance  6. Increased Pain  7. Decreased Activity Tolerance  8. Decreased Flexibility/Joint Mobility  9. Edema/Girth INTERVENTIONS PLANNED:  1. Balance Exercise  2. Bed Mobility  3. Cold  4. Family Education  5. Gait Training  6. Heat  7. Home Exercise Program (HEP)  8. Manual Therapy  9.  Neuromuscular Re-education/Strengthening  10. Range of Motion (ROM)  11. Therapeutic Activites  12. Therapeutic Exercise/Strengthening  13. Transfer Training  14. Ultrasound (US)   TREATMENT PLAN:  Effective Dates: 4/4/2018 TO 7/3/2018 (90 days). Frequency/Duration: 2 times a week for 90 Days  GOALS: (Goals have been discussed and agreed upon with patient.)  Short-Term Functional Goals: Time Frame: 45 days  1. Pt will be compliant with HEP in order to increase LE strength/endurance/mobility to improve functional mobility and overall quality of life. 2. Pt will improve score on the Lower Extremity Functional Scale to 25/80 in order to demonstrate improved functional mobility and quality of life. 3. Pt will improve score on Timed Up and Go Test to 11 seconds with the least restrictive assistive device in order to decrease fall risk and increase safety and independence during ambulation. 4. Pt will be able to ascend/descend 4 steps with S or without rail with reciprocal or step to gait pattern in order to improve community mobility. Discharge Goals: Time Frame: 90 days  1. Pt will be independent with HEP in order to increase LE strength/endurance/mobility to improve functional mobility and overall quality of life. 2. Pt will improve score on the Lower Extremity Functional Scale to 45/80 in order to demonstrate improved functional mobility and quality of life. 3. Pt will improve score on Timed Up and Go Test to 9 seconds with the least restrictive assistive device in order to decrease fall risk and increase safety and independence during ambulation. 4. Pt will be able to ascend/descend 6 steps independently without rail with reciprocal or step to gait pattern in order to improve community mobility. Rehabilitation Potential For Stated Goals: Good  Regarding Kalani Dorman therapy, I certify that the treatment plan above will be carried out by a therapist or under their direction.   Thank you for this referral,  Jg Sarmiento PTA     Referring Physician Signature: Sargent Mercury.,*              Date                    HISTORY:   History of Present Injury/Illness (Reason for Referral): *History per pt or pt's family except where otherwise noted  Pt states that her knee has been hurting since about 2017 (states she woke up with it hurting, not sure what caused it to start). States she usually sleeps in recliner with her legs elevated (states this is due to asthma), and when she was sleeping on her side one night, she heard her R knee \"pop,\" and it has been hurting ever since. She states there is an intense burning pain in her R anterior knee surrounding the patella along the joint line (does not radiate per pt report). She states some days the pain is worse than others. She states it feels like it is going to give out (and it has given out on her sometimes), she has difficulty walking immediately after standing, and she has significant difficulty going up/down steps. Pain at worst is 8/10 (aggravating activities include straightening her R knee after having it flexed for a long time, walking/standing > 15 minutes, trying to get up from stooped position). Pain at best is 5/10 (eases pain by trying to keep her R leg elevated and extended, avoiding weightbearing activities, Icy[employer], wearing copper sleeve). During the past month, she has fallen twice due to her R leg giving out (states that she has also passed out before due to low potassium levels). Pt states she does not smoke. Past Medical History/Comorbidities:   Ms. Marshall Mustafa  has a past medical history of Anxiety; Arrhythmia; Asthma (2010); Depression; Diabetes mellitus type 2, controlled, without complications ([address] Dyslipidemia (2010); Hypertension; Migraines; SVT (supraventricular tachycardia) ([address] and Vitamin D deficiency.   Ms. Marshall Mustafa  has a past surgical history that includes hx  section (); pr abdomen surgery proc unlisted; hx tonsillectomy (as child); hx tubal ligation (Bilateral); hx kash and bso (2001); and hx lap cholecystectomy (2013). 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 Current Medications:    Current Outpatient Prescriptions:     losartan-hydroCHLOROthiazide (HYZAAR) 100-25 mg per tablet, Take 1 Tab by mouth daily. , Disp: 90 Tab, Rfl: 1    atorvastatin (LIPITOR) 20 mg tablet, Take 1 Tab by mouth daily. , Disp: 90 Tab, Rfl: 1    potassium chloride SA (MICRO-K) 10 mEq capsule, Take 1 Cap by mouth two (2) times a day., Disp: 180 Cap, Rfl: 1    fluticasone-salmeterol (ADVAIR DISKUS) 250-50 mcg/dose diskus inhaler, Take 1 Puff by inhalation every twelve (12) hours. , Disp: 1 Inhaler, Rfl: 12    albuterol (VENTOLIN HFA) 90 mcg/actuation inhaler, Take 2 Puffs by inhalation every four (4) hours as needed for Wheezing., Disp: 1 Inhaler, Rfl: 11    omega-3 acid ethyl esters (LOVAZA) 1 gram capsule, Take 2 Caps by mouth two (2) times a day., Disp: 120 Cap, Rfl: 5    dapagliflozin (FARXIGA) 10 mg tab tablet, Take 1 Tab by mouth daily. , Disp: 90 Tab, Rfl: 1    SITagliptin (JANUVIA) 100 mg tablet, Take 1 Tab by mouth daily. , Disp: 90 Tab, Rfl: 1    meloxicam (MOBIC) 7.5 mg tablet, Take 1 Tab by mouth daily. , Disp: 30 Tab, Rfl: 2    amLODIPine (NORVASC) 5 mg tablet, Take 1 Tab by mouth daily. , Disp: 30 Tab, Rfl: 1    levalbuterol (XOPENEX) 1.25 mg/3 mL nebu, 3 mL by Nebulization route every four (4) hours as needed. , Disp: 30 Nebule, Rfl: 11    meclizine (ANTIVERT) 25 mg tablet, Take 1 Tab by mouth three (3) times daily as needed. , Disp: 30 Tab, Rfl: 2    ondansetron (ZOFRAN ODT) 4 mg disintegrating tablet, Take 1 tablet by mouth every eight (8) hours as needed. , Disp: 20 tablet, Rfl: 1    promethazine (PHENERGAN) 25 mg tablet, Take 1 tablet by mouth every six (6) hours as needed. , Disp: 12 tablet, Rfl: 0   Date Last Reviewed:  4/16/2018   # of Personal Factors/Comorbidities that affect the Plan of Care: 1-2: MODERATE COMPLEXITY   EXAMINATION:   Initial assessment on 4/4/2018  Observation/Orthostatic Postural Assessment:          Kyphotic posture, forward head, rounded shoulders, posterior pelvic tilt in sitting, R knee extended in sitting  Palpation:          Significant pain with R patellar mobilizations (grade 2-3) medially/laterally and inferiorly/superiorly, significant pain with overpressure in R knee extension  ROM:    Initial measurement: (on 4/4/2018) Initial measurement: (on 4/4/2018) Reassessment measurement:  Reassessment measurement:    L LE: WFL throughout hip, knee and ankle (SLR to 90) R LE: WFL throughout, but significant pain throughout knee flexion, especially at end range, eased off slightly with movement into knee extension (but pain with overpressure into knee extension)         Strength:    Initial measurement: (on 4/4/2018) Initial measurement: (on 4/4/2018) Reassessment measurement:  Reassessment measurement:    L LE:  Hip flexion: 4+/5  Hip abduction: 4/5 (pain in R knee)  Hip adduction: 3+/5  Hip IR: 4+/5  Hip ER: 4/5  Knee extension: 4+/5 R LE:  Hip flexion: 4-/5 (pain in anterior R knee)  Hip abduction: 4-/5 (pain in anterior R knee along joint line)  Hip adduction: 3+/5 (pain in anterior R knee along joint line)  Hip IR: 4/5 (pain in anterior R knee along joint line)  Hip ER: 4-/5 (pain in anterior R knee along joint line)  Knee extension: 4-/5 (pain in anterior R knee along joint line)       Special Tests:          Valgus/varus stress test: - at 0 and 30 degrees  McConnel's test for patellar tracking: -  Did not attempt Pranav's test for meniscus due to pt's high guarding and resultant pain with any knee movement  Neurological Screen:        Myotomes:  WFL        Dermatomes:  Pt states she has peripheral neuropathy  Functional Mobility:         Gait/Ambulation:  Pt ambulates with no AD with following gait deficits: decreased R stance time, decreased B step length/clearance, increased lateral sway        Transfers:  [place]; uses hands to push up from/reach back for chair/mat; maintains R knee extension throughout transfer        Bed Mobility:  [place]; increased time  Balance:          Fair to good (increased lateral sway during ambulation)   Body Structures Involved:  1. Nerves  2. Bones  3. Joints  4. Muscles  5. Ligaments Body Functions Affected:  1. Sensory/Pain  2. Neuromusculoskeletal  3. Movement Related Activities and Participation Affected:  1. General Tasks and Demands  2. Mobility  3. Self Care  4. Domestic Life  5. Interpersonal Interactions and Relationships  6. Community, Social and [place]   # of elements that affect the Plan of Care: 4+: HIGH COMPLEXITY   CLINICAL PRESENTATION:   Presentation: Evolving clinical presentation with changing clinical characteristics: MODERATE COMPLEXITY   CLINICAL DECISION MAKING:   Outcome Measure: Tool Used: Lower Extremity Functional Scale (LEFS)  Score:  Initial: 6/80 Most Recent: X/80 (Date: -- )   Interpretation of Score: 20 questions each scored on a 5 point scale with 0 representing \"extreme difficulty or unable to perform\" and 4 representing \"no difficulty\". The lower the score, the greater the functional disability. 80/80 represents no disability. Minimal detectable change is 9 points.   Score 80 79-63 62-48 47-32 31-16 15-1 0   Modifier CH CI CJ CK CL CM CN     Tool Used: Timed Up and Go (TUG)  Score:  Initial: 13 seconds no AD Most Recent: X seconds (Date: -- )   Interpretation of Score: The test measures, in seconds, the time taken by an individual to stand up from a standard arm chair (seat height 46 cm [18 in], arm height 65 cm [25.6 in]), walk a distance of 3 meters (118 in, approx 10 ft), turn, walk back to the chair and sit down. If the individual takes longer than 14 seconds to complete TUG, this indicates risk for falls. Score 7 7.5-10.5 11-14 14.5-17.5 18-21 21.5-24.5 25+   Modifier CH CI CJ CK CL CM CN     Payor: [employer] / Plan: [address] [place] [address] / Product Type: PPO /   Medical Necessity:   · Patient is expected to demonstrate progress in strength, range of motion, balance and functional technique to improve ability to perform pain-free standing/ambulation and to improve overall quality of life. · Patient demonstrates good rehab potential due to higher previous functional level. Reason for Services/Other Comments:  · Patient continues to require modification of therapeutic interventions to increase complexity of exercises. Use of outcome tool(s) and clinical judgement create a POC that gives a: Questionable prediction of patient's progress: MODERATE COMPLEXITY   TREATMENT:   (In addition to Assessment/Re-Assessment sessions the following treatments were rendered)  Subjective comments at beginning of session: Pt reports that her knee pain is bad today and pain continues to be just below knee cap across the knee. Patient states it is a rather constant pain and also, she has worked for 6 days straight on 12 plus hour shifts and has to get up and down a lot during her shift and this increases her pain. Patient reports she uses ice at home for relief. Therapeutic Exercise: ( 25 minutes):  Exercises per grid below to improve mobility, strength, balance and dynamic movement of knee - right to improve functional mobility.   Required minimal visual, verbal and manual cues to promote proper body alignment, promote proper body posture and promote proper body mechanics. Progressed resistance, range and repetitions as indicated. Date:  4/9/2018 Date:  4/16/18 Date:   Activity/Exercise Parameters Parameters    Nustep L1 resistance for 6 minutes with B LEs and UEs to increase blood flow and knee mobility L1 resistance for 10 minutes with B LEs and UEs to increase blood flow and knee mobility    Hamstring stretch  3 x 30 second hold right LE      Side lying TFL stretch  3 x 30 second hold right LE  In comfortable range    Prone quad stretch  3 x 30 second hold right LE in comfortable range      Piriformis stretch  3 x 30 second hold right LE in comfortable range                  *given in HEP    MANUAL THERAPY: (15 minutes): Joint mobilization, Soft tissue mobilization and Manual lymphatic drainage was utilized and necessary because of the patient's restricted joint motion, painful spasm and restricted motion of soft tissue. With pt in supported supine position with B LEs elevated on wedge,STM and retrograde edema massage performed with and without theraflex roller to, quads, TFL and hamstring     Modalities (10 minutes): With pt supine and small roll under right knee ice massage to patella tendon area to help decrease inflammation and pain. Treatment/Session Assessment:  Pt reporting right knee loosened up after being on bike for 6 minutes but in the beginning knee felt painful, Patient tolerated treatment well with reports of pain relieved following ice massage today. · Pain/ Symptoms: Initial:   7/10 Post Session:  0/10 ·   Compliance with Program/Exercises: Will assess as treatment progresses. · Recommendations/Intent for next treatment session: \"Next visit will focus on advancements to more challenging activities and reduction in assistance provided\".  Continue to work on manual therapy/modalities as needed to reduce pain; gait training with cane if needed; gentle strengthening exercises; postural exercises (to encourage pt to avoid hyperextension at knees)  Total Treatment Duration:  PT Patient Time In/Time Out  Time In: 0930  Time Out: [address], [place]              g

## 2018-04-18 ENCOUNTER — HOSPITAL ENCOUNTER (OUTPATIENT)
Dept: PHYSICAL THERAPY | Age: 58
Discharge: HOME | End: 2018-04-18
Attending: FAMILY MEDICINE
Payer: COMMERCIAL

## 2018-04-18 ENCOUNTER — PATIENT OUTREACH (OUTPATIENT)
Dept: OTHER | Age: 58
End: 2018-04-18

## 2018-04-18 PROCEDURE — 97110 THERAPEUTIC EXERCISES: CPT

## 2018-04-18 PROCEDURE — 97140 MANUAL THERAPY 1/> REGIONS: CPT

## 2018-04-18 NOTE — PROGRESS NOTES
Jony Mansfield  : 1960  Primary: [facility], [address], [place], [address] [facility]  [phone]   [phone]       OUTPATIENT PHYSICAL THERAPY:Daily Note 2018    ICD-10: Treatment Diagnosis: Difficulty in walking, not elsewhere classified (R26.2)   Unsteadiness on feet (R26.81)   Pain in right knee (M25.561)       Precautions/Allergies:   Codeine and Meperidine   Fall Risk Score: 6 (? 5 = High Risk)  MD Orders: Evaluate and treat MEDICAL/REFERRING DIAGNOSIS:  Acute pain of right knee [M25.561]   DATE OF ONSET: 2017  REFERRING PHYSICIAN: Annalise Pickett.,*  RETURN PHYSICIAN APPOINTMENT: unspecified   This section established at most recent assessment  INITIAL ASSESSMENT:  Jony Mansfield is a 59 y.o. female who presents to physical therapy for insidious onset of R knee pain beginning in 2017. This session, she demonstrated decreased R LE strength/endurance, significantly increased pain with all R knee mobility, multiple postural/gait deficits, and decreased functional mobility as evident by a score of 6/80 on the Lower Extremity Functional Scale (with lower scores indicating increased disability). Pt may benefit from skilled PT to address the above listed deficits to improve ability to perform pain-free ADLs/IADLs and to improve overall quality of life prior to discharge. PROBLEM LIST (Impacting functional limitations):  1. Decreased Strength  2. Decreased ADL/Functional Activities  3. Decreased Transfer Abilities  4. Decreased Ambulation Ability/Technique  5. Decreased Balance  6. Increased Pain  7. Decreased Activity Tolerance  8. Decreased Flexibility/Joint Mobility  9. Edema/Girth INTERVENTIONS PLANNED:  1. Balance Exercise  2. Bed Mobility  3. Cold  4. Family Education  5. Gait Training  6. Heat  7. Home Exercise Program (HEP)  8. Manual Therapy  9.  Neuromuscular Re-education/Strengthening  10. Range of Motion (ROM)  11. Therapeutic Activites  12. Therapeutic Exercise/Strengthening  13. Transfer Training  14. Ultrasound (US)   TREATMENT PLAN:  Effective Dates: 4/4/2018 TO 7/3/2018 (90 days). Frequency/Duration: 2 times a week for 90 Days  GOALS: (Goals have been discussed and agreed upon with patient.)  Short-Term Functional Goals: Time Frame: 45 days  1. Pt will be compliant with HEP in order to increase LE strength/endurance/mobility to improve functional mobility and overall quality of life. 2. Pt will improve score on the Lower Extremity Functional Scale to 25/80 in order to demonstrate improved functional mobility and quality of life. 3. Pt will improve score on Timed Up and Go Test to 11 seconds with the least restrictive assistive device in order to decrease fall risk and increase safety and independence during ambulation. 4. Pt will be able to ascend/descend 4 steps with S or without rail with reciprocal or step to gait pattern in order to improve community mobility. Discharge Goals: Time Frame: 90 days  1. Pt will be independent with HEP in order to increase LE strength/endurance/mobility to improve functional mobility and overall quality of life. 2. Pt will improve score on the Lower Extremity Functional Scale to 45/80 in order to demonstrate improved functional mobility and quality of life. 3. Pt will improve score on Timed Up and Go Test to 9 seconds with the least restrictive assistive device in order to decrease fall risk and increase safety and independence during ambulation. 4. Pt will be able to ascend/descend 6 steps independently without rail with reciprocal or step to gait pattern in order to improve community mobility. Rehabilitation Potential For Stated Goals: Good  Regarding Iain Mandujano therapy, I certify that the treatment plan above will be carried out by a therapist or under their direction.   Thank you for this referral,  Jean-Claude Adkins, TREVORT     Referring Physician Signature: Blanca Looney.,*              Date                    HISTORY:   History of Present Injury/Illness (Reason for Referral): *History per pt or pt's family except where otherwise noted  Pt states that her knee has been hurting since about 2017 (states she woke up with it hurting, not sure what caused it to start). States she usually sleeps in recliner with her legs elevated (states this is due to asthma), and when she was sleeping on her side one night, she heard her R knee \"pop,\" and it has been hurting ever since. She states there is an intense burning pain in her R anterior knee surrounding the patella along the joint line (does not radiate per pt report). She states some days the pain is worse than others. She states it feels like it is going to give out (and it has given out on her sometimes), she has difficulty walking immediately after standing, and she has significant difficulty going up/down steps. Pain at worst is 8/10 (aggravating activities include straightening her R knee after having it flexed for a long time, walking/standing > 15 minutes, trying to get up from stooped position). Pain at best is 5/10 (eases pain by trying to keep her R leg elevated and extended, avoiding weightbearing activities, IcyHot, wearing copper sleeve). During the past month, she has fallen twice due to her R leg giving out (states that she has also passed out before due to low potassium levels). Pt states she does not smoke. Past Medical History/Comorbidities:   Ms. Daisy Hilton  has a past medical history of Anxiety; Arrhythmia; Asthma (2010); Depression; Diabetes mellitus type 2, controlled, without complications ([address] Dyslipidemia (2010); Hypertension; Migraines; SVT (supraventricular tachycardia) ([address] and Vitamin D deficiency.   Ms. Daisy Hilton  has a past surgical history that includes hx  section (); pr abdomen surgery proc unlisted; hx tonsillectomy (as child); hx tubal ligation (Bilateral); hx kash and bso (2001); and hx lap cholecystectomy (2013). 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 Current Medications:    Current Outpatient Prescriptions:     losartan-hydroCHLOROthiazide (HYZAAR) 100-25 mg per tablet, Take 1 Tab by mouth daily. , Disp: 90 Tab, Rfl: 1    atorvastatin (LIPITOR) 20 mg tablet, Take 1 Tab by mouth daily. , Disp: 90 Tab, Rfl: 1    potassium chloride SA (MICRO-K) 10 mEq capsule, Take 1 Cap by mouth two (2) times a day., Disp: 180 Cap, Rfl: 1    fluticasone-salmeterol (ADVAIR DISKUS) 250-50 mcg/dose diskus inhaler, Take 1 Puff by inhalation every twelve (12) hours. , Disp: 1 Inhaler, Rfl: 12    albuterol (VENTOLIN HFA) 90 mcg/actuation inhaler, Take 2 Puffs by inhalation every four (4) hours as needed for Wheezing., Disp: 1 Inhaler, Rfl: 11    omega-3 acid ethyl esters (LOVAZA) 1 gram capsule, Take 2 Caps by mouth two (2) times a day., Disp: 120 Cap, Rfl: 5    dapagliflozin (FARXIGA) 10 mg tab tablet, Take 1 Tab by mouth daily. , Disp: 90 Tab, Rfl: 1    SITagliptin (JANUVIA) 100 mg tablet, Take 1 Tab by mouth daily. , Disp: 90 Tab, Rfl: 1    meloxicam (MOBIC) 7.5 mg tablet, Take 1 Tab by mouth daily. , Disp: 30 Tab, Rfl: 2    amLODIPine (NORVASC) 5 mg tablet, Take 1 Tab by mouth daily. , Disp: 30 Tab, Rfl: 1    levalbuterol (XOPENEX) 1.25 mg/3 mL nebu, 3 mL by Nebulization route every four (4) hours as needed. , Disp: 30 Nebule, Rfl: 11    meclizine (ANTIVERT) 25 mg tablet, Take 1 Tab by mouth three (3) times daily as needed. , Disp: 30 Tab, Rfl: 2    ondansetron (ZOFRAN ODT) 4 mg disintegrating tablet, Take 1 tablet by mouth every eight (8) hours as needed. , Disp: 20 tablet, Rfl: 1    promethazine (PHENERGAN) 25 mg tablet, Take 1 tablet by mouth every six (6) hours as needed. , Disp: 12 tablet, Rfl: 0   Date Last Reviewed:  4/18/2018   # of Personal Factors/Comorbidities that affect the Plan of Care: 1-2: MODERATE COMPLEXITY   EXAMINATION:   Initial assessment on 4/4/2018  Observation/Orthostatic Postural Assessment:          Kyphotic posture, forward head, rounded shoulders, posterior pelvic tilt in sitting, R knee extended in sitting  Palpation:          Significant pain with R patellar mobilizations (grade 2-3) medially/laterally and inferiorly/superiorly, significant pain with overpressure in R knee extension  ROM:    Initial measurement: (on 4/4/2018) Initial measurement: (on 4/4/2018) Reassessment measurement:  Reassessment measurement:    L LE: WFL throughout hip, knee and ankle (SLR to 90) R LE: WFL throughout, but significant pain throughout knee flexion, especially at end range, eased off slightly with movement into knee extension (but pain with overpressure into knee extension)         Strength:    Initial measurement: (on 4/4/2018) Initial measurement: (on 4/4/2018) Reassessment measurement:  Reassessment measurement:    L LE:  Hip flexion: 4+/5  Hip abduction: 4/5 (pain in R knee)  Hip adduction: 3+/5  Hip IR: 4+/5  Hip ER: 4/5  Knee extension: 4+/5 R LE:  Hip flexion: 4-/5 (pain in anterior R knee)  Hip abduction: 4-/5 (pain in anterior R knee along joint line)  Hip adduction: 3+/5 (pain in anterior R knee along joint line)  Hip IR: 4/5 (pain in anterior R knee along joint line)  Hip ER: 4-/5 (pain in anterior R knee along joint line)  Knee extension: 4-/5 (pain in anterior R knee along joint line)       Special Tests:          Valgus/varus stress test: - at 0 and 30 degrees  McConnel's test for patellar tracking: -  Did not attempt Pranav's test for meniscus due to pt's high guarding and resultant pain with any knee movement  Neurological Screen:        Myotomes:  WFL        Dermatomes:  Pt states she has peripheral neuropathy  Functional Mobility:         Gait/Ambulation:  Pt ambulates with no AD with following gait deficits: decreased R stance time, decreased B step length/clearance, increased lateral sway        Transfers:  [place]; uses hands to push up from/reach back for chair/mat; maintains R knee extension throughout transfer        Bed Mobility:  [place]; increased time  Balance:          Fair to good (increased lateral sway during ambulation)   Body Structures Involved:  1. Nerves  2. Bones  3. Joints  4. Muscles  5. Ligaments Body Functions Affected:  1. Sensory/Pain  2. Neuromusculoskeletal  3. Movement Related Activities and Participation Affected:  1. General Tasks and Demands  2. Mobility  3. Self Care  4. Domestic Life  5. Interpersonal Interactions and Relationships  6. Community, Social and [place]   # of elements that affect the Plan of Care: 4+: HIGH COMPLEXITY   CLINICAL PRESENTATION:   Presentation: Evolving clinical presentation with changing clinical characteristics: MODERATE COMPLEXITY   CLINICAL DECISION MAKING:   Outcome Measure: Tool Used: Lower Extremity Functional Scale (LEFS)  Score:  Initial: 6/80 Most Recent: X/80 (Date: -- )   Interpretation of Score: 20 questions each scored on a 5 point scale with 0 representing \"extreme difficulty or unable to perform\" and 4 representing \"no difficulty\". The lower the score, the greater the functional disability. 80/80 represents no disability. Minimal detectable change is 9 points.   Score 80 79-63 62-48 47-32 31-16 15-1 0   Modifier CH CI CJ CK CL CM CN     Tool Used: Timed Up and Go (TUG)  Score:  Initial: 13 seconds no AD Most Recent: X seconds (Date: -- )   Interpretation of Score: The test measures, in seconds, the time taken by an individual to stand up from a standard arm chair (seat height 46 cm [18 in], arm height 65 cm [25.6 in]), walk a distance of 3 meters (118 in, approx 10 ft), turn, walk back to the chair and sit down. If the individual takes longer than 14 seconds to complete TUG, this indicates risk for falls. Score 7 7.5-10.5 11-14 14.5-17.5 18-21 21.5-24.5 25+   Modifier CH CI CJ CK CL CM CN     Payor: [employer] / Plan: [address] [place] [address] / Product Type: PPO /   Medical Necessity:   · Patient is expected to demonstrate progress in strength, range of motion, balance and functional technique to improve ability to perform pain-free standing/ambulation and to improve overall quality of life. · Patient demonstrates good rehab potential due to higher previous functional level. Reason for Services/Other Comments:  · Patient continues to require modification of therapeutic interventions to increase complexity of exercises. Use of outcome tool(s) and clinical judgement create a POC that gives a: Questionable prediction of patient's progress: MODERATE COMPLEXITY   TREATMENT:   (In addition to Assessment/Re-Assessment sessions the following treatments were rendered)  Subjective comments at beginning of session: Pt reports that her knee pain is 7/10 pain today; states it was worse initially after last session, but then returned to same amount of pain; states she is able to sleep a little better now     Therapeutic Exercise: ( 8 minutes):  Exercises per grid below to improve mobility, strength, balance and dynamic movement of knee - right to improve functional mobility. Required minimal visual, verbal and manual cues to promote proper body alignment, promote proper body posture and promote proper body mechanics. Progressed resistance, range and repetitions as indicated.     Date:  4/9/2018 Date:  4/16/18 Date:  4/18/2018   Activity/Exercise Parameters Parameters    Nustep L1 resistance for 6 minutes with B LEs and UEs to increase blood flow and knee mobility L1 resistance for 10 minutes with B LEs and UEs to increase blood flow and knee mobility    Hamstring stretch  3 x 30 second hold right LE   3 x 30 second hold right LE   Side lying TFL stretch  3 x 30 second hold right LE  In comfortable range    Prone quad stretch  3 x 30 second hold right LE in comfortable range      Piriformis stretch  3 x 30 second hold right LE in comfortable range      SLR in supine*   10 reps/1 set - pt had difficulty performing due to back pain even with cues for posterior pelvic tilt and core activation   SAQ in supine*   20 reps/1 set R LE only   *given in HEP    MANUAL THERAPY: (30 minutes): Joint mobilization, Soft tissue mobilization and Manual lymphatic drainage was utilized and necessary because of the patient's restricted joint motion, painful spasm and restricted motion of soft tissue. With pt in supported supine position with R LE supported, STM and retrograde edema massage performed to quads, hip abductors/adductors, hamstrings, ankle dorsiflexors and plantarflexors to reduce muscular tightness and pain. Focused on cross friction massage and STM over R anteromedial knee to reduce plica pain. Modalities () pt states she is going to ice at home    Treatment/Session Assessment:  Pt demonstrating improved R knee mobility with decreased pain after manual therapy this session. 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 She reported decreased pain at end of session after manual therapy. Will continue with hamstring stretching and quad strengthening. · Pain/ Symptoms: Initial:   7/10 Post Session:  4/10 ·   Compliance with Program/Exercises: Will assess as treatment progresses. · Recommendations/Intent for next treatment session:  \"Next visit will focus on advancements to more challenging activities and reduction in assistance provided\".  Continue to work on manual therapy/modalities as needed to reduce pain; gait training with cane if needed; gentle strengthening exercises; postural exercises (to encourage pt to avoid hyperextension at knees)  Total Treatment Duration:  PT Patient Time In/Time Out  Time In: 0930  Time Out: 1 Raymundo Maddox DPT

## 2018-04-18 NOTE — PROGRESS NOTES
CCM progress note:  Telephonic outreach to follow up with patient and to introduce self as new CM. This CM has made previous attempts to contact. Patient verified her  and zip code. Patient is having left knee pain and is receiving physical therapy two days a week to address the pain. Patient reports there is not swelling or discoloration, just pain. She is unsure of the cause of the discomfort. Patient is monitoring her blood sugars TID, sugars staying in the 90's most of the time. Patient has a diabetic , when she checks her blood sugar it automatically sends the result to her  and she is given feedback. Patient did start on a new medication, Januvia as blood sugar was elevated at her last PCP appointment. Patient was also started on Norvasc daily as blood pressure was elevated as well. Patient states that she does check her blood sugar, usually twice daily at home. Her pressures are typically elevated when checked. She is hopeful that the new medication will bring the sugars down. Discussed diet and controlling her blood sugars with diet. Next follow up appointment scheduled with her PCP, Dr. Júnior Whalen on . Patient also scheduled for physical therapy on  and . Will follow up with patient after her next PCP appointment on .

## 2018-04-23 ENCOUNTER — HOSPITAL ENCOUNTER (OUTPATIENT)
Dept: PHYSICAL THERAPY | Age: 58
Discharge: HOME | End: 2018-04-23
Attending: FAMILY MEDICINE
Payer: COMMERCIAL

## 2018-04-25 ENCOUNTER — HOSPITAL ENCOUNTER (OUTPATIENT)
Dept: PHYSICAL THERAPY | Age: 58
Discharge: HOME | End: 2018-04-25
Attending: FAMILY MEDICINE
Payer: COMMERCIAL

## 2018-04-25 PROCEDURE — 97110 THERAPEUTIC EXERCISES: CPT

## 2018-04-25 PROCEDURE — 97140 MANUAL THERAPY 1/> REGIONS: CPT

## 2018-04-25 NOTE — PROGRESS NOTES
Lolis Truong  : 1960  Primary: [address] [facility]   [address], [address], [place], [address] [facility]  [phone]   [phone]       OUTPATIENT PHYSICAL THERAPY:Daily Note 2018    ICD-10: Treatment Diagnosis: Difficulty in walking, not elsewhere classified (R26.2)   Unsteadiness on feet (R26.81)   Pain in right knee (M25.561)       Precautions/Allergies:   Codeine and Meperidine   Fall Risk Score: 6 (? 5 = High Risk)  MD Orders: Evaluate and treat MEDICAL/REFERRING DIAGNOSIS:  Acute pain of right knee [M25.561]   DATE OF ONSET: 2017  REFERRING PHYSICIAN: Mireya Peña.,*  RETURN PHYSICIAN APPOINTMENT: unspecified   This section established at most recent assessment  INITIAL ASSESSMENT:  Lolis Truong is a 59 y.o. female who presents to physical therapy for insidious onset of R knee pain beginning in 2017. This session, she demonstrated decreased R LE strength/endurance, significantly increased pain with all R knee mobility, multiple postural/gait deficits, and decreased functional mobility as evident by a score of 6/80 on the Lower Extremity Functional Scale (with lower scores indicating increased disability). Pt may benefit from skilled PT to address the above listed deficits to improve ability to perform pain-free ADLs/IADLs and to improve overall quality of life prior to discharge. PROBLEM LIST (Impacting functional limitations):  1. Decreased Strength  2. Decreased ADL/Functional Activities  3. Decreased Transfer Abilities  4. Decreased Ambulation Ability/Technique  5. Decreased Balance  6. Increased Pain  7. Decreased Activity Tolerance  8. Decreased Flexibility/Joint Mobility  9. Edema/Girth INTERVENTIONS PLANNED:  1. Balance Exercise  2. Bed Mobility  3. Cold  4. Family Education  5. Gait Training  6. Heat  7. Home Exercise Program (HEP)  8. Manual Therapy  9.  Neuromuscular Re-education/Strengthening  10. Range of Motion (ROM)  11. Therapeutic Activites  12. Therapeutic Exercise/Strengthening  13. Transfer Training  14. Ultrasound (US)   TREATMENT PLAN:  Effective Dates: 4/4/2018 TO 7/3/2018 (90 days). Frequency/Duration: 2 times a week for 90 Days  GOALS: (Goals have been discussed and agreed upon with patient.)  Short-Term Functional Goals: Time Frame: 45 days  1. Pt will be compliant with HEP in order to increase LE strength/endurance/mobility to improve functional mobility and overall quality of life. 2. Pt will improve score on the Lower Extremity Functional Scale to 25/80 in order to demonstrate improved functional mobility and quality of life. 3. Pt will improve score on Timed Up and Go Test to 11 seconds with the least restrictive assistive device in order to decrease fall risk and increase safety and independence during ambulation. 4. Pt will be able to ascend/descend 4 steps with S or without rail with reciprocal or step to gait pattern in order to improve community mobility. Discharge Goals: Time Frame: 90 days  1. Pt will be independent with HEP in order to increase LE strength/endurance/mobility to improve functional mobility and overall quality of life. 2. Pt will improve score on the Lower Extremity Functional Scale to 45/80 in order to demonstrate improved functional mobility and quality of life. 3. Pt will improve score on Timed Up and Go Test to 9 seconds with the least restrictive assistive device in order to decrease fall risk and increase safety and independence during ambulation. 4. Pt will be able to ascend/descend 6 steps independently without rail with reciprocal or step to gait pattern in order to improve community mobility. Rehabilitation Potential For Stated Goals: Good  Regarding [facility] therapy, I certify that the treatment plan above will be carried out by a therapist or under their direction.   Thank you for this referral,  John Beverly DPT     Referring Physician Signature: Elian Katerina,*              Date                    HISTORY:   History of Present Injury/Illness (Reason for Referral): *History per pt or pt's family except where otherwise noted  Pt states that her knee has been hurting since about 2017 (states she woke up with it hurting, not sure what caused it to start). States she usually sleeps in recliner with her legs elevated (states this is due to asthma), and when she was sleeping on her side one night, she heard her R knee \"pop,\" and it has been hurting ever since. She states there is an intense burning pain in her R anterior knee surrounding the patella along the joint line (does not radiate per pt report). She states some days the pain is worse than others. She states it feels like it is going to give out (and it has given out on her sometimes), she has difficulty walking immediately after standing, and she has significant difficulty going up/down steps. Pain at worst is 8/10 (aggravating activities include straightening her R knee after having it flexed for a long time, walking/standing > 15 minutes, trying to get up from stooped position). Pain at best is 5/10 (eases pain by trying to keep her R leg elevated and extended, avoiding weightbearing activities, I[employer], wearing copper sleeve). During the past month, she has fallen twice due to her R leg giving out (states that she has also passed out before due to low potassium levels). Pt states she does not smoke. Past Medical History/Comorbidities:   Ms. Srinivas Hanley  has a past medical history of Anxiety; Arrhythmia; Asthma (2010); Depression; Diabetes mellitus type 2, controlled, without complications ([address] Dyslipidemia (2010); Hypertension; Migraines; SVT (supraventricular tachycardia) ([address] and Vitamin D deficiency.   Ms. Srinivas Hanley  has a past surgical history that includes hx  section (); pr abdomen surgery proc unlisted; hx tonsillectomy (as child); hx tubal ligation (Bilateral); hx kash and bso (2001); and hx lap cholecystectomy (2013). 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 Current Medications:    Current Outpatient Prescriptions:     losartan-hydroCHLOROthiazide (HYZAAR) 100-25 mg per tablet, Take 1 Tab by mouth daily. , Disp: 90 Tab, Rfl: 1    atorvastatin (LIPITOR) 20 mg tablet, Take 1 Tab by mouth daily. , Disp: 90 Tab, Rfl: 1    potassium chloride SA (MICRO-K) 10 mEq capsule, Take 1 Cap by mouth two (2) times a day., Disp: 180 Cap, Rfl: 1    fluticasone-salmeterol (ADVAIR DISKUS) 250-50 mcg/dose diskus inhaler, Take 1 Puff by inhalation every twelve (12) hours. , Disp: 1 Inhaler, Rfl: 12    albuterol (VENTOLIN HFA) 90 mcg/actuation inhaler, Take 2 Puffs by inhalation every four (4) hours as needed for Wheezing., Disp: 1 Inhaler, Rfl: 11    omega-3 acid ethyl esters (LOVAZA) 1 gram capsule, Take 2 Caps by mouth two (2) times a day., Disp: 120 Cap, Rfl: 5    dapagliflozin (FARXIGA) 10 mg tab tablet, Take 1 Tab by mouth daily. , Disp: 90 Tab, Rfl: 1    SITagliptin (JANUVIA) 100 mg tablet, Take 1 Tab by mouth daily. , Disp: 90 Tab, Rfl: 1    meloxicam (MOBIC) 7.5 mg tablet, Take 1 Tab by mouth daily. , Disp: 30 Tab, Rfl: 2    amLODIPine (NORVASC) 5 mg tablet, Take 1 Tab by mouth daily. , Disp: 30 Tab, Rfl: 1    levalbuterol (XOPENEX) 1.25 mg/3 mL nebu, 3 mL by Nebulization route every four (4) hours as needed. , Disp: 30 Nebule, Rfl: 11    meclizine (ANTIVERT) 25 mg tablet, Take 1 Tab by mouth three (3) times daily as needed. , Disp: 30 Tab, Rfl: 2    ondansetron (ZOFRAN ODT) 4 mg disintegrating tablet, Take 1 tablet by mouth every eight (8) hours as needed. , Disp: 20 tablet, Rfl: 1    promethazine (PHENERGAN) 25 mg tablet, Take 1 tablet by mouth every six (6) hours as needed. , Disp: 12 tablet, Rfl: 0   Date Last Reviewed:  4/25/2018   # of Personal Factors/Comorbidities that affect the Plan of Care: 1-2: MODERATE COMPLEXITY   EXAMINATION:   Initial assessment on 4/4/2018  Observation/Orthostatic Postural Assessment:          Kyphotic posture, forward head, rounded shoulders, posterior pelvic tilt in sitting, R knee extended in sitting  Palpation:          Significant pain with R patellar mobilizations (grade 2-3) medially/laterally and inferiorly/superiorly, significant pain with overpressure in R knee extension  ROM:    Initial measurement: (on 4/4/2018) Initial measurement: (on 4/4/2018) Reassessment measurement:  Reassessment measurement:    L LE: WFL throughout hip, knee and ankle (SLR to 90) R LE: WFL throughout, but significant pain throughout knee flexion, especially at end range, eased off slightly with movement into knee extension (but pain with overpressure into knee extension)         Strength:    Initial measurement: (on 4/4/2018) Initial measurement: (on 4/4/2018) Reassessment measurement:  Reassessment measurement:    L LE:  Hip flexion: 4+/5  Hip abduction: 4/5 (pain in R knee)  Hip adduction: 3+/5  Hip IR: 4+/5  Hip ER: 4/5  Knee extension: 4+/5 R LE:  Hip flexion: 4-/5 (pain in anterior R knee)  Hip abduction: 4-/5 (pain in anterior R knee along joint line)  Hip adduction: 3+/5 (pain in anterior R knee along joint line)  Hip IR: 4/5 (pain in anterior R knee along joint line)  Hip ER: 4-/5 (pain in anterior R knee along joint line)  Knee extension: 4-/5 (pain in anterior R knee along joint line)       Special Tests:          Valgus/varus stress test: - at 0 and 30 degrees  McConnel's test for patellar tracking: -  Did not attempt Pranav's test for meniscus due to pt's high guarding and resultant pain with any knee movement  Neurological Screen:        Myotomes:  WFL        Dermatomes:  Pt states she has peripheral neuropathy  Functional Mobility:         Gait/Ambulation:  Pt ambulates with no AD with following gait deficits: decreased R stance time, decreased B step length/clearance, increased lateral sway        Transfers:  [place]; uses hands to push up from/reach back for chair/mat; maintains R knee extension throughout transfer        Bed Mobility:  [place]; increased time  Balance:          Fair to good (increased lateral sway during ambulation)   Body Structures Involved:  1. Nerves  2. Bones  3. Joints  4. Muscles  5. Ligaments Body Functions Affected:  1. Sensory/Pain  2. Neuromusculoskeletal  3. Movement Related Activities and Participation Affected:  1. General Tasks and Demands  2. Mobility  3. Self Care  4. Domestic Life  5. Interpersonal Interactions and Relationships  6. Community, Social and [place]   # of elements that affect the Plan of Care: 4+: HIGH COMPLEXITY   CLINICAL PRESENTATION:   Presentation: Evolving clinical presentation with changing clinical characteristics: MODERATE COMPLEXITY   CLINICAL DECISION MAKING:   Outcome Measure: Tool Used: Lower Extremity Functional Scale (LEFS)  Score:  Initial: 6/80 Most Recent: X/80 (Date: -- )   Interpretation of Score: 20 questions each scored on a 5 point scale with 0 representing \"extreme difficulty or unable to perform\" and 4 representing \"no difficulty\". The lower the score, the greater the functional disability. 80/80 represents no disability. Minimal detectable change is 9 points.   Score 80 79-63 62-48 47-32 31-16 15-1 0   Modifier CH CI CJ CK CL CM CN     Tool Used: Timed Up and Go (TUG)  Score:  Initial: 13 seconds no AD Most Recent: X seconds (Date: -- )   Interpretation of Score: The test measures, in seconds, the time taken by an individual to stand up from a standard arm chair (seat height 46 cm [18 in], arm height 65 cm [25.6 in]), walk a distance of 3 meters (118 in, approx 10 ft), turn, walk back to the chair and sit down. If the individual takes longer than 14 seconds to complete TUG, this indicates risk for falls. Score 7 7.5-10.5 11-14 14.5-17.5 18-21 21.5-24.5 25+   Modifier CH CI CJ CK CL CM CN     Payor: [employer] / Plan: Angella Plascencia / Product Type: PPO /   Medical Necessity:   · Patient is expected to demonstrate progress in strength, range of motion, balance and functional technique to improve ability to perform pain-free standing/ambulation and to improve overall quality of life. · Patient demonstrates good rehab potential due to higher previous functional level. Reason for Services/Other Comments:  · Patient continues to require modification of therapeutic interventions to increase complexity of exercises. Use of outcome tool(s) and clinical judgement create a POC that gives a: Questionable prediction of patient's progress: MODERATE COMPLEXITY   TREATMENT:   (In addition to Assessment/Re-Assessment sessions the following treatments were rendered)  Subjective comments at beginning of session: Pt reports that her knee pain was significantly worse after last session, but she was also working for 12-16 hour shifts for 4 days afterward so this might have contributed     Therapeutic Exercise: ( 15 minutes):  Exercises per grid below to improve mobility, strength, balance and dynamic movement of knee - right to improve functional mobility. Required minimal visual, verbal and manual cues to promote proper body alignment, promote proper body posture and promote proper body mechanics. Progressed resistance, range and repetitions as indicated.     Date:  4/16/18 Date:  4/18/2018 Date:  4/25/2018   Activity/Exercise Parameters     Nustep L1 resistance for 10 minutes with B LEs and UEs to increase blood flow and knee mobility  L3 resistance for 7 minutes with B LEs and UEs to increase blood flow and knee mobility   Hamstring stretch 3 x 30 second hold right LE   3 x 30 second hold right LE 3 x 30 second hold right LE   Side lying TFL stretch 3 x 30 second hold right LE  In comfortable range     Prone quad stretch 3 x 30 second hold right LE in comfortable range       Piriformis stretch 3 x 30 second hold right LE in comfortable range       SLR in supine*  10 reps/1 set - pt had difficulty performing due to back pain even with cues for posterior pelvic tilt and core activation 10 reps/2 sets R LE - pt had difficulty performing due to back pain even with cues for posterior pelvic tilt and core activation - cues to avoid complete extension of knee to reduce pain   SAQ in supine*  20 reps/1 set R LE only 20 reps/1 set R LE then L LE   Incline board for ankle plantarflexors    30 second hold x 3 reps with knees slightly flexed (attempted with knees extended but discontinued due to pain in anterior R knee)   *given in HEP    MANUAL THERAPY: (15 minutes): Joint mobilization, Soft tissue mobilization and Manual lymphatic drainage was utilized and necessary because of the patient's restricted joint motion, painful spasm and restricted motion of soft tissue. With pt in supported supine position with R LE supported, pulsed ultrasound performed (3 MHz, 1.3 W/cm2, 20%) to medial R knee to reduce inflammation and pain. With pt in same position, small amount of kinesiotape applied to medial R knee with 25% stretch (pt approved this even though she states she does not always react well to tape, states she wants to try it at least once) to reduce pressure at medial knee and reduce pain and inflammation.     Modalities (10 minutes) With pt in supported supine position with R LE supported, cold pack applied to R knee to reduce pain and inflammation at end of session. Treatment/Session Assessment:  Pt reporting increased pain with all exercises this session that was reduced slightly with modifications to R knee positioning, but reported significantly improved pain after manual therapy this session. · Pain/ Symptoms: Initial:   7/10 Post Session:  0/10 ·   Compliance with Program/Exercises: Will assess as treatment progresses. · Recommendations/Intent for next treatment session: \"Next visit will focus on advancements to more challenging activities and reduction in assistance provided\".  Continue to work on manual therapy/modalities as needed to reduce pain; gait training with cane if needed; gentle strengthening exercises; postural exercises (to encourage pt to avoid hyperextension at knees)  Total Treatment Duration:  PT Patient Time In/Time Out  Time In: 0930  Time Out: [address] [facility] [facility], [facility]

## 2018-04-30 ENCOUNTER — HOSPITAL ENCOUNTER (OUTPATIENT)
Dept: PHYSICAL THERAPY | Age: 58
Discharge: HOME | End: 2018-04-30
Attending: FAMILY MEDICINE
Payer: COMMERCIAL

## 2018-04-30 PROCEDURE — 97110 THERAPEUTIC EXERCISES: CPT

## 2018-04-30 PROCEDURE — 97140 MANUAL THERAPY 1/> REGIONS: CPT

## 2018-04-30 NOTE — PROGRESS NOTES
Bessie Gilliland  : 1960  Primary: [facility]  [address], [address], [place], [address] [facility]  [phone]   [phone]       OUTPATIENT PHYSICAL THERAPY:Daily Note 2018    ICD-10: Treatment Diagnosis: Difficulty in walking, not elsewhere classified (R26.2)   Unsteadiness on feet (R26.81)   Pain in right knee (M25.561)       Precautions/Allergies:   Codeine and Meperidine   Fall Risk Score: 6 (? 5 = High Risk)  MD Orders: Evaluate and treat MEDICAL/REFERRING DIAGNOSIS:  Acute pain of right knee [M25.561]   DATE OF ONSET: 2017  REFERRING PHYSICIAN: Endy Saldana.,*  RETURN PHYSICIAN APPOINTMENT: unspecified   This section established at most recent assessment  INITIAL ASSESSMENT:  Bessie Gilliland is a 59 y.o. female who presents to physical therapy for insidious onset of R knee pain beginning in 2017. This session, she demonstrated decreased R LE strength/endurance, significantly increased pain with all R knee mobility, multiple postural/gait deficits, and decreased functional mobility as evident by a score of 6/80 on the Lower Extremity Functional Scale (with lower scores indicating increased disability). Pt may benefit from skilled PT to address the above listed deficits to improve ability to perform pain-free ADLs/IADLs and to improve overall quality of life prior to discharge. PROBLEM LIST (Impacting functional limitations):  1. Decreased Strength  2. Decreased ADL/Functional Activities  3. Decreased Transfer Abilities  4. Decreased Ambulation Ability/Technique  5. Decreased Balance  6. Increased Pain  7. Decreased Activity Tolerance  8. Decreased Flexibility/Joint Mobility  9. Edema/Girth INTERVENTIONS PLANNED:  1. Balance Exercise  2. Bed Mobility  3. Cold  4. Family Education  5. Gait Training  6. Heat  7. Home Exercise Program (HEP)  8. Manual Therapy  9.  Neuromuscular Re-education/Strengthening  10. Range of Motion (ROM)  11. Therapeutic Activites  12. Therapeutic Exercise/Strengthening  13. Transfer Training  14. Ultrasound (US)   TREATMENT PLAN:  Effective Dates: 4/4/2018 TO 7/3/2018 (90 days). Frequency/Duration: 2 times a week for 90 Days  GOALS: (Goals have been discussed and agreed upon with patient.)  Short-Term Functional Goals: Time Frame: 45 days  1. Pt will be compliant with HEP in order to increase LE strength/endurance/mobility to improve functional mobility and overall quality of life. 2. Pt will improve score on the Lower Extremity Functional Scale to 25/80 in order to demonstrate improved functional mobility and quality of life. 3. Pt will improve score on Timed Up and Go Test to 11 seconds with the least restrictive assistive device in order to decrease fall risk and increase safety and independence during ambulation. 4. Pt will be able to ascend/descend 4 steps with S or without rail with reciprocal or step to gait pattern in order to improve community mobility. Discharge Goals: Time Frame: 90 days  1. Pt will be independent with HEP in order to increase LE strength/endurance/mobility to improve functional mobility and overall quality of life. 2. Pt will improve score on the Lower Extremity Functional Scale to 45/80 in order to demonstrate improved functional mobility and quality of life. 3. Pt will improve score on Timed Up and Go Test to 9 seconds with the least restrictive assistive device in order to decrease fall risk and increase safety and independence during ambulation. 4. Pt will be able to ascend/descend 6 steps independently without rail with reciprocal or step to gait pattern in order to improve community mobility. Rehabilitation Potential For Stated Goals: Good  Regarding [facility] [facility] therapy, I certify that the treatment plan above will be carried out by a therapist or under their direction.   Thank you for this referral,  Georgiana Ortiz DPT     Referring Physician Signature: Agatha Reyes.,*              Date                    HISTORY:   History of Present Injury/Illness (Reason for Referral): *History per pt or pt's family except where otherwise noted  Pt states that her knee has been hurting since about 2017 (states she woke up with it hurting, not sure what caused it to start). States she usually sleeps in recliner with her legs elevated (states this is due to asthma), and when she was sleeping on her side one night, she heard her R knee \"pop,\" and it has been hurting ever since. She states there is an intense burning pain in her R anterior knee surrounding the patella along the joint line (does not radiate per pt report). She states some days the pain is worse than others. She states it feels like it is going to give out (and it has given out on her sometimes), she has difficulty walking immediately after standing, and she has significant difficulty going up/down steps. Pain at worst is 8/10 (aggravating activities include straightening her R knee after having it flexed for a long time, walking/standing > 15 minutes, trying to get up from stooped position). Pain at best is 5/10 (eases pain by trying to keep her R leg elevated and extended, avoiding weightbearing activities, Icy[employer], wearing copper sleeve). During the past month, she has fallen twice due to her R leg giving out (states that she has also passed out before due to low potassium levels). Pt states she does not smoke. Past Medical History/Comorbidities:   Ms. Uri Cortez  has a past medical history of Anxiety; Arrhythmia; Asthma (2010); Depression; Diabetes mellitus type 2, controlled, without complications ([address] Dyslipidemia (2010); Hypertension; Migraines; SVT (supraventricular tachycardia) ([address] and Vitamin D deficiency.   Ms. Uri Cortez  has a past surgical history that includes hx  section (); pr abdomen surgery proc unlisted; hx tonsillectomy (as child); hx tubal ligation (Bilateral); hx kash and bso (2001); and hx lap cholecystectomy (2013). 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 Current Medications:    Current Outpatient Prescriptions:     losartan-hydroCHLOROthiazide (HYZAAR) 100-25 mg per tablet, Take 1 Tab by mouth daily. , Disp: 90 Tab, Rfl: 1    atorvastatin (LIPITOR) 20 mg tablet, Take 1 Tab by mouth daily. , Disp: 90 Tab, Rfl: 1    potassium chloride SA (MICRO-K) 10 mEq capsule, Take 1 Cap by mouth two (2) times a day., Disp: 180 Cap, Rfl: 1    fluticasone-salmeterol (ADVAIR DISKUS) 250-50 mcg/dose diskus inhaler, Take 1 Puff by inhalation every twelve (12) hours. , Disp: 1 Inhaler, Rfl: 12    albuterol (VENTOLIN HFA) 90 mcg/actuation inhaler, Take 2 Puffs by inhalation every four (4) hours as needed for Wheezing., Disp: 1 Inhaler, Rfl: 11    omega-3 acid ethyl esters (LOVAZA) 1 gram capsule, Take 2 Caps by mouth two (2) times a day., Disp: 120 Cap, Rfl: 5    dapagliflozin (FARXIGA) 10 mg tab tablet, Take 1 Tab by mouth daily. , Disp: 90 Tab, Rfl: 1    SITagliptin (JANUVIA) 100 mg tablet, Take 1 Tab by mouth daily. , Disp: 90 Tab, Rfl: 1    meloxicam (MOBIC) 7.5 mg tablet, Take 1 Tab by mouth daily. , Disp: 30 Tab, Rfl: 2    amLODIPine (NORVASC) 5 mg tablet, Take 1 Tab by mouth daily. , Disp: 30 Tab, Rfl: 1    levalbuterol (XOPENEX) 1.25 mg/3 mL nebu, 3 mL by Nebulization route every four (4) hours as needed. , Disp: 30 Nebule, Rfl: 11    meclizine (ANTIVERT) 25 mg tablet, Take 1 Tab by mouth three (3) times daily as needed. , Disp: 30 Tab, Rfl: 2    ondansetron (ZOFRAN ODT) 4 mg disintegrating tablet, Take 1 tablet by mouth every eight (8) hours as needed. , Disp: 20 tablet, Rfl: 1    promethazine (PHENERGAN) 25 mg tablet, Take 1 tablet by mouth every six (6) hours as needed. , Disp: 12 tablet, Rfl: 0   Date Last Reviewed:  4/30/2018   # of Personal Factors/Comorbidities that affect the Plan of Care: 1-2: MODERATE COMPLEXITY   EXAMINATION:   Initial assessment on 4/4/2018  Observation/Orthostatic Postural Assessment:          Kyphotic posture, forward head, rounded shoulders, posterior pelvic tilt in sitting, R knee extended in sitting  Palpation:          Significant pain with R patellar mobilizations (grade 2-3) medially/laterally and inferiorly/superiorly, significant pain with overpressure in R knee extension  ROM:    Initial measurement: (on 4/4/2018) Initial measurement: (on 4/4/2018) Reassessment measurement:  Reassessment measurement:    L LE: WFL throughout hip, knee and ankle (SLR to 90) R LE: WFL throughout, but significant pain throughout knee flexion, especially at end range, eased off slightly with movement into knee extension (but pain with overpressure into knee extension)         Strength:    Initial measurement: (on 4/4/2018) Initial measurement: (on 4/4/2018) Reassessment measurement:  Reassessment measurement:    L LE:  Hip flexion: 4+/5  Hip abduction: 4/5 (pain in R knee)  Hip adduction: 3+/5  Hip IR: 4+/5  Hip ER: 4/5  Knee extension: 4+/5 R LE:  Hip flexion: 4-/5 (pain in anterior R knee)  Hip abduction: 4-/5 (pain in anterior R knee along joint line)  Hip adduction: 3+/5 (pain in anterior R knee along joint line)  Hip IR: 4/5 (pain in anterior R knee along joint line)  Hip ER: 4-/5 (pain in anterior R knee along joint line)  Knee extension: 4-/5 (pain in anterior R knee along joint line)       Special Tests:          Valgus/varus stress test: - at 0 and 30 degrees  McConnel's test for patellar tracking: -  Did not attempt Pranav's test for meniscus due to pt's high guarding and resultant pain with any knee movement  Neurological Screen:        Myotomes:  WFL        Dermatomes:  Pt states she has peripheral neuropathy  Functional Mobility:         Gait/Ambulation:  Pt ambulates with no AD with following gait deficits: decreased R stance time, decreased B step length/clearance, increased lateral sway        Transfers:  [place]; uses hands to push up from/reach back for chair/mat; maintains R knee extension throughout transfer        Bed Mobility:  [place]; increased time  Balance:          Fair to good (increased lateral sway during ambulation)   Body Structures Involved:  1. Nerves  2. Bones  3. Joints  4. Muscles  5. Ligaments Body Functions Affected:  1. Sensory/Pain  2. Neuromusculoskeletal  3. Movement Related Activities and Participation Affected:  1. General Tasks and Demands  2. Mobility  3. Self Care  4. Domestic Life  5. Interpersonal Interactions and Relationships  6. Community, Social and [place]   # of elements that affect the Plan of Care: 4+: HIGH COMPLEXITY   CLINICAL PRESENTATION:   Presentation: Evolving clinical presentation with changing clinical characteristics: MODERATE COMPLEXITY   CLINICAL DECISION MAKING:   Outcome Measure: Tool Used: Lower Extremity Functional Scale (LEFS)  Score:  Initial: 6/80 Most Recent: X/80 (Date: -- )   Interpretation of Score: 20 questions each scored on a 5 point scale with 0 representing \"extreme difficulty or unable to perform\" and 4 representing \"no difficulty\". The lower the score, the greater the functional disability. 80/80 represents no disability. Minimal detectable change is 9 points.   Score 80 79-63 62-48 47-32 31-16 15-1 0   Modifier CH CI CJ CK CL CM CN     Tool Used: Timed Up and Go (TUG)  Score:  Initial: 13 seconds no AD Most Recent: X seconds (Date: -- )   Interpretation of Score: The test measures, in seconds, the time taken by an individual to stand up from a standard arm chair (seat height 46 cm [18 in], arm height 65 cm [25.6 in]), walk a distance of 3 meters (118 in, approx 10 ft), turn, walk back to the chair and sit down. If the individual takes longer than 14 seconds to complete TUG, this indicates risk for falls. Score 7 7.5-10.5 11-14 14.5-17.5 18-21 21.5-24.5 25+   Modifier CH CI CJ CK CL CM CN     Payor: [employer] / Plan: Rubin Messina / Product Type: PPO /   Medical Necessity:   · Patient is expected to demonstrate progress in strength, range of motion, balance and functional technique to improve ability to perform pain-free standing/ambulation and to improve overall quality of life. · Patient demonstrates good rehab potential due to higher previous functional level. Reason for Services/Other Comments:  · Patient continues to require modification of therapeutic interventions to increase complexity of exercises. Use of outcome tool(s) and clinical judgement create a POC that gives a: Questionable prediction of patient's progress: MODERATE COMPLEXITY   TREATMENT:   (In addition to Assessment/Re-Assessment sessions the following treatments were rendered)  Subjective comments at beginning of session: Pt reports that her knee pain was significantly better after last session (she was at 0/10 pain) - states she was able to work without as much pain (she found tape at [facility] to try to replicate what was done in therapy and she found this helped) - no reactions to tape     Therapeutic Exercise: ( 17 minutes):  Exercises per grid below to improve mobility, strength, balance and dynamic movement of knee - right to improve functional mobility. Required minimal visual, verbal and manual cues to promote proper body alignment, promote proper body posture and promote proper body mechanics. Progressed resistance, range and repetitions as indicated. Date:  4/18/2018 Date:  4/25/2018 Date:  4/30/2018   Activity/Exercise      Nustep  L3 resistance for 7 minutes with B LEs and UEs to increase blood flow and knee mobility L3 resistance for 10 minutes with B LEs and UEs to increase blood flow and knee mobility   Hamstring stretch 3 x 30 second hold right LE 3 x 30 second hold right LE 3 x 30 second hold right LE   Side lying TFL stretch      Prone quad stretch      Piriformis stretch      SLR in supine* 10 reps/1 set - pt had difficulty performing due to back pain even with cues for posterior pelvic tilt and core activation 10 reps/2 sets R LE - pt had difficulty performing due to back pain even with cues for posterior pelvic tilt and core activation - cues to avoid complete extension of knee to reduce pain 10 reps/2 sets R LE - pt had difficulty performing due to back pain even with cues for posterior pelvic tilt and core activation - cues to avoid complete extension of knee to reduce pain   SAQ in supine* 20 reps/1 set R LE only 20 reps/1 set R LE then L LE 20 reps/1 set R LE then L LE   Incline board for ankle plantarflexors   30 second hold x 3 reps with knees slightly flexed (attempted with knees extended but discontinued due to pain in anterior R knee) 30 second hold x 3 reps with knees extended then flexed   *given in HEP    MANUAL THERAPY: (23 minutes): Joint mobilization, Soft tissue mobilization and Manual lymphatic drainage was utilized and necessary because of the patient's restricted joint motion, painful spasm and restricted motion of soft tissue. With pt in supported supine position with R LE supported, pulsed ultrasound performed (3 MHz, 1.3 W/cm2, 20%) to medial R knee to reduce inflammation and pain. With pt in same position, kinesiotape applied to medial/anterior R knee with 25% stretch to reduce pressure at medial knee and reduce pain and inflammation.     Modalities () Pt states she will ice at home    Treatment/Session Assessment:  Pt reporting significantly improved pain overall this session. She was able to perform all exercises with no significant increase in pain. · Pain/ Symptoms: Initial:   5/10 in R knee Post Session:  0/10 ·   Compliance with Program/Exercises: Will assess as treatment progresses. · Recommendations/Intent for next treatment session: \"Next visit will focus on advancements to more challenging activities and reduction in assistance provided\".  Continue to work on manual therapy/modalities as needed to reduce pain; gait training with cane if needed; gentle strengthening exercises; postural exercises (to encourage pt to avoid hyperextension at knees)  Total Treatment Duration:  PT Patient Time In/Time Out  Time In: 0930  Time Out: [id number] [address], [facility]

## 2018-05-02 ENCOUNTER — HOSPITAL ENCOUNTER (OUTPATIENT)
Dept: PHYSICAL THERAPY | Age: 58
Discharge: HOME | End: 2018-05-02
Attending: FAMILY MEDICINE
Payer: COMMERCIAL

## 2018-05-02 PROCEDURE — 97110 THERAPEUTIC EXERCISES: CPT

## 2018-05-02 PROCEDURE — 97140 MANUAL THERAPY 1/> REGIONS: CPT

## 2018-05-02 NOTE — PROGRESS NOTES
Bharat Hicks  : 1960  Primary: [address] [facility] [facility]  [address] [facility], [address] [place] [address] Drive, [place], [address] [facility]  [phone]   [phone]       OUTPATIENT PHYSICAL THERAPY:Progress Report 2018    ICD-10: Treatment Diagnosis: Difficulty in walking, not elsewhere classified (R26.2)   Unsteadiness on feet (R26.81)   Pain in right knee (M25.561)       Precautions/Allergies:   Codeine and Meperidine   Fall Risk Score: 6 (? 5 = High Risk)  MD Orders: Evaluate and treat MEDICAL/REFERRING DIAGNOSIS:  Acute pain of right knee [M25.561]   DATE OF ONSET: 2017  REFERRING PHYSICIAN: Braulio Delcid.,*  RETURN PHYSICIAN APPOINTMENT: unspecified   This section established at most recent assessment  INITIAL ASSESSMENT:  Bharat Hicks has now attended 7 physical therapy sessions for insidious onset of R knee pain beginning in 2017. This session, she demonstrated improved R LE strength/endurance, improved R knee mobility with transfers, less postural/gait deficits, and improved functional mobility as evident by a score of 34/80 on the Lower Extremity Functional Scale (initial score of 6/80, with lower scores indicating increased disability). Despite the above listed improvements, she continues to demonstrate decreased R LE strength/endurance, significantly increased pain with all R knee mobility, multiple postural/gait deficits, and decreased functional mobility. Pt may continue to benefit from skilled PT to address the above listed deficits to improve ability to perform pain-free ADLs/IADLs and to improve overall quality of life prior to discharge. PROBLEM LIST (Impacting functional limitations):  1. Decreased Strength  2. Decreased ADL/Functional Activities  3. Decreased Transfer Abilities  4. Decreased Ambulation Ability/Technique  5. Decreased Balance  6. Increased Pain  7.  Decreased Activity Tolerance  8. Decreased Flexibility/Joint Mobility  9. Edema/Girth INTERVENTIONS PLANNED:  1. Balance Exercise  2. Bed Mobility  3. Cold  4. Family Education  5. Gait Training  6. Heat  7. Home Exercise Program (HEP)  8. Manual Therapy  9. Neuromuscular Re-education/Strengthening  10. Range of Motion (ROM)  11. Therapeutic Activites  12. Therapeutic Exercise/Strengthening  13. Transfer Training  14. Ultrasound (US)   TREATMENT PLAN:  Effective Dates: 4/4/2018 TO 7/3/2018 (90 days). Frequency/Duration: 2 times a week for 90 Days  GOALS: (Goals have been discussed and agreed upon with patient.)  Short-Term Functional Goals: Time Frame: 45 days  1. Pt will be compliant with HEP in order to increase LE strength/endurance/mobility to improve functional mobility and overall quality of life. (MET 5/2/2018)  2. Pt will improve score on the Lower Extremity Functional Scale to 25/80 in order to demonstrate improved functional mobility and quality of life. (MET 5/2/2018)  3. Pt will improve score on Timed Up and Go Test to 11 seconds with the least restrictive assistive device in order to decrease fall risk and increase safety and independence during ambulation. (MET 5/2/2018)  4. Pt will be able to ascend/descend 4 steps with S or without rail with reciprocal or step to gait pattern in order to improve community mobility. (PROGRESSING 5/2/2018)  Discharge Goals: Time Frame: 90 days  1. Pt will be independent with HEP in order to increase LE strength/endurance/mobility to improve functional mobility and overall quality of life. 2. Pt will improve score on the Lower Extremity Functional Scale to 45/80 in order to demonstrate improved functional mobility and quality of life. 3. Pt will improve score on Timed Up and Go Test to 9 seconds with the least restrictive assistive device in order to decrease fall risk and increase safety and independence during ambulation.   4. Pt will be able to ascend/descend 6 steps independently without rail with reciprocal or step to gait pattern in order to improve community mobility. Rehabilitation Potential For Stated Goals: Good  Regarding Ambrosio Chavez therapy, I certify that the treatment plan above will be carried out by a therapist or under their direction. Thank you for this referral,  Miguel Culp, DPT     Referring Physician Signature: Ct Pineda.,*              Date                    HISTORY:   History of Present Injury/Illness (Reason for Referral): *History per pt or pt's family except where otherwise noted  Pt states that her knee has been hurting since about November 2017 (states she woke up with it hurting, not sure what caused it to start). States she usually sleeps in recliner with her legs elevated (states this is due to asthma), and when she was sleeping on her side one night, she heard her R knee \"pop,\" and it has been hurting ever since. She states there is an intense burning pain in her R anterior knee surrounding the patella along the joint line (does not radiate per pt report). She states some days the pain is worse than others. She states it feels like it is going to give out (and it has given out on her sometimes), she has difficulty walking immediately after standing, and she has significant difficulty going up/down steps. Pain at worst is 8/10 (aggravating activities include straightening her R knee after having it flexed for a long time, walking/standing > 15 minutes, trying to get up from stooped position). Pain at best is 5/10 (eases pain by trying to keep her R leg elevated and extended, avoiding weightbearing activities, IcyHot, wearing copper sleeve). During the past month, she has fallen twice due to her R leg giving out (states that she has also passed out before due to low potassium levels). Pt states she does not smoke. Past Medical History/Comorbidities:   Ms. Mariangel Townsend  has a past medical history of Anxiety; Arrhythmia;  Asthma (2/4/2010); Depression; Diabetes mellitus type 2, controlled, without complications ([facility] [address] Dyslipidemia (2010); Hypertension; Migraines; SVT (supraventricular tachycardia) ([facility] [address] and Vitamin D deficiency. Ms. Blanka Juárez  has a past surgical history that includes hx  section (); pr abdomen surgery proc unlisted; hx tonsillectomy (as child); hx tubal ligation (Bilateral); hx kash and bso (); and hx lap cholecystectomy (). 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 Current Medications:    Current Outpatient Prescriptions:     atorvastatin (LIPITOR) 20 mg tablet, Take 1 Tab by mouth daily. , Disp: 90 Tab, Rfl: 1    losartan-hydroCHLOROthiazide (HYZAAR) 100-25 mg per tablet, Take 1 Tab by mouth daily. , Disp: 90 Tab, Rfl: 1    potassium chloride SA (MICRO-K) 10 mEq capsule, Take 1 Cap by mouth two (2) times a day., Disp: 180 Cap, Rfl: 1    fluticasone-salmeterol (ADVAIR DISKUS) 250-50 mcg/dose diskus inhaler, Take 1 Puff by inhalation every twelve (12) hours. , Disp: 1 Inhaler, Rfl: 12    albuterol (VENTOLIN HFA) 90 mcg/actuation inhaler, Take 2 Puffs by inhalation every four (4) hours as needed for Wheezing., Disp: 1 Inhaler, Rfl: 11    omega-3 acid ethyl esters (LOVAZA) 1 gram capsule, Take 2 Caps by mouth two (2) times a day., Disp: 120 Cap, Rfl: 5    dapagliflozin (FARXIGA) 10 mg tab tablet, Take 1 Tab by mouth daily. , Disp: 90 Tab, Rfl: 1    SITagliptin (JANUVIA) 100 mg tablet, Take 1 Tab by mouth daily. , Disp: 90 Tab, Rfl: 1    meloxicam (MOBIC) 7.5 mg tablet, Take 1 Tab by mouth daily. , Disp: 30 Tab, Rfl: 2    amLODIPine (NORVASC) 5 mg tablet, Take 1 Tab by mouth daily. , Disp: 30 Tab, Rfl: 1    levalbuterol (XOPENEX) 1.25 mg/3 mL nebu, 3 mL by Nebulization route every four (4) hours as needed. , Disp: 30 Nebule, Rfl: 11    meclizine (ANTIVERT) 25 mg tablet, Take 1 Tab by mouth three (3) times daily as needed. , Disp: 30 Tab, Rfl: 2    ondansetron (ZOFRAN ODT) 4 mg disintegrating tablet, Take 1 tablet by mouth every eight (8) hours as needed. , Disp: 20 tablet, Rfl: 1    promethazine (PHENERGAN) 25 mg tablet, Take 1 tablet by mouth every six (6) hours as needed. , Disp: 12 tablet, Rfl: 0   Date Last Reviewed:  5/2/2018   # of Personal Factors/Comorbidities that affect the Plan of Care: 1-2: MODERATE COMPLEXITY   EXAMINATION:   Reassessment above on 5/2/2018  Observation/Orthostatic Postural Assessment:          Kyphotic posture, forward head, rounded shoulders, posterior pelvic tilt in sitting, R knee extended in sitting - improved upright posture with increased R knee flexion in sitting on 5/2/2018  Palpation:          Significant pain with R patellar mobilizations (grade 2-3) medially/laterally and inferiorly/superiorly, significant pain with overpressure in R knee extension  ROM:    Initial measurement: (on 4/4/2018) Initial measurement: (on 4/4/2018) Reassessment measurement:  Reassessment measurement:    L LE: WFL throughout hip, knee and ankle (SLR to 90) R LE: WFL throughout, but significant pain throughout knee flexion, especially at end range, eased off slightly with movement into knee extension (but pain with overpressure into knee extension)         Strength:    Initial measurement: (on 4/4/2018) Initial measurement: (on 4/4/2018) Reassessment measurement:  Reassessment measurement:    L LE:  Hip flexion: 4+/5  Hip abduction: 4/5 (pain in R knee)  Hip adduction: 3+/5  Hip IR: 4+/5  Hip ER: 4/5  Knee extension: 4+/5 R LE:  Hip flexion: 4-/5 (pain in anterior R knee)  Hip abduction: 4-/5 (pain in anterior R knee along joint line)  Hip adduction: 3+/5 (pain in anterior R knee along joint line)  Hip IR: 4/5 (pain in anterior R knee along joint line)  Hip ER: 4-/5 (pain in anterior R knee along joint line)  Knee extension: 4-/5 (pain in anterior R knee along joint line)       Special Tests:          Valgus/varus stress test: - at 0 and 30 degrees  McConnel's test for patellar tracking: -  Did not attempt Pranav's test for meniscus due to pt's high guarding and resultant pain with any knee movement  Neurological Screen:        Myotomes:  WFL        Dermatomes:  Pt states she has peripheral neuropathy  Functional Mobility:         Gait/Ambulation:  Pt ambulates with no AD with following gait deficits: decreased R stance time, decreased B step length/clearance, increased lateral sway - less lateral sway and improved symmetry of stance time on 5/2/2018        Transfers:  [place]; uses hands to push up from/reach back for chair/mat; maintains R knee extension throughout transfer - pt able to perform sit to stand to sit transfer today with symmetrical LEs (R knee at 90 degrees flexion)        Bed Mobility:  [place]; increased time  Balance:          Fair to good (increased lateral sway during ambulation)   Body Structures Involved:  1. Nerves  2. Bones  3. Joints  4. Muscles  5. Ligaments Body Functions Affected:  1. Sensory/Pain  2. Neuromusculoskeletal  3. Movement Related Activities and Participation Affected:  1. General Tasks and Demands  2. Mobility  3. Self Care  4. Domestic Life  5. Interpersonal Interactions and Relationships  6.  Community, Social and Letcher Ramsey   # of elements that affect the Plan of Care: 4+: HIGH COMPLEXITY   CLINICAL PRESENTATION:   Presentation: Evolving clinical presentation with changing clinical characteristics: [place] [address] MAKING:   Outcome Measure: Tool Used: Lower Extremity Functional Scale (LEFS)  Score:  Initial: 6/80 Most Recent: 34/80 (Date: 5/2/2018 )   Interpretation of Score: 20 questions each scored on a 5 point scale with 0 representing \"extreme difficulty or unable to perform\" and 4 representing \"no difficulty\". The lower the score, the greater the functional disability. 80/80 represents no disability. Minimal detectable change is 9 points. Score 80 79-63 62-48 47-32 31-16 15-1 0   Modifier CH CI CJ CK CL CM CN     Tool Used: Timed Up and Go (TUG)  Score:  Initial: 13 seconds no AD Most Recent: 10 seconds (Date: 5/2/2018 )   Interpretation of Score: The test measures, in seconds, the time taken by an individual to stand up from a standard arm chair (seat height 46 cm [18 in], arm height 65 cm [25.6 in]), walk a distance of 3 meters (118 in, approx 10 ft), turn, walk back to the chair and sit down. If the individual takes longer than 14 seconds to complete TUG, this indicates risk for falls. Score 7 7.5-10.5 11-14 14.5-17.5 18-21 21.5-24.5 25+   Modifier CH CI CJ CK CL CM CN     Payor: [employer] / Plan: Maurizio Poster / Product Type: PPO /   Medical Necessity:   · Patient is expected to demonstrate progress in strength, range of motion, balance and functional technique to improve ability to perform pain-free standing/ambulation and to improve overall quality of life. · Patient demonstrates good rehab potential due to higher previous functional level. Reason for Services/Other Comments:  · Patient continues to require modification of therapeutic interventions to increase complexity of exercises.    Use of outcome tool(s) and clinical judgement create a POC that gives a: Questionable prediction of patient's progress: MODERATE COMPLEXITY   TREATMENT:   (In addition to Assessment/Re-Assessment sessions the following treatments were rendered)  Subjective comments at beginning of session: Pt reports that yesterday her knee was hurting slightly (she had replaced the tape we had put on it last session, and felt like she positioned the tape wrong), but it is feeling better today since she adjusted the tape again to make it feel better; went to see MD yesterday and he told her to continue therapy     Therapeutic Exercise: ( 23 minutes):  Exercises per grid below (and assessment above on 5/2/2018) to improve mobility, strength, balance and dynamic movement of knee - right to improve functional mobility. Required minimal visual, verbal and manual cues to promote proper body alignment, promote proper body posture and promote proper body mechanics. Progressed resistance, range and repetitions as indicated.     Date:  4/25/2018 Date:  4/30/2018 Date:  5/2/2018   Activity/Exercise      Nustep L3 resistance for 7 minutes with B LEs and UEs to increase blood flow and knee mobility L3 resistance for 10 minutes with B LEs and UEs to increase blood flow and knee mobility L3 resistance for 10 minutes with B LEs and UEs to increase blood flow and knee mobility   Hamstring stretch 3 x 30 second hold right LE 3 x 30 second hold right LE 3 x 30 second hold right LE passive stretch by therapist   Side lying TFL stretch      Prone quad stretch      Piriformis stretch      SLR in supine* 10 reps/2 sets R LE - pt had difficulty performing due to back pain even with cues for posterior pelvic tilt and core activation - cues to avoid complete extension of knee to reduce pain 10 reps/2 sets R LE - pt had difficulty performing due to back pain even with cues for posterior pelvic tilt and core activation - cues to avoid complete extension of knee to reduce pain 10 reps/2 sets R LE with cues to maintain knee extension   SAQ in supine* 20 reps/1 set R LE then L LE 20 reps/1 set R LE then L LE 20 reps/1 set R LE then L LE   Incline board for ankle plantarflexors  30 second hold x 3 reps with knees slightly flexed (attempted with knees extended but discontinued due to pain in anterior R knee) 30 second hold x 3 reps with knees extended then flexed 30 second hold x 3 reps with knees extended then flexed                     *given in HEP    MANUAL THERAPY: (17 minutes): Joint mobilization, Soft tissue mobilization and Manual lymphatic drainage was utilized and necessary because of the patient's restricted joint motion, painful spasm and restricted motion of soft tissue. With pt in supported supine position with R LE supported, pulsed ultrasound performed (3 MHz, 1.3 W/cm2, 20%) to medial R knee to reduce inflammation and pain. With pt in same position, kinesiotape applied to medial/anterior R knee with 25% stretch to reduce pressure at medial knee and reduce pain and inflammation. Modalities () Pt states she will ice at home    Treatment/Session Assessment:  See assessment above. · Pain/ Symptoms: Initial:   0/10 in R knee Post Session:  No change in pain reported ·   Compliance with Program/Exercises: Good compliance with attending scheduled sessions  · Recommendations/Intent for next treatment session: \"Next visit will focus on advancements to more challenging activities and reduction in assistance provided\".  Continue to work on manual therapy/modalities as needed to reduce pain; gait training with cane if needed; gentle strengthening exercises; postural exercises (to encourage pt to avoid hyperextension at knees)  Total Treatment Duration:  PT Patient Time In/Time Out  Time In: 0930  Time Out: 1 Raymundo Maddox DPT

## 2018-05-07 ENCOUNTER — HOSPITAL ENCOUNTER (OUTPATIENT)
Dept: PHYSICAL THERAPY | Age: 58
Discharge: HOME | End: 2018-05-07
Attending: FAMILY MEDICINE
Payer: COMMERCIAL

## 2018-05-07 NOTE — PROGRESS NOTES
Therapy Center at [address], [place], [facility]  [phone]   [phone]     OUTPATIENT DAILY NOTE    NAME: Babak Salinas    DATE: 5/7/2018    Patient canceled physical therapy appointment today due to just having worked a 16 hour shift. Will plan to follow up on next scheduled visit.     TREVOR JimenezT

## 2018-05-08 NOTE — PROGRESS NOTES
Discharge Instructions for Open Rotator Cuff Repair  You had a procedure called open rotator cuff repair. The rotator cuff consists of the muscles and tendons that surround your shoulder. The rotator cuff keeps the top of your upper arm bone (humerus) securely in the shoulder joint. Your doctor made an incision near your shoulder blade and repaired the torn muscles or tendons in your shoulder. Here are instructions to follow when caring for your arm at home.  Activity        · After surgery, rest your arm and relax for the rest of day.  · If you had general anesthesia (were put to sleep for the procedure), dont operate power tools or machinery, drink alcohol, or make any major decisions for at least 24 hours after surgery.  · Wear your sling, brace, or immobilizer, as directed.  · Dont drive a car until your doctor says its OK. And never drive while taking opioid pain medicine.  · Flex your wrist and wiggle your fingers often to help blood flow.  Incision care  · Check your incision daily for redness, tenderness, or drainage.  · Dont soak in a bathtub, hot tub, or pool until your doctor says its OK.  · Wait several days after your surgery to start showering, or until your doctor says it's OK. Then shower as needed. Carefully wash your incision with soap and water. Gently pat it dry. Dont rub the incision, or apply creams or lotions.  · Your incision was closed using sutures, staples, or strips of tape. If you have sutures or staples, they may need to be removed up to 2 to 3 weeks after surgery. Allow the strips of tape to fall off on their own.  Home care  · Use pain medicine as directed by your doctor.  · Apply an ice pack or bag of frozen peas--or something similar--wrapped in a thin towel on your shoulder to reduce swelling for the first 48 hours. Leave the ice pack on for 20 minutes; then take it off for 20 minutes. Repeat as needed.  · Take your temperature daily for 7 days after your surgery. Report a  Resolving current episode for case management due to patient unable to reach. Patient has not been reached after repeated calls and letters. Letter sent to patient notifying completion of services due to unable to reach. This writer's contact information and information regarding program services included in materials sent. fever above 100.4°F (38°C)  to your doctor. Fever may be a sign of infection.  Follow-up care  Follow up with your healthcare provider, or as advised.      When to call your healthcare provider  Call 911 right away if you have any of the following:  · Chest pain  · Shortness of breath  Otherwise, call your healthcare provider right away if you have any of these:  · Increasing shoulder pain or pain not relieved by medicine  · Pain or swelling in the arm on the side of your surgery  · Numbness, tingling, coolness, or blue-gray color of your arm or fingers on the side of your surgery  · Fever above 100.4°F (38°C), or as advised  · Shaking chills  · Drainage or oozing, redness, or warmth at the incision  · Nausea or vomiting   Date Last Reviewed: 7/1/2016 © 2000-2017 [employer]. [address] [place] [id number]. All rights reserved. This information is not intended as a substitute for professional medical care. Always follow your healthcare professional's instructions.        General Information:    1.  Do not drink alcoholic beverages including beer for 24 hours or as long as you are on pain medication..  2.  Do not drive a motor vehicle, operate machinery or power tools, or signs legal papers for 24 hours or as long as you are on pain medication.   3.  You may experience light-headedness, dizziness, and sleepiness following surgery. Please do not stay alone. A responsible adult should be with you for this 24 hour period.  4.  Go home and rest.    5. Progress slowly to a normal diet unless instructed.  Otherwise, begin with liquids such as soft drinks, then soup and crackers working up to solid foods. Drink plenty of nonalcoholic fluids.  6.  Certain anesthetics and pain medications produce nausea and vomiting in certain       individuals. If nausea becomes a problem at home, call you doctor.    7. A nurse will be calling you sometime after surgery. Do not be alarmed. This is our way of finding  out how you are doing.    8. Several times every hour while you are awake, take 2-3 deep breaths and cough. If you had stomach surgery hold a pillow or rolled towel firmly against your stomach before you cough. This will help with any pain the cough might cause.  9. Several times every hour while you are awake, pump and flex your feet 5-6 times and do foot circles. This will help prevent blood clots.    10.Call your doctor for severe pain, bleeding, fever, or signs or symptoms of infection (pain, swelling, redness, foul odor, drainage).    Post op instructions for prevention of DVT  What is deep vein thrombosis?  Deep vein thrombosis (DVT) is the medical term for blood clots in the deep veins of the leg.  These blood clots can be dangerous.  A DVT can block a blood vessel and keep blood from getting where it needs to go.  Another problem is that the clot can travel to other parts of the body such as the lungs.  A clot that travels to the lungs is called a pulmonary embolus (PE) and can cause serious problems with breathing which can lead to death.  Am I at risk for DVT/PE?  If you are not very active, you are at risk of DVT.  Anyone confined to bed, sitting for long periods of time, recovering from surgery, etc. increases the risk of DVT.  Other risk factors are cancer diagnosis, certain medications, estrogen replacement in any form,older age, obesity, pregnancy, smoking, history of clotting disorders, and dehydration.  How will I know if I have a DVT?   Swelling in the lower leg   Pain   Warmth, redness, hardness or bulging of the vein  If you have any of these symptoms, call your doctors office right away.  Some people will not have any symptoms until the clot moves to the lungs.  What are the symptoms of a PE?   Panting, shortness of breath, or trouble breathing   Sharp, knife-like chest pain when you breathe   Coughing or coughing up blood   Rapid heartbeat  If you have any of these symptoms or get worse  quickly, call 911 for emergency treatment.  How can I prevent a DVT?   Avoid long periods of inactivity and dont cross your legs--get up and walk around every hour or so.   Stay active--walking after surgery is highly encouraged.  This means you should get out of the house and walk in the neighborhood.  Going up and down stairs will not impair healing (unless advised against such activity by your doctor).     Drink plenty of noncaffeinated, nonalcoholic fluids each day to prevent dehydration.   Wear special support stockings, if they have been advised by your doctor.   If you travel, stop at least once an hour and walk around.   Avoid smoking (assistance with stopping is available through your healthcare provider)  Always notify your doctor if you are not able to follow the post operative instructions that are given to you at the time of discharge.  It may be necessary to prescribe one of the medications available to prevent DVT.    Discharge Instructions: After Your Surgery/Procedure  Youve just had surgery. During surgery you were given medicine called anesthesia to keep you relaxed and free of pain. After surgery you may have some pain or nausea. This is common. Here are some tips for feeling better and getting well after surgery.     Stay on schedule with your medication.   Going home  Your doctor or nurse will show you how to take care of yourself when you go home. He or she will also answer your questions. Have an adult family member or friend drive you home.      For your safety we recommend these precaution for the first 24 hours after your procedure:  · Do not drive or use heavy equipment.  · Do not make important decisions or sign legal papers.  · Do not drink alcohol.  · Have someone stay with you, if needed. He or she can watch for problems and help keep you safe.  · Your concentration, balance, coordination, and judgement may be impaired for many hours after anesthesia.  Use caution when  "ambulating or standing up.     · You may feel weak and "washed out" after anesthesia and surgery.      Subtle residual effects of general anesthesia or sedation with regional / local anesthesia can last more than 24 hours.  Rest for the remainder of the day or longer if your Doctor/Surgeon has advised you to do so.  Although you may feel normal within the first 24 hours, your reflexes and mental ability may be impaired without you realizing it.  You may feel dizzy, lightheaded or sleepy for 24 hours or longer.      Be sure to go to all follow-up visits with your doctor. And rest after your surgery for as long as your doctor tells you to.  Coping with pain  If you have pain after surgery, pain medicine will help you feel better. Take it as told, before pain becomes severe. Also, ask your doctor or pharmacist about other ways to control pain. This might be with heat, ice, or relaxation. And follow any other instructions your surgeon or nurse gives you.  Tips for taking pain medicine  To get the best relief possible, remember these points:  · Pain medicines can upset your stomach. Taking them with a little food may help.  · Most pain relievers taken by mouth need at least 20 to 30 minutes to start to work.  · Taking medicine on a schedule can help you remember to take it. Try to time your medicine so that you can take it before starting an activity. This might be before you get dressed, go for a walk, or sit down for dinner.  · Constipation is a common side effect of pain medicines. Call your doctor before taking any medicines such as laxatives or stool softeners to help ease constipation. Also ask if you should skip any foods. Drinking lots of fluids and eating foods such as fruits and vegetables that are high in fiber can also help. Remember, do not take laxatives unless your surgeon has prescribed them.  · Drinking alcohol and taking pain medicine can cause dizziness and slow your breathing. It can even be deadly. " Do not drink alcohol while taking pain medicine.  · Pain medicine can make you react more slowly to things. Do not drive or run machinery while taking pain medicine.  Your health care provider may tell you to take acetaminophen to help ease your pain. Ask him or her how much you are supposed to take each day. Acetaminophen or other pain relievers may interact with your prescription medicines or other over-the-counter (OTC) drugs. Some prescription medicines have acetaminophen and other ingredients. Using both prescription and OTC acetaminophen for pain can cause you to overdose. Read the labels on your OTC medicines with care. This will help you to clearly know the list of ingredients, how much to take, and any warnings. It may also help you not take too much acetaminophen. If you have questions or do not understand the information, ask your pharmacist or health care provider to explain it to you before you take the OTC medicine.  Managing nausea  Some people have an upset stomach after surgery. This is often because of anesthesia, pain, or pain medicine, or the stress of surgery. These tips will help you handle nausea and eat healthy foods as you get better. If you were on a special food plan before surgery, ask your doctor if you should follow it while you get better. These tips may help:  · Do not push yourself to eat. Your body will tell you when to eat and how much.  · Start off with clear liquids and soup. They are easier to digest.  · Next try semi-solid foods, such as mashed potatoes, applesauce, and gelatin, as you feel ready.  · Slowly move to solid foods. Dont eat fatty, rich, or spicy foods at first.  · Do not force yourself to have 3 large meals a day. Instead eat smaller amounts more often.  · Take pain medicines with a small amount of solid food, such as crackers or toast, to avoid nausea.     Call your surgeon if  · You still have pain an hour after taking medicine. The medicine may not be strong  enough.  · You feel too sleepy, dizzy, or groggy. The medicine may be too strong.  · You have side effects like nausea, vomiting, or skin changes, such as rash, itching, or hives.       If you have obstructive sleep apnea  You were given anesthesia medicine during surgery to keep you comfortable and free of pain. After surgery, you may have more apnea spells because of this medicine and other medicines you were given. The spells may last longer than usual.   At home:  · Keep using the continuous positive airway pressure (CPAP) device when you sleep. Unless your health care provider tells you not to, use it when you sleep, day or night. CPAP is a common device used to treat obstructive sleep apnea.  · Talk with your provider before taking any pain medicine, muscle relaxants, or sedatives. Your provider will tell you about the possible dangers of taking these medicines.  © 3823-9261 [employer]. [address] [place] [address]. All rights reserved. This information is not intended as a substitute for professional medical care. Always follow your healthcare professional's instructions.    Using Opioids for Pain Management     Your doctor has given instructions for you to take an opioid.  This is a drug for bad pain.  It helps control pain without causing bleeding and kidney problems.  Common opioid names are morphine, hydromorphone, oxycodone, and methadone. These drugs are called narcotics.    There are several safety concerns you need to know.     · It is against the law to give or sell this drug to another person.  You must keep this medicine safely locked.    · You may have side effects from taking this medication.  These include nausea, itching, sweating, sleepiness, a change in your ability to breathe, and depression.  · Do not take alcohol or sleeping pills opioids.    · Long-term opoid use may no longer giver you relief from pain.  It can cause you stomach pain, mental anxiety, and  headaches.  Long-term opoid use can potentially lead to unlawful street drug abuse and reduce your ability to stay employed.    · Your body may become opioid tolerant if you need to take more to get relief.    · You must stop taking opioids if you begin having more pain as a result of the medicine.    · Opioid withdrawal occurs when you have to stop taking the drug.  It can cause you to have nausea, vomiting, diarrhea, stomach pain, anxiety, and dilated pupils in your eyes. This condition means you are opioid dependent.    · Addiction is a drug induced brain disease. It means there are changes in how your brain is working.  Children, teens, and young adults under 25 years old are more likely to get addicted to opioids.      · Addiction can happen with repeated opioid use.  It does not happen with short-term use of two weeks or less.       For more information, please speak with your doctor or pharmacist.

## 2018-05-09 ENCOUNTER — HOSPITAL ENCOUNTER (OUTPATIENT)
Dept: PHYSICAL THERAPY | Age: 58
Discharge: HOME | End: 2018-05-09
Attending: FAMILY MEDICINE
Payer: COMMERCIAL

## 2018-05-09 NOTE — PROGRESS NOTES
[facility] at [address]  [phone]   [phone]     OUTPATIENT DAILY NOTE    NAME: Saturnino Ovalle    DATE: 5/9/2018    Patient canceled physical therapy appointment today. Will plan to follow up on next scheduled visit.     TREOVR CoronadoT

## 2018-05-11 ENCOUNTER — PATIENT OUTREACH (OUTPATIENT)
Dept: OTHER | Age: 58
End: 2018-05-11

## 2018-05-11 NOTE — PROGRESS NOTES
Telephonic outreach attempt to follow up with patient to address concerns that were discussed in the previous conversation and to discuss recent PCP visit. Left a discreet VM. Will continue attempts to contact patient.

## 2018-05-15 ENCOUNTER — HOSPITAL ENCOUNTER (OUTPATIENT)
Dept: PHYSICAL THERAPY | Age: 58
Discharge: HOME | End: 2018-05-15
Attending: FAMILY MEDICINE
Payer: COMMERCIAL

## 2018-05-15 PROCEDURE — 97110 THERAPEUTIC EXERCISES: CPT

## 2018-05-15 PROCEDURE — 97140 MANUAL THERAPY 1/> REGIONS: CPT

## 2018-05-15 NOTE — PROGRESS NOTES
Manuel Mendes  : 1960  Primary: [facility]  [address], [address], [place], [address] [facility]  [phone]   [phone]       OUTPATIENT PHYSICAL [address] Note 5/15/2018    ICD-10: Treatment Diagnosis: Difficulty in walking, not elsewhere classified (R26.2)   Unsteadiness on feet (R26.81)   Pain in right knee (M25.561)       Precautions/Allergies:   Codeine and Meperidine   Fall Risk Score: 6 (? 5 = High Risk)  MD Orders: Evaluate and treat MEDICAL/REFERRING DIAGNOSIS:  Acute pain of right knee [M25.561]   DATE OF ONSET: 2017  REFERRING PHYSICIAN: Santino Osborn,*  RETURN PHYSICIAN APPOINTMENT: unspecified   This section established at most recent assessment  INITIAL ASSESSMENT:  Manuel Mendes has now attended 7 physical therapy sessions for insidious onset of R knee pain beginning in 2017. This session, she demonstrated improved R LE strength/endurance, improved R knee mobility with transfers, less postural/gait deficits, and improved functional mobility as evident by a score of 34/80 on the Lower Extremity Functional Scale (initial score of 6/80, with lower scores indicating increased disability). Despite the above listed improvements, she continues to demonstrate decreased R LE strength/endurance, significantly increased pain with all R knee mobility, multiple postural/gait deficits, and decreased functional mobility. Pt may continue to benefit from skilled PT to address the above listed deficits to improve ability to perform pain-free ADLs/IADLs and to improve overall quality of life prior to discharge. PROBLEM LIST (Impacting functional limitations):  1. Decreased Strength  2. Decreased ADL/Functional Activities  3. Decreased Transfer Abilities  4. Decreased Ambulation Ability/Technique  5. Decreased Balance  6. Increased Pain  7.  Decreased Activity Tolerance  8. Decreased Flexibility/Joint Mobility  9. Edema/Girth INTERVENTIONS PLANNED:  1. Balance Exercise  2. Bed Mobility  3. Cold  4. Family Education  5. Gait Training  6. Heat  7. Home Exercise Program (HEP)  8. Manual Therapy  9. Neuromuscular Re-education/Strengthening  10. Range of Motion (ROM)  11. Therapeutic Activites  12. Therapeutic Exercise/Strengthening  13. Transfer Training  14. Ultrasound (US)   TREATMENT PLAN:  Effective Dates: 4/4/2018 TO 7/3/2018 (90 days). Frequency/Duration: 2 times a week for 90 Days  GOALS: (Goals have been discussed and agreed upon with patient.)  Short-Term Functional Goals: Time Frame: 45 days  1. Pt will be compliant with HEP in order to increase LE strength/endurance/mobility to improve functional mobility and overall quality of life. (MET 5/2/2018)  2. Pt will improve score on the Lower Extremity Functional Scale to 25/80 in order to demonstrate improved functional mobility and quality of life. (MET 5/2/2018)  3. Pt will improve score on Timed Up and Go Test to 11 seconds with the least restrictive assistive device in order to decrease fall risk and increase safety and independence during ambulation. (MET 5/2/2018)  4. Pt will be able to ascend/descend 4 steps with S or without rail with reciprocal or step to gait pattern in order to improve community mobility. (PROGRESSING 5/2/2018)  Discharge Goals: Time Frame: 90 days  1. Pt will be independent with HEP in order to increase LE strength/endurance/mobility to improve functional mobility and overall quality of life. 2. Pt will improve score on the Lower Extremity Functional Scale to 45/80 in order to demonstrate improved functional mobility and quality of life. 3. Pt will improve score on Timed Up and Go Test to 9 seconds with the least restrictive assistive device in order to decrease fall risk and increase safety and independence during ambulation.   4. Pt will be able to ascend/descend 6 steps independently without rail with reciprocal or step to gait pattern in order to improve community mobility. Rehabilitation Potential For Stated Goals: Good  Regarding Donna Pleasure therapy, I certify that the treatment plan above will be carried out by a therapist or under their direction. Thank you for this referral,  Christopher Gotti DPT     Referring Physician Signature: Aleyda Kd.,*              Date                    HISTORY:    History of Present Injury/Illness (Reason for Referral): *History per pt or pt's family except where otherwise noted  Pt states that her knee has been hurting since about November 2017 (states she woke up with it hurting, not sure what caused it to start). States she usually sleeps in recliner with her legs elevated (states this is due to asthma), and when she was sleeping on her side one night, she heard her R knee \"pop,\" and it has been hurting ever since. She states there is an intense burning pain in her R anterior knee surrounding the patella along the joint line (does not radiate per pt report). She states some days the pain is worse than others. She states it feels like it is going to give out (and it has given out on her sometimes), she has difficulty walking immediately after standing, and she has significant difficulty going up/down steps. Pain at worst is 8/10 (aggravating activities include straightening her R knee after having it flexed for a long time, walking/standing > 15 minutes, trying to get up from stooped position). Pain at best is 5/10 (eases pain by trying to keep her R leg elevated and extended, avoiding weightbearing activities, IcyHot, wearing copper sleeve). During the past month, she has fallen twice due to her R leg giving out (states that she has also passed out before due to low potassium levels). Pt states she does not smoke. Past Medical History/Comorbidities:   Ms. Da Rabago  has a past medical history of Anxiety; Arrhythmia;  Asthma (2/4/2010); Depression; Diabetes mellitus type 2, controlled, without complications ([facility] [address] Dyslipidemia (2010); Hypertension; Migraines; SVT (supraventricular tachycardia) ([facility] [address] and Vitamin D deficiency. Ms. Shameka Ding  has a past surgical history that includes hx  section (); pr abdomen surgery proc unlisted; hx tonsillectomy (as child); hx tubal ligation (Bilateral); hx kash and bso (); and hx lap cholecystectomy (). 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 Current Medications:    Current Outpatient Prescriptions:     atorvastatin (LIPITOR) 20 mg tablet, Take 1 Tab by mouth daily. , Disp: 90 Tab, Rfl: 1    losartan-hydroCHLOROthiazide (HYZAAR) 100-25 mg per tablet, Take 1 Tab by mouth daily. , Disp: 90 Tab, Rfl: 1    potassium chloride SA (MICRO-K) 10 mEq capsule, Take 1 Cap by mouth two (2) times a day., Disp: 180 Cap, Rfl: 1    fluticasone-salmeterol (ADVAIR DISKUS) 250-50 mcg/dose diskus inhaler, Take 1 Puff by inhalation every twelve (12) hours. , Disp: 1 Inhaler, Rfl: 12    albuterol (VENTOLIN HFA) 90 mcg/actuation inhaler, Take 2 Puffs by inhalation every four (4) hours as needed for Wheezing., Disp: 1 Inhaler, Rfl: 11    omega-3 acid ethyl esters (LOVAZA) 1 gram capsule, Take 2 Caps by mouth two (2) times a day., Disp: 120 Cap, Rfl: 5    dapagliflozin (FARXIGA) 10 mg tab tablet, Take 1 Tab by mouth daily. , Disp: 90 Tab, Rfl: 1    SITagliptin (JANUVIA) 100 mg tablet, Take 1 Tab by mouth daily. , Disp: 90 Tab, Rfl: 1    meloxicam (MOBIC) 7.5 mg tablet, Take 1 Tab by mouth daily. , Disp: 30 Tab, Rfl: 2    amLODIPine (NORVASC) 5 mg tablet, Take 1 Tab by mouth daily. , Disp: 30 Tab, Rfl: 1    levalbuterol (XOPENEX) 1.25 mg/3 mL nebu, 3 mL by Nebulization route every four (4) hours as needed. , Disp: 30 Nebule, Rfl: 11    meclizine (ANTIVERT) 25 mg tablet, Take 1 Tab by mouth three (3) times daily as needed. , Disp: 30 Tab, Rfl: 2    ondansetron (ZOFRAN ODT) 4 mg disintegrating tablet, Take 1 tablet by mouth every eight (8) hours as needed. , Disp: 20 tablet, Rfl: 1    promethazine (PHENERGAN) 25 mg tablet, Take 1 tablet by mouth every six (6) hours as needed. , Disp: 12 tablet, Rfl: 0   Date Last Reviewed:  5/15/2018    # of Personal Factors/Comorbidities that affect the Plan of Care:    EXAMINATION:    Reassessment above on 5/2/2018  Observation/Orthostatic Postural Assessment:          Kyphotic posture, forward head, rounded shoulders, posterior pelvic tilt in sitting, R knee extended in sitting - improved upright posture with increased R knee flexion in sitting on 5/2/2018  Palpation:          Significant pain with R patellar mobilizations (grade 2-3) medially/laterally and inferiorly/superiorly, significant pain with overpressure in R knee extension  ROM:    Initial measurement: (on 4/4/2018) Initial measurement: (on 4/4/2018) Reassessment measurement:  Reassessment measurement:    L LE: WFL throughout hip, knee and ankle (SLR to 90) R LE: WFL throughout, but significant pain throughout knee flexion, especially at end range, eased off slightly with movement into knee extension (but pain with overpressure into knee extension)         Strength:    Initial measurement: (on 4/4/2018) Initial measurement: (on 4/4/2018) Reassessment measurement:  Reassessment measurement:    L LE:  Hip flexion: 4+/5  Hip abduction: 4/5 (pain in R knee)  Hip adduction: 3+/5  Hip IR: 4+/5  Hip ER: 4/5  Knee extension: 4+/5 R LE:  Hip flexion: 4-/5 (pain in anterior R knee)  Hip abduction: 4-/5 (pain in anterior R knee along joint line)  Hip adduction: 3+/5 (pain in anterior R knee along joint line)  Hip IR: 4/5 (pain in anterior R knee along joint line)  Hip ER: 4-/5 (pain in anterior R knee along joint line)  Knee extension: 4-/5 (pain in anterior R knee along joint line)       Special Tests:          Valgus/varus stress test: - at 0 and 30 degrees  McConnel's test for patellar tracking: -  Did not attempt Pranav's test for meniscus due to pt's high guarding and resultant pain with any knee movement  Neurological Screen:        Myotomes:  WFL        Dermatomes:  Pt states she has peripheral neuropathy  Functional Mobility:         Gait/Ambulation:  Pt ambulates with no AD with following gait deficits: decreased R stance time, decreased B step length/clearance, increased lateral sway - less lateral sway and improved symmetry of stance time on 5/2/2018        Transfers:  [place]; uses hands to push up from/reach back for chair/mat; maintains R knee extension throughout transfer - pt able to perform sit to stand to sit transfer today with symmetrical LEs (R knee at 90 degrees flexion)        Bed Mobility:  [place]; increased time  Balance:          Fair to good (increased lateral sway during ambulation)    Body Structures Involved:  1. Nerves  2. Bones  3. Joints  4. Muscles  5. Ligaments 6. Body Functions Affected:  1. Sensory/Pain  2. Neuromusculoskeletal  3. Movement Related Activities and Participation Affected:  1. General Tasks and Demands  2. Mobility  3. Self Care  4. Domestic Life  5. Interpersonal Interactions and Relationships  6. Community, Social and [place]   # of elements that affect the Plan of Care:    CLINICAL PRESENTATION:    Presentation:  Evolving clinical presentation with changing clinical characteristics: MODERATE COMPLEXITY   CLINICAL DECISION MAKING:    Outcome Measure:    Tool Used: Lower Extremity Functional Scale (LEFS)  Score:  Initial: 6/80 Most Recent: 34/80 (Date: 5/2/2018 )   Interpretation of Score: 20 questions each scored on a 5 point scale with 0 representing \"extreme difficulty or unable to perform\" and 4 representing \"no difficulty\". The lower the score, the greater the functional disability. 80/80 represents no disability. Minimal detectable change is 9 points. Score 80 79-63 62-48 47-32 31-16 15-1 0   Modifier CH CI CJ CK CL CM CN     Tool Used: Timed Up and Go (TUG)  Score:  Initial: 13 seconds no AD Most Recent: 10 seconds (Date: 5/2/2018 )   Interpretation of Score: The test measures, in seconds, the time taken by an individual to stand up from a standard arm chair (seat height 46 cm [18 in], arm height 65 cm [25.6 in]), walk a distance of 3 meters (118 in, approx 10 ft), turn, walk back to the chair and sit down. If the individual takes longer than 14 seconds to complete TUG, this indicates risk for falls. Score 7 7.5-10.5 11-14 14.5-17.5 18-21 21.5-24.5 25+   Modifier CH CI CJ CK CL CM CN     Payor: [employer] / Plan: [address] [place] [address] / Product Type: PPO /   Medical Necessity:   · Patient is expected to demonstrate progress in strength, range of motion, balance and functional technique to improve ability to perform pain-free standing/ambulation and to improve overall quality of life. · Patient demonstrates good rehab potential due to higher previous functional level. Reason for Services/Other Comments:  · Patient continues to require modification of therapeutic interventions to increase complexity of exercises.  ·    Use of outcome tool(s) and clinical judgement create a POC that gives a:    TREATMENT:    (In addition to Assessment/Re-Assessment sessions the following treatments were rendered)  Subjective comments at beginning of session: Pt reports that her R knee has been hurting more since she has been working 12-16 hour shifts recently (states it is now hurting across the joint line, laterally and medially)      Therapeutic Exercise: ( 23 minutes):  Exercises per grid below to improve mobility, strength, balance and dynamic movement of knee - right to improve functional mobility. Required minimal visual, verbal and manual cues to promote proper body alignment, promote proper body posture and promote proper body mechanics. Progressed resistance, range and repetitions as indicated. Date:  4/30/2018 Date:  5/2/2018 Date:  5/15/2018   Activity/Exercise      Nustep L3 resistance for 10 minutes with B LEs and UEs to increase blood flow and knee mobility L3 resistance for 10 minutes with B LEs and UEs to increase blood flow and knee mobility L3 resistance for 10 minutes with B LEs and UEs to increase blood flow and knee mobility   Hamstring stretch 3 x 30 second hold right LE 3 x 30 second hold right LE passive stretch by therapist 3 x 30 second hold right LE passive stretch by therapist   Side lying TFL stretch      Prone quad stretch      Piriformis stretch      SLR in supine* 10 reps/2 sets R LE - pt had difficulty performing due to back pain even with cues for posterior pelvic tilt and core activation - cues to avoid complete extension of knee to reduce pain 10 reps/2 sets R LE with cues to maintain knee extension 10 reps/2 sets R LE with cues to maintain knee extension   SAQ in supine* 20 reps/1 set R LE then L LE 20 reps/1 set R LE then L LE 20 reps/1 set R LE only   Incline board for ankle plantarflexors  30 second hold x 3 reps with knees extended then flexed 30 second hold x 3 reps with knees extended then flexed 30 second hold x 3 reps with knees extended then flexed                     *given in HEP    MANUAL THERAPY: (17 minutes): Joint mobilization, Soft tissue mobilization and Manual lymphatic drainage was utilized and necessary because of the patient's restricted joint motion, painful spasm and restricted motion of soft tissue.  With pt in supported supine position with R LE supported, pulsed ultrasound performed (3 MHz, 1.3 W/cm2, 20%) to medial R knee to reduce inflammation and pain. With pt in same position, kinesiotape applied to medial/anterior R knee as well as lateral/anterior R knee with 25% stretch at each position, and kinesiotape applied in lateral position to inferior R knee with 80% stretch to reduce pressure at R knee joint line and reduce pain and inflammation. Modalities (10 minutes): With pt in supported supine position at end of session, cold pack (pillowcase layer) applied to R knee to reduce inflammation and pain. Treatment/Session Assessment:  Pt reporting decreased pain with manual therapy this session. However, she continues to report increased pain overall secondary to having job that requires significant amount of ambulation. She reported no pain at end of session secondary to ice numbing the pain. · Pain/ Symptoms: Initial:   6/10 in R knee Post Session:  0/10 ·   Compliance with Program/Exercises: Good compliance with attending scheduled sessions  · Recommendations/Intent for next treatment session: \"Next visit will focus on advancements to more challenging activities and reduction in assistance provided\".  Continue to work on manual therapy/modalities as needed to reduce pain; gait training with cane if needed; gentle strengthening exercises; postural exercises (to encourage pt to avoid hyperextension at knees)  Total Treatment Duration:  PT Patient Time In/Time Out  Time In: 0925  Time Out: [address], DPT

## 2018-05-21 ENCOUNTER — HOSPITAL ENCOUNTER (OUTPATIENT)
Dept: PHYSICAL THERAPY | Age: 58
Discharge: HOME | End: 2018-05-21
Attending: FAMILY MEDICINE
Payer: COMMERCIAL

## 2018-05-21 NOTE — THERAPY EVALUATION
Therapy Center at [address]  [phone]   [phone]     OUTPATIENT DAILY NOTE    NAME: Holly Woods    DATE: 5/22/2018    Patient canceled physical therapy appointment today (did not state why). Will plan to follow up on next scheduled visit.     TREVOR TangT

## 2018-05-23 ENCOUNTER — HOSPITAL ENCOUNTER (OUTPATIENT)
Dept: PHYSICAL THERAPY | Age: 58
Discharge: HOME | End: 2018-05-23
Attending: FAMILY MEDICINE
Payer: COMMERCIAL

## 2018-06-08 ENCOUNTER — PATIENT OUTREACH (OUTPATIENT)
Dept: OTHER | Age: 58
End: 2018-06-08

## 2018-06-08 NOTE — PROGRESS NOTES
Telephonic [facility] outreach to patient to follow up regarding knee pain and stabilization of blood pressure and blood sugars. Patient did keep her PCP visit on 5/1. Patient had been started on a new blood pressure medication: Norvasc. Patient reported that she feels that the medication has been effective, she is not having symptoms of high blood pressure, however at times her BP cuff is reading high. Discussed with patient that it could very well bed the equipment she is using. Patient plans to take the wrist cuff into work to compare to another blood pressure cuff and see if the reading is off. Will follow up with patient to address whether or not different equipment would be more accurate. Patient reports her blood sugars to be stable, ranging from , since beginning the Januvia. Patient is able to identify symptoms that occur when he blood sugar drops and then is able to drink a little juice or eat a healthy snack. Patient is not currently concerned regarding his blood sugars, feeling that they are well stabilized. Knee pain continues, patient is taking OTC Advil for pain. She also uses non pharmaceutical treatments to decrease the pain, as well as exercises that she learned from physical therapy. Physical therapy ended several weeks ago. Next PCP appointment is 8/6, however patient reports that she may need to bump that up to an earlier appointment. She is planning a trip to [place] for 45 days, to assist with a new grandchild that is on the way. Discussed possible changes in weather they may disrupt patient's breathing. Will follow up with patient in approximately 3 weeks to address blood pressure and begin preparing for trip to [place] and how her health can be affected.

## 2018-07-03 ENCOUNTER — PATIENT OUTREACH (OUTPATIENT)
Dept: OTHER | Age: 58
End: 2018-07-03

## 2018-07-03 NOTE — PROGRESS NOTES
Telephonic outreach to patient for CCM progress note. Left a discreet VM with confidential contact information. Patient does have a trip planned, so may be unable to reach patient. Will continue attempts to follow up.

## 2018-07-31 ENCOUNTER — PATIENT OUTREACH (OUTPATIENT)
Dept: OTHER | Age: 58
End: 2018-07-31

## 2018-07-31 NOTE — PROGRESS NOTES
CCM progress: 
Telephonic outreach attempt to follow up with patient. Patient recently had a follow up appointment and medication refill. Contact attempt to discuss appointment. Left a discreet VM and will continue attempts to contact his patient.

## 2018-08-08 NOTE — PROGRESS NOTES
I am accessing Ms. Godfrey's chart as a part of our department's internal chart auditing process. I certify that Ms. Srinivas Hanley is, or was, a patient in our department.   Thank you,  Margo Mancuso, DPT  8/8/2018

## 2018-08-30 ENCOUNTER — HOSPITAL ENCOUNTER (EMERGENCY)
Age: 58
Discharge: HOME | End: 2018-08-30
Attending: EMERGENCY MEDICINE
Payer: COMMERCIAL

## 2018-08-30 ENCOUNTER — PATIENT OUTREACH (OUTPATIENT)
Dept: OTHER | Age: 58
End: 2018-08-30

## 2018-08-30 VITALS
TEMPERATURE: 97.9 F | WEIGHT: 153 LBS | HEART RATE: 98 BPM | OXYGEN SATURATION: 95 % | HEIGHT: 63 IN | SYSTOLIC BLOOD PRESSURE: 146 MMHG | DIASTOLIC BLOOD PRESSURE: 85 MMHG | BODY MASS INDEX: 27.11 KG/M2 | RESPIRATION RATE: 20 BRPM

## 2018-08-30 DIAGNOSIS — S61.011A LACERATION OF RIGHT THUMB WITHOUT FOREIGN BODY WITHOUT DAMAGE TO NAIL, INITIAL ENCOUNTER: Primary | ICD-10-CM

## 2018-08-30 PROCEDURE — 99282 EMERGENCY DEPT VISIT SF MDM: CPT | Performed by: EMERGENCY MEDICINE

## 2018-08-30 NOTE — PROGRESS NOTES
CCM telephonic outreach attempt to follow up with patient and assess current progress towards goals. Left a discreet VM. Will continue attempts to contact.

## 2018-08-31 NOTE — ED NOTES
After Visit Summary   7/28/2017    Yvonne Jordan    MRN: [id number]           Patient Information     Date Of Birth          1988        Visit Information        Provider Department      7/28/2017 3:00 PM Agbeh, Cephas Mawuena, MD [facility]        Today's Diagnoses     PCOS (polycystic ovarian syndrome)    -  1    Hypothyroidism, unspecified type        Obesity, unspecified obesity severity, unspecified obesity type           Follow-ups after your visit        Future tests that were ordered for you today     Open Future Orders        Priority Expected Expires Ordered    Hepatitis A antibody IgM Routine  7/28/2018 7/28/2017    Hepatitis B Surface Antibody Routine  7/28/2018 7/28/2017    Hepatitis B core antibody IgM Routine  7/28/2018 7/28/2017    Hepatitis C High Resolution Genotype Routine  7/28/2018 7/28/2017    Hepatitis C Screen Reflex to HCV RNA Quant and Genotype Routine  7/28/2018 7/28/2017    Hepatitis B core antibody Routine  7/28/2018 7/28/2017            Who to contact     If you have questions or need follow up information about today's clinic visit or your schedule please contact [facility] directly at [phone].  Normal or non-critical lab and imaging results will be communicated to you by Seratishart, letter or phone within 4 business days after the clinic has received the results. If you do not hear from us within 7 days, please contact the clinic through Seratishart or phone. If you have a critical or abnormal lab result, we will notify you by phone as soon as possible.  Submit refill requests through [employer] or call your pharmacy and they will forward the refill request to us. Please allow 3 business days for your refill to be completed.          Additional Information About Your Visit        MyChart Information     [employer] gives you secure access to your electronic health record. If you see a primary care provider, you can also send messages to your care  I have reviewed discharge instructions with the patient. The patient verbalized understanding. Patient left ED via Discharge Method: ambulatory to Home with self Opportunity for questions and clarification provided. Patient given 0 scripts. To continue your aftercare when you leave the hospital, you may receive an automated call from our care team to check in on how you are doing. This is a free service and part of our promise to provide the best care and service to meet your aftercare needs.  If you have questions, or wish to unsubscribe from this service please call [phone]. Thank you for Choosing our [facility] Emergency Department. team and make appointments. If you have questions, please call your primary care clinic.  If you do not have a primary care provider, please call [phone] and they will assist you.        Care EveryWhere ID     This is your Care EveryWhere ID. This could be used by other organizations to access your [facility] medical records  [id number]        Your Vitals Were     Pulse Temperature Last Period Pulse Oximetry BMI (Body Mass Index)       104 99.7  F (37.6  C) (Tympanic) 07/24/2017 95% 47.79 kg/m2        Blood Pressure from Last 3 Encounters:   07/28/17 133/88   02/02/17 148/88   11/25/16 134/84    Weight from Last 3 Encounters:   07/28/17 (!) 312 lb (141.5 kg)   02/02/17 299 lb (135.6 kg)   11/25/16 (!) 300 lb 6.4 oz (136.3 kg)              We Performed the Following     Comprehensive metabolic panel     Hemoglobin A1c     Hepatitis Be antibody     Hepatitis Be antigen     TSH with free T4 reflex        Primary Care Provider Office Phone # Fax #    Sophia Gallegos [phone]       [facility] [address] [facility] [address]        Equal Access to Services     SARAVANAN HYDE : Hadii aad ku hadasho Soomaali, waaxda luqadaha, qaybta kaalmada adeegyada, waxgil espositoin haygraysonn jolynn castaneda. So [facility] [phone].    ATENCIÓN: Si habla español, tiene a velasquez disposición servicios gratuitos de asistencia lingüística. Llame al [phone].    We comply with applicable federal civil rights laws and [place] laws. We do not discriminate on the basis of race, color, national origin, age, disability sex, sexual orientation or gender identity.            Thank you!     Thank you for choosing [facility]  for your care. Our goal is always to provide you with excellent care. Hearing back from our patients is one way we can continue to improve our services. Please take a few minutes to complete the written survey that you may receive in the mail after your visit with us. Thank  you!             Your Updated Medication List - Protect others around you: Learn how to safely use, store and throw away your medicines at www.disposemymeds.org.          This list is accurate as of: 7/28/17  5:03 PM.  Always use your most recent med list.                   Brand Name Dispense Instructions for use Diagnosis    levothyroxine 50 MCG tablet    SYNTHROID/LEVOTHROID    90 tablet    Take 1 tablet (50 mcg) by mouth daily    Acquired hypothyroidism       lisinopril-hydrochlorothiazide 10-12.5 MG per tablet    PRINZIDE/ZESTORETIC    90 tablet    Take 1 tablet by mouth daily    Essential hypertension with goal blood pressure less than 130/80       Multi-vitamin Tabs tablet      Take 1 tablet by mouth daily

## 2018-08-31 NOTE — ED PROVIDER NOTES
HPI Comments: 12-year-old lady presents with concerns about a  A bolus and injury to the pad of her right thumb. She was using some form of vegetable slicer and cut the tip of her thumb caught. She denies any other injury. Elements of this note were created using speech recognition software. As such, errors of speech recognition may be present. Patient is a 59 y.o. female presenting with skin laceration. The history is provided by the patient. Laceration Past Medical History:  
Diagnosis Date  Anxiety  Arrhythmia   
 hx svt  Asthma 2/4/2010  Depression  Diabetes mellitus type 2, controlled, without complications ([facility] [address]  Dyslipidemia 5/16/2010  Hypertension  Migraines  SVT (supraventricular tachycardia) (HCC)  Vitamin D deficiency Past Surgical History:  
Procedure Laterality Date [address] [place] [address] UNLISTED [address]  HX LAP CHOLECYSTECTOMY  2013  HX MICHI AND BSO  2001 Hysterectomy  HX TONSILLECTOMY  as child  HX TUBAL LIGATION Bilateral   
 1995 Family History:  
Problem Relation Age of Onset  Cancer Mother   
  colon  Elevated Lipids Mother  Cancer Father   
  colon  Diabetes Father   
  T2DM  Other Brother   
  sleep apnea Social History Social History  Marital status:  Spouse name: N/A  
 Number of children: N/A  
 Years of education: N/A Occupational History  Not on file. Social History Main Topics  Smoking status: Never Smoker  Smokeless tobacco: Never Used  Alcohol use No  
 Drug use: No  
 Sexual activity: No  
 
Other Topics Concern  Not on file Social History Narrative ALLERGIES: Codeine and Meperidine Review of Systems Constitutional: Negative for chills and fever. Musculoskeletal: Negative for arthralgias and joint swelling. Skin: Positive for color change and wound. Vitals: 08/30/18 2233 08/30/18 2305 BP: 146/85 Pulse: 98 Resp: 20 Temp: 97.9 °F (36.6 °C) SpO2: 95% 95% Weight: 69.4 kg (153 lb) Height: 5' 3\" (1.6 m) Physical Exam  
Constitutional: She appears well-developed and well-nourished. Skin:  
Small evulsion injury to the very tip of her right thumb. Does not appear to involve the nailbed. MDM Number of Diagnoses or Management Options Laceration of right thumb without foreign body without damage to nail, initial encounter:  
Diagnosis management comments: Bleeding was controlled on arrival.  We will bandage it with quick clot and discharge her home. ED Course Procedures

## 2018-08-31 NOTE — ED TRIAGE NOTES
C/o lac to tip right thumb from vegetable slicer approx 2 hours pta. States unable to control bleeding. Denies blood thinners. Tetanus up to date.

## 2018-08-31 NOTE — DISCHARGE INSTRUCTIONS
Keep the wound clean with soap and water and apply an antibacterial ointment twice a day. Return with any redness, swelling, fevers, worsening symptoms, or additional concerns.

## 2018-09-24 ENCOUNTER — PATIENT OUTREACH (OUTPATIENT)
Dept: OTHER | Age: 58
End: 2018-09-24

## 2018-09-24 NOTE — PROGRESS NOTES
[facility] Telephonic outreach attempt to follow up with patient and assess her progress towards goals. Left a discreet VM and will continue attempts to reach this patient.

## 2018-10-12 ENCOUNTER — PATIENT OUTREACH (OUTPATIENT)
Dept: OTHER | Age: 58
End: 2018-10-12

## 2018-10-12 NOTE — LETTER
10/12/2018 12:31 PM 
 
Ms. Edenilson Heart [address] [place] [phone] Dear Ms. Randal Olsen,  
 
I have been trying to reach you for a follow up call for services with our Employee Care Management Program. Your wellbeing is very important to us. With continued partnership in the [facility] SURG[facility] program, we hope to work with you to optimize your health and increase your quality of life. I look forward to continuing to work with you. Please contact me at your convenience and we can schedule a time that works best for you. Sincerely, AMELIA Rojo RN  Employee Care Manager [address] [place] [address], [address] [place] [address] [facility] Cell [phone] Fax [email or web address]

## 2018-10-12 NOTE — PROGRESS NOTES
[facility] Telephonic outreach to patient to follow up with patient regarding her request for medication refill. Left a discreet VM. This CM has made multiple calls with no return call. Will send letter. Will await a return call, if no call received will close episode.

## 2018-10-18 ENCOUNTER — PATIENT OUTREACH (OUTPATIENT)
Dept: OTHER | Age: 58
End: 2018-10-18

## 2018-10-18 NOTE — PROGRESS NOTES
Received incoming call from patient informing this care manager that she is doing well. Patient plans to schedule a follow up appointment with her PCP to obtain a refill of medications and to follow up with labs. Patient reports making progress and levels remaining stable. Patient voiced that she has been very busy, so difficult to answer this care managers calls. Will continue to follow this patient.

## 2018-11-02 ENCOUNTER — HOSPITAL ENCOUNTER (OUTPATIENT)
Dept: MRI IMAGING | Age: 58
Discharge: HOME | End: 2018-11-02
Attending: FAMILY MEDICINE
Payer: COMMERCIAL

## 2018-11-02 DIAGNOSIS — M25.561 RIGHT KNEE PAIN, UNSPECIFIED CHRONICITY: ICD-10-CM

## 2018-11-02 PROCEDURE — 73721 MRI JNT OF LWR EXTRE W/O DYE: CPT

## 2018-11-14 ENCOUNTER — PATIENT OUTREACH (OUTPATIENT)
Dept: OTHER | Age: 58
End: 2018-11-14

## 2018-11-14 NOTE — PROGRESS NOTES
[facility] Telephonic outreach to follow up with patient and assess progress and if the patient was able to get appointments scheduled. Left a discreet VM. Will continue outreach attempts.

## 2018-12-05 ENCOUNTER — PATIENT OUTREACH (OUTPATIENT)
Dept: OTHER | Age: 58
End: 2018-12-05

## 2018-12-05 NOTE — PROGRESS NOTES
[facility] Telephonic outreach attempt to assess needs prior to scheduled surgery and to assess progress towards goals. Left a discreet VM. Will continue to attempt to contact this patient.

## 2018-12-06 ENCOUNTER — ANESTHESIA EVENT (OUTPATIENT)
Dept: SURGERY | Age: 58
End: 2018-12-06
Payer: COMMERCIAL

## 2018-12-07 ENCOUNTER — HOSPITAL ENCOUNTER (OUTPATIENT)
Age: 58
Setting detail: OUTPATIENT SURGERY
Discharge: HOME | End: 2018-12-07
Attending: ORTHOPAEDIC SURGERY | Admitting: ORTHOPAEDIC SURGERY
Payer: COMMERCIAL

## 2018-12-07 ENCOUNTER — ANESTHESIA (OUTPATIENT)
Dept: SURGERY | Age: 58
End: 2018-12-07
Payer: COMMERCIAL

## 2018-12-07 VITALS
TEMPERATURE: 97.9 F | SYSTOLIC BLOOD PRESSURE: 99 MMHG | OXYGEN SATURATION: 94 % | BODY MASS INDEX: 29.05 KG/M2 | WEIGHT: 164 LBS | DIASTOLIC BLOOD PRESSURE: 54 MMHG | RESPIRATION RATE: 14 BRPM | HEART RATE: 98 BPM

## 2018-12-07 LAB
GLUCOSE BLD STRIP.AUTO-MCNC: 170 MG/DL (ref 65–100)
POTASSIUM BLD-SCNC: 3.2 MMOL/L (ref 3.5–5.1)

## 2018-12-07 PROCEDURE — 74011250636 HC RX REV CODE- 250/636: Performed by: ANESTHESIOLOGY

## 2018-12-07 PROCEDURE — 77030018836 HC SOL IRR NACL ICUM -A: Performed by: ORTHOPAEDIC SURGERY

## 2018-12-07 PROCEDURE — 76210000063 HC OR PH I REC FIRST 0.5 HR: Performed by: ORTHOPAEDIC SURGERY

## 2018-12-07 PROCEDURE — 77030006668 HC BLD SHV MENSCS STRY -B: Performed by: ORTHOPAEDIC SURGERY

## 2018-12-07 PROCEDURE — 74011250636 HC RX REV CODE- 250/636: Performed by: PHYSICIAN ASSISTANT

## 2018-12-07 PROCEDURE — 74011000250 HC RX REV CODE- 250

## 2018-12-07 PROCEDURE — 76060000032 HC ANESTHESIA 0.5 TO 1 HR: Performed by: ORTHOPAEDIC SURGERY

## 2018-12-07 PROCEDURE — 82962 GLUCOSE BLOOD TEST: CPT

## 2018-12-07 PROCEDURE — 77030010509 HC AIRWY LMA MSK TELE -A: Performed by: ANESTHESIOLOGY

## 2018-12-07 PROCEDURE — 74011250636 HC RX REV CODE- 250/636

## 2018-12-07 PROCEDURE — 74011250636 HC RX REV CODE- 250/636: Performed by: ORTHOPAEDIC SURGERY

## 2018-12-07 PROCEDURE — 77030038012 HC WND COBLATN S&N -F: Performed by: ORTHOPAEDIC SURGERY

## 2018-12-07 PROCEDURE — 84132 ASSAY OF SERUM POTASSIUM: CPT

## 2018-12-07 PROCEDURE — 76210000020 HC REC RM PH II FIRST 0.5 HR: Performed by: ORTHOPAEDIC SURGERY

## 2018-12-07 PROCEDURE — 77030000032 HC CUF TRNQT ZIMM -B: Performed by: ORTHOPAEDIC SURGERY

## 2018-12-07 PROCEDURE — 76010000138 HC OR TIME 0.5 TO 1 HR: Performed by: ORTHOPAEDIC SURGERY

## 2018-12-07 RX ORDER — ROPIVACAINE HYDROCHLORIDE 5 MG/ML
INJECTION, SOLUTION EPIDURAL; INFILTRATION; PERINEURAL AS NEEDED
Status: DISCONTINUED | OUTPATIENT
Start: 2018-12-07 | End: 2018-12-07 | Stop reason: HOSPADM

## 2018-12-07 RX ORDER — CEFAZOLIN SODIUM/WATER 2 G/20 ML
2 SYRINGE (ML) INTRAVENOUS ONCE
Status: COMPLETED | OUTPATIENT
Start: 2018-12-07 | End: 2018-12-07

## 2018-12-07 RX ORDER — OXYCODONE HYDROCHLORIDE 5 MG/1
10 TABLET ORAL
Status: DISCONTINUED | OUTPATIENT
Start: 2018-12-07 | End: 2018-12-07 | Stop reason: HOSPADM

## 2018-12-07 RX ORDER — FENTANYL CITRATE 50 UG/ML
INJECTION, SOLUTION INTRAMUSCULAR; INTRAVENOUS AS NEEDED
Status: DISCONTINUED | OUTPATIENT
Start: 2018-12-07 | End: 2018-12-07 | Stop reason: HOSPADM

## 2018-12-07 RX ORDER — ONDANSETRON 2 MG/ML
4 INJECTION INTRAMUSCULAR; INTRAVENOUS ONCE
Status: DISCONTINUED | OUTPATIENT
Start: 2018-12-07 | End: 2018-12-07 | Stop reason: HOSPADM

## 2018-12-07 RX ORDER — NALOXONE HYDROCHLORIDE 0.4 MG/ML
0.1 INJECTION, SOLUTION INTRAMUSCULAR; INTRAVENOUS; SUBCUTANEOUS AS NEEDED
Status: DISCONTINUED | OUTPATIENT
Start: 2018-12-07 | End: 2018-12-07 | Stop reason: HOSPADM

## 2018-12-07 RX ORDER — PROPOFOL 10 MG/ML
INJECTION, EMULSION INTRAVENOUS AS NEEDED
Status: DISCONTINUED | OUTPATIENT
Start: 2018-12-07 | End: 2018-12-07 | Stop reason: HOSPADM

## 2018-12-07 RX ORDER — ALBUTEROL SULFATE 0.83 MG/ML
2.5 SOLUTION RESPIRATORY (INHALATION) AS NEEDED
Status: DISCONTINUED | OUTPATIENT
Start: 2018-12-07 | End: 2018-12-07 | Stop reason: HOSPADM

## 2018-12-07 RX ORDER — MIDAZOLAM HYDROCHLORIDE 1 MG/ML
2 INJECTION, SOLUTION INTRAMUSCULAR; INTRAVENOUS ONCE
Status: COMPLETED | OUTPATIENT
Start: 2018-12-07 | End: 2018-12-07

## 2018-12-07 RX ORDER — LIDOCAINE HYDROCHLORIDE 10 MG/ML
0.1 INJECTION INFILTRATION; PERINEURAL AS NEEDED
Status: DISCONTINUED | OUTPATIENT
Start: 2018-12-07 | End: 2018-12-07 | Stop reason: HOSPADM

## 2018-12-07 RX ORDER — OXYCODONE HYDROCHLORIDE 5 MG/1
5 TABLET ORAL
Status: DISCONTINUED | OUTPATIENT
Start: 2018-12-07 | End: 2018-12-07 | Stop reason: HOSPADM

## 2018-12-07 RX ORDER — ONDANSETRON 2 MG/ML
INJECTION INTRAMUSCULAR; INTRAVENOUS AS NEEDED
Status: DISCONTINUED | OUTPATIENT
Start: 2018-12-07 | End: 2018-12-07 | Stop reason: HOSPADM

## 2018-12-07 RX ORDER — DIPHENHYDRAMINE HYDROCHLORIDE 50 MG/ML
12.5 INJECTION, SOLUTION INTRAMUSCULAR; INTRAVENOUS
Status: DISCONTINUED | OUTPATIENT
Start: 2018-12-07 | End: 2018-12-07 | Stop reason: HOSPADM

## 2018-12-07 RX ORDER — MIDAZOLAM HYDROCHLORIDE 1 MG/ML
2 INJECTION, SOLUTION INTRAMUSCULAR; INTRAVENOUS
Status: DISCONTINUED | OUTPATIENT
Start: 2018-12-07 | End: 2018-12-07 | Stop reason: HOSPADM

## 2018-12-07 RX ORDER — LIDOCAINE HYDROCHLORIDE 20 MG/ML
INJECTION, SOLUTION EPIDURAL; INFILTRATION; INTRACAUDAL; PERINEURAL AS NEEDED
Status: DISCONTINUED | OUTPATIENT
Start: 2018-12-07 | End: 2018-12-07 | Stop reason: HOSPADM

## 2018-12-07 RX ORDER — SODIUM CHLORIDE, SODIUM LACTATE, POTASSIUM CHLORIDE, CALCIUM CHLORIDE 600; 310; 30; 20 MG/100ML; MG/100ML; MG/100ML; MG/100ML
100 INJECTION, SOLUTION INTRAVENOUS CONTINUOUS
Status: DISCONTINUED | OUTPATIENT
Start: 2018-12-07 | End: 2018-12-07 | Stop reason: HOSPADM

## 2018-12-07 RX ORDER — HYDROMORPHONE HYDROCHLORIDE 2 MG/ML
0.5 INJECTION, SOLUTION INTRAMUSCULAR; INTRAVENOUS; SUBCUTANEOUS
Status: DISCONTINUED | OUTPATIENT
Start: 2018-12-07 | End: 2018-12-07 | Stop reason: HOSPADM

## 2018-12-07 RX ORDER — FENTANYL CITRATE 50 UG/ML
100 INJECTION, SOLUTION INTRAMUSCULAR; INTRAVENOUS ONCE
Status: DISCONTINUED | OUTPATIENT
Start: 2018-12-07 | End: 2018-12-07 | Stop reason: HOSPADM

## 2018-12-07 RX ADMIN — SODIUM CHLORIDE, SODIUM LACTATE, POTASSIUM CHLORIDE, AND CALCIUM CHLORIDE 100 ML/HR: 600; 310; 30; 20 INJECTION, SOLUTION INTRAVENOUS at 06:55

## 2018-12-07 RX ADMIN — PROPOFOL 150 MG: 10 INJECTION, EMULSION INTRAVENOUS at 07:36

## 2018-12-07 RX ADMIN — MIDAZOLAM HYDROCHLORIDE 1 MG: 2 INJECTION, SOLUTION INTRAMUSCULAR; INTRAVENOUS at 07:13

## 2018-12-07 RX ADMIN — ONDANSETRON 4 MG: 2 INJECTION INTRAMUSCULAR; INTRAVENOUS at 07:51

## 2018-12-07 RX ADMIN — LIDOCAINE HYDROCHLORIDE 100 MG: 20 INJECTION, SOLUTION EPIDURAL; INFILTRATION; INTRACAUDAL; PERINEURAL at 07:36

## 2018-12-07 RX ADMIN — FENTANYL CITRATE 50 MCG: 50 INJECTION, SOLUTION INTRAMUSCULAR; INTRAVENOUS at 07:36

## 2018-12-07 RX ADMIN — Medication 2 G: at 07:41

## 2018-12-07 NOTE — ANESTHESIA POSTPROCEDURE EVALUATION
Procedure(s): RIGHT KNEE ARTHROSCOPY, LATERAL MENISCECTOMY. Anesthesia Post Evaluation Multimodal analgesia: multimodal analgesia used between 6 hours prior to anesthesia start to PACU discharge Patient location during evaluation: PACU Patient participation: complete - patient participated Level of consciousness: awake and awake and alert Pain management: adequate Airway patency: patent Anesthetic complications: no 
Cardiovascular status: acceptable Respiratory status: acceptable Hydration status: acceptable Visit Vitals BP 99/54 Pulse 98 Temp 36.6 °C (97.9 °F) Resp 14 Wt 74.4 kg (164 lb) SpO2 94% BMI 29.05 kg/m²

## 2018-12-07 NOTE — DISCHARGE INSTRUCTIONS
Post-Operative Instructions   For  Knee Arthroscopy  Phone:  [phone]    1. Unless otherwise instructed, you may place as much weight on your leg as you wish. 2. If you do not have an \"Iceman\" type cooling unit, for the first 48-72 hours following surgery, use ice on the knee every two hours (while awake) for 20-30 minutes at a time to help prevent swelling and lessen pain. If you have a cooling unit, follow the instructions given to you- continually as much as possible the first 48-72 hours, then 3-4 times a day for 4 weeks. Elevate leg. 3. Perform at least 30 to 50 leg-raising exercises twice a day. Begin exercise as soon as pain allows. Also perform gentle active motion of the knee as soon as pain allows. 4. On the third day after surgery, remove the dressing. Leave steri-strips on, they eventually will peel off.      5. Use any pain medication as instructed. You should take your pain medication as soon as you feel the anesthetic wearing off. Do not wait until you are in severe pain to begin taking your pain medication. 6. You may have some side effects from your pain medication. If you have nausea, try taking your medication with food. For itching, you may take over the counter Benadryl. 7. You may shower as soon as desired. The first three days after surgery, wrap the dressings in saran wrap to keep them dry when showering. 8. You may have been given a prescription for Zofran or Phenergan. This medication is used for nausea and vomiting. You do not need to get this prescription filled unless you have a problem. 9. If you have a problem, please call [address] [place] [address] at [phone], P.A.     TYPICAL SIDE EFFECTS OF PAIN MEDICATION:  *    Constipation: Drink lots of fluids. Over the counter stool softener if needed. *    Nausea: Take pain medication with food. Call your doctor with persistent nausea. ACTIVITY  · As tolerated and as directed by your doctor. · Bathe or shower as directed by your doctor. DIET  · Day of surgery: Clear liquids until no nausea or vomiting; small portion, light diet Wayne foods (ex: baked chicken, plain rice, grits, scrambled eggs, toast). Nothing greasy, fried or spicy today. · Advance to regular diet on second day, unless your doctor orders otherwise. · If nausea and vomiting continues, call your doctor. PAIN  · Take pain medication as directed by your doctor. · DO NOT take aspirin or blood thinners unless directed by your doctor. CALL YOUR DOCTOR IF    s Call your doctor if pain is NOT relieved by medication.   s Excessive bleeding that does not stop after holding pressure over the area  · Temperature of 101 degrees F or above  · Excessive redness, swelling or bruising, and/ or green or yellow, smelly discharge from incision    AFTER ANESTHESIA   · For the first 24 hours: DO NOT Drive, Drink alcoholic beverages, or Make important decisions. · Be aware of dizziness following anesthesia and while taking pain medication. DISCHARGE SUMMARY from Nurse    PATIENT INSTRUCTIONS:    After general anesthesia or intravenous sedation, for 24 hours or while taking prescription Narcotics:  · Limit your activities  · Do not drive and operate hazardous machinery  · Do not make important personal or business decisions  · Do  not drink alcoholic beverages  · If you have not urinated within 8 hours after discharge, please contact your surgeon on call. *  Please give a list of your current medications to your Primary Care Provider. *  Please update this list whenever your medications are discontinued, doses are      changed, or new medications (including over-the-counter products) are added. *  Please carry medication information at all times in case of emergency situations.     Preventing Infection at Home  We care about preventing infection and avoiding the spread of germs - not only when you are in the hospital but also when you return home. When you return home from the hospital, its important to take the following steps to help prevent infection and avoid spreading germs that could infect you and others. Ask everyone in your home to follow these guidelines, too. Clean Your Hands  · Clean your hands whenever your hands are visibly dirty, before you eat, before or after touching your mouth, nose or eyes, and before preparing food. Clean them after contact with body fluids, using the restroom, touching animals or changing diapers. · When washing hands, wet them with warm water and work up a lather. Rub hands for at least 15 seconds, then rinse them and pat them dry with a clean towel or paper towel. · When using hand sanitizers, it should take about 15 seconds to rub your hands dry. If not, you probably didnt apply enough . Cover Your Sneeze or Cough  Germs are released into the air whenever you sneeze or cough. To prevent the spread of infection:  · Turn away from other people before coughing or sneezing. · Cover your mouth or nose with a tissue when you cough or sneeze. Put the tissue in the trash. · If you dont have a tissue, cough or sneeze into your upper sleeve, not your hands. · Always clean your hands after coughing or sneezing. Care for Wounds  Your skin is your bodys first line of defense against germs, but an open wound leaves an easy way for germs to enter your body. To prevent infection:  · Clean your hands before and after changing wound dressings, and wear gloves to change dressings if recommended by your doctor. · Take special care with IV lines or other devices inserted into the body. If you must touch them, clean your hands first.  · Follow any specific instructions from your doctor to care for your wounds.  Contact your doctor if you experience any signs of infection, such as fever or increased redness at the surgical or wound site. Keep a Clean Home  · Clean or wipe commonly touched hard surfaces like door handles, sinks, tabletops, phones and TV remotes. · Use products labeled disinfectant to kill harmful bacteria and viruses. · Use a clean cloth or paper towel to clean and dry surfaces. Wiping surfaces with a dirty dishcloth, sponge or towel will only spread germs. · Never share toothbrushes, phillips, drinking glasses, utensils, razor blades, face cloths or bath towels to avoid spreading germs. · Be sure that the linens that you sleep on are clean. · Keep pets away from wounds and wash your hands after touching pets, their toys or bedding. We care about you and your health. Remember, preventing infections is a team effort between you, your family, friends and health care providers. These are general instructions for a healthy lifestyle:    No smoking/ No tobacco products/ Avoid exposure to second hand smoke    Surgeon General's Warning:  Quitting smoking now greatly reduces serious risk to your health. Obesity, smoking, and sedentary lifestyle greatly increases your risk for illness    A healthy diet, regular physical exercise & weight monitoring are important for maintaining a healthy lifestyle    You may be retaining fluid if you have a history of heart failure or if you experience any of the following symptoms:  Weight gain of 3 pounds or more overnight or 5 pounds in a week, increased swelling in our hands or feet or shortness of breath while lying flat in bed. Please call your doctor as soon as you notice any of these symptoms; do not wait until your next office visit. Recognize signs and symptoms of STROKE:    F-face looks uneven    A-arms unable to move or move unevenly    S-speech slurred or non-existent    T-time-call 911 as soon as signs and symptoms begin-DO NOT go       Back to bed or wait to see if you get better-TIME IS BRAIN.

## 2018-12-07 NOTE — ANESTHESIA PREPROCEDURE EVALUATION
Anesthetic History Review of Systems / Medical History Patient summary reviewed, nursing notes reviewed and pertinent labs reviewed Pulmonary COPD: mild Asthma : well controlled Neuro/Psych Cardiovascular Hypertension Dysrhythmias : SVT Exercise tolerance: >4 METS 
  
GI/Hepatic/Renal 
  
 
 
 
 
 
 Endo/Other Diabetes: well controlled, type 2, using insulin Other Findings Physical Exam 
 
Airway Mallampati: II 
TM Distance: 4 - 6 cm Neck ROM: normal range of motion Mouth opening: Normal 
 
 Cardiovascular Regular rate and rhythm,  S1 and S2 normal,  no murmur, click, rub, or gallop Dental 
No notable dental hx Pulmonary Breath sounds clear to auscultation Abdominal 
 
 
 
 Other Findings Anesthetic Plan ASA: 3 Anesthesia type: general 
 
 
 
 
Induction: Intravenous Anesthetic plan and risks discussed with: Patient

## 2018-12-07 NOTE — H&P
Outpatient Surgery History and Physical: 
Thomes Felty was seen and examined. CHIEF COMPLAINT:   Right knee pain. PE: 
  
Visit Vitals /58 (BP 1 Location: Right arm, BP Patient Position: Sitting) Pulse (!) 107 Temp 98.3 °F (36.8 °C) Resp 18 Wt 74.4 kg (164 lb) SpO2 96% BMI 29.05 kg/m² Heart:   Regular rhythm Lungs:  Are clear Past Medical History:   
Patient Active Problem List  
 Diagnosis  Acute pain of right knee  Hypotension  Volume depletion  Viral gastroenteritis  Acute renal failure (ARF) ([address]  SIRS (systemic inflammatory response syndrome) (HCC)  Fatigue  Syncope  Acute midline low back pain without sciatica  Lipoma of face  Left foot pain  Type 2 diabetes mellitus with diabetic neuropathy, without long-term current use of insulin ([address]  Vitamin D deficiency  Depression  Anxiety  Hypertension  CAD (coronary artery disease) SVT  Arrhythmia  
  hx svt  SVT (supraventricular tachycardia) (HCC)  Dyslipidemia  Asthma Surgical History:  
Past Surgical History:  
Procedure Laterality Date [address]  HX LAP CHOLECYSTECTOMY  2013  HX MICHI AND BSO  2001 Hysterectomy  HX TONSILLECTOMY  as child  HX TUBAL LIGATION Bilateral   
 1995 Social History: Patient  reports that  has never smoked. she has never used smokeless tobacco. She reports that she does not drink alcohol or use drugs. Family History:  
Family History Problem Relation Age of Onset  Cancer Mother   
     colon  Elevated Lipids Mother  Cancer Father   
     colon  Diabetes Father   
     T2DM  Other Brother   
     sleep apnea Allergies: Reviewed per EMR Allergies Allergen Reactions  Codeine Itching  Meperidine Other (comments) Hallucinations Medications: No current facility-administered medications on file prior to encounter. Current Outpatient Medications on File Prior to Encounter Medication Sig  
 amLODIPine (NORVASC) 5 mg tablet Take 1 Tab by mouth daily.  atorvastatin (LIPITOR) 20 mg tablet Take 1 Tab by mouth daily.  losartan-hydroCHLOROthiazide (HYZAAR) 100-25 mg per tablet Take 1 Tab by mouth daily.  potassium chloride SA (MICRO-K) 10 mEq capsule Take 1 Cap by mouth two (2) times a day.  SITagliptin (JANUVIA) 100 mg tablet Take 1 Tab by mouth daily.  dapagliflozin (FARXIGA) 10 mg tab tablet Take 1 Tab by mouth daily.  fluticasone-salmeterol (ADVAIR DISKUS) 250-50 mcg/dose diskus inhaler Take 1 Puff by inhalation every twelve (12) hours. (Patient taking differently: Take 1 Puff by inhalation two (2) times daily as needed.)  albuterol (VENTOLIN HFA) 90 mcg/actuation inhaler Take 2 Puffs by inhalation every four (4) hours as needed for Wheezing.  omega-3 acid ethyl esters (LOVAZA) 1 gram capsule Take 2 Caps by mouth two (2) times a day.  levalbuterol (XOPENEX) 1.25 mg/3 mL nebu 3 mL by Nebulization route every four (4) hours as needed.  meclizine (ANTIVERT) 25 mg tablet Take 1 Tab by mouth three (3) times daily as needed.  ondansetron (ZOFRAN ODT) 4 mg disintegrating tablet Take 1 tablet by mouth every eight (8) hours as needed. The surgery is planned for the right knee arthroscopy. History and physical has been reviewed. The patient has been examined. There have been no significant clinical changes since the completion of the originally dated History and Physical. 
Patient identified by surgeon; surgical site was confirmed by patient and surgeon. The patient is here today for outpatient surgery. I have examined the patient, no changes are noted in the patient's medical status. Necessity for the procedure/care is still present and the history and physical above is current.   See the office notes for the full long term history of the problem. Please see the recent office notes for the musculoskeletal examination. Signed By: EARNEST Mariee  December 7, 2018 6:43 AM

## 2018-12-07 NOTE — OP NOTES
[facility] [address]  OPERATIVE REPORT    Kiesha Retana  MR#: [id number]  : 1960  ACCOUNT #: [de-identified]   DATE OF SERVICE: 2018    PREOPERATIVE DIAGNOSES:  1. Chondromalacia trochlea - patellofemoral compartment. 2.  Lateral meniscal tear and chondromalacia of lateral tibial plateau - lateral compartment. 3.  Osteophyte intercondylar notch right knee. POSTOPERATIVE DIAGNOSES:    1. Chondromalacia trochlea - patellofemoral compartment. 2.  Lateral meniscal tear and chondromalacia of lateral tibial plateau - lateral compartment. 3.  Osteophyte intercondylar notch right knee. PROCEDURES PERFORMED:  1. Abrasion chondroplasty trochlea - patellofemoral compartment. 2.  Partial lateral meniscectomy and chondroplasty of lateral tibial plateau - lateral compartment. 3.  Excision of osteophyte intercondylar notch right knee. SURGEON:  Judah Maria MD    ASSISTANT:  EARNEST Mckeon    ANESTHESIA:  General.    FLUIDS:  Crystalloid. ESTIMATED BLOOD LOSS:  Minimal.    SPECIMENS REMOVED:  None. COMPLICATIONS:  None. IMPLANTS:  None. FINDINGS:  There was some edge tearing of the lateral meniscus, showed some grade II-III chondromalacia of the lateral tibial plateau 1 x 1.5 cm. There was grade II/III/IV chondromalacia of the trochlea 1.5 x 1.5 cm. There was an osteophyte/ridge in the lateral aspect of the intercondylar notch. DESCRIPTION OF PROCEDURE:  After informed consent, the patient was brought to the operating room and placed on the operating room table in the supine position. General endotracheal anesthesia administered without difficulty. Tourniquet was applied to the right upper thigh. Right knee and leg were prepped and draped in usual sterile fashion. Tourniquet was inflated. Standard inferomedial and inferolateral portals were made for scope and instruments. Knee was then arthroscoped in a sequential manner.   The aforementioned findings were noted. Attention was directed to the patellofemoral compartment. A chondroplasty of the trochlea was performed. All loose cartilage flaps were removed. There were no sharp edges or unstable fragments after the chondroplasty. There was a small area of exposed bone and a contained defect. The pick was used to produce a microfracture every 3 mm in this area. The abrasion chondroplasty was performed without difficulty. Attention directed to the lateral compartment of the knee. A partial lateral meniscectomy was performed and all the torn portion was removed. At the termination of the partial lateral meniscectomy, the remaining meniscal rim had a smooth contour with no sharp edges or unstable fragments. A chondroplasty of the lateral tibial plateau was performed back to a smooth stable rim. Attention was directed to the medial condylar notch. The osteophyte was smoothed without difficulty. The knee was thoroughly irrigated. Portals were closed. Sterile dressings were applied. Patient was extubated and taken to recovery room in stable condition. EARNEST Adames, assisted during the procedure. He was necessary for patient positioning during the procedure and assistance with the major portions of the operation. His presence decreased the operative time and potential complication rate.       MD TIFFANI Delacruz / HORACE  D: 12/07/2018 08:37     T: 12/07/2018 08:52  JOB #: [id number]

## 2018-12-10 ENCOUNTER — PATIENT OUTREACH (OUTPATIENT)
Dept: OTHER | Age: 58
End: 2018-12-10

## 2018-12-10 NOTE — PROGRESS NOTES
[facility] Telephonic outreach to patient. Patient verified her  and Zip code. Patient recently had a scope on her knee, on  and is recovery from procedure. Reports that she is having minimal pain, using only Ibuprofen every 8 hours. Steri strips are visible, there is some swelling noted at incision. No drainage or redness. Patient applying ice for swelling. Patient stated that she hopes to return to work tomorrow, no restrictions were placed on her. She is exercising and active. Patient reports that she was encouraged to bear weight if possible, not to use assistive devices for ambulation. Patient will follow up with her surgeon as scheduled. This care manager will reach out to patient in approximately 2- 3 weeks to assess progress.

## 2018-12-12 NOTE — BRIEF OP NOTE
BRIEF OPERATIVE NOTE    Date of Procedure: 12/7/2018   Preoperative Diagnosis: Unilateral primary osteoarthritis, right knee [M17.11]  Postoperative Diagnosis: RIGHT KNEE CHONDROMALACIA  LATERAL MENISCUS TEAR    Procedure(s):  RIGHT KNEE ARTHROSCOPY, LATERAL MENISCECTOMY  Surgeon(s) and Role:     * Lorie Kennedy MD - Primary         Surgical Assistant:     Surgical Staff:  Circ-1: Flex Avitia RN  Physician Assistant: EARNEST Ariza  Scrub Tech-1: Robert Kaur  Event Time In Time Out   Incision Start 6565    Incision Close 0803      Anesthesia: General   Estimated Blood Loss: min  Specimens: * No specimens in log *   Findings: men   Complications: none  Implants: * No implants in log *

## 2019-01-02 ENCOUNTER — PATIENT OUTREACH (OUTPATIENT)
Dept: OTHER | Age: 59
End: 2019-01-02

## 2019-01-02 NOTE — PROGRESS NOTES
[facility] Telephonic outreach attempt to follow up with patient. Left a discreet VM. Will continue outreach attempts with this patient.

## 2019-01-22 ENCOUNTER — PATIENT OUTREACH (OUTPATIENT)
Dept: OTHER | Age: 59
End: 2019-01-22

## 2019-01-22 NOTE — LETTER
1/22/2019 2:14 PM 
 
Ms. Rafa Heart [address] [place] [phone] Dear Ms. Liz Becky, My name is Lisa Campos, Employee Care Manager for [employer] and I have been trying to reach you. The Employee Care Management [facility]) program is a free-of-charge confidential service provided to our employees and their family members covered by the [employer]. The program will provide an employee and his/her family with the [employer] expertise to assist in navigating the health care delivery system, provider services, and their overall care needsso as to assure and improve health care interactions and enhance the quality of life. This program is designed to provide you with the opportunity to have a [employer] care manager partner with you for the following services: 
 
 1) when you come home from the hospital or emergency room 2) when help is needed to manage your disease 3) when you need assistance coordinating services or appointments [facility] now partners with [facility]. If you are a qualifying employee, you may receive an additional 10 wellness incentive points for every month of active participation with an Employee Care Manager. [employer] is dedicated to empowering the good health of its community and improving the quality of care and care experiences for employees and their families. We are committed to safeguarding patient confidentiality and privacy, assuring that every employee has the respect he or she deserves in managing their health. The information shared with your care manager will not be shared with anyone else aside from those you identify as part of your care team, and will only be used to assist you with any identified care needs. Please contact me if you would like this service provided to you. Sincerely, AMELIA Smyth RN  Employee Care Manager [facility].  
 [address] [place] [address], [address] [place] [address] [address] [facility] Cell [phone] Fax [email or web address] [place] [email or web address]

## 2019-01-22 NOTE — PROGRESS NOTES
[facility] Telephonic outreach attempt to follow up with patient and assess progress. Left a discreet VM. Will continue attempts to contact patient, will send a UTR letter.

## 2019-02-13 ENCOUNTER — PATIENT OUTREACH (OUTPATIENT)
Dept: OTHER | Age: 59
End: 2019-02-13

## 2019-02-13 NOTE — PROGRESS NOTES
[facility] Telephonic outreach attempt to follow up with patient. Left a discreet VM with a request for a return call. Will continue outreach attempts.

## 2019-02-22 ENCOUNTER — PATIENT OUTREACH (OUTPATIENT)
Dept: OTHER | Age: 59
End: 2019-02-22

## 2019-02-22 NOTE — LETTER
2/22/2019 3:03 PM 
 
Ms. Rob Heart [address] [place] [phone] I have been trying to reach you for a follow up call for services with our Employee Care Management Program. Your wellbeing is very important to us. With continued partnership in the [facility] program, we hope to work with you to optimize your health and increase your quality of life. I look forward to continuing to work with you. Please contact me at your convenience and we can schedule a time that works best for you. Sincerely, AMELIA Hawkins RN  Employee Care Manager [address] [place] [address], [address] [place], [place] [id number] [facility] Cell [phone] Fax [email or web address]

## 2019-07-08 ENCOUNTER — HOSPITAL ENCOUNTER (OUTPATIENT)
Age: 59
Setting detail: OBSERVATION
Discharge: HOME | End: 2019-07-09
Attending: EMERGENCY MEDICINE | Admitting: INTERNAL MEDICINE
Payer: COMMERCIAL

## 2019-07-08 ENCOUNTER — APPOINTMENT (OUTPATIENT)
Dept: MRI IMAGING | Age: 59
End: 2019-07-08
Attending: EMERGENCY MEDICINE
Payer: COMMERCIAL

## 2019-07-08 DIAGNOSIS — R42 VERTIGO: Primary | ICD-10-CM

## 2019-07-08 LAB
ALBUMIN SERPL-MCNC: 4.3 G/DL (ref 3.2–4.6)
ALBUMIN/GLOB SERPL: 1.2 {RATIO} (ref 1.2–3.5)
ALP SERPL-CCNC: 120 U/L (ref 50–136)
ALT SERPL-CCNC: 33 U/L (ref 12–65)
ANION GAP SERPL CALC-SCNC: 13 MMOL/L (ref 7–16)
AST SERPL-CCNC: 21 U/L (ref 15–37)
BASOPHILS # BLD: 0.1 K/UL (ref 0–0.2)
BASOPHILS NFR BLD: 1 % (ref 0–2)
BILIRUB SERPL-MCNC: 0.5 MG/DL (ref 0.2–1.1)
BUN SERPL-MCNC: 15 MG/DL (ref 8–23)
CALCIUM SERPL-MCNC: 9.5 MG/DL (ref 8.3–10.4)
CHLORIDE SERPL-SCNC: 103 MMOL/L (ref 98–107)
CO2 SERPL-SCNC: 24 MMOL/L (ref 21–32)
CREAT SERPL-MCNC: 0.83 MG/DL (ref 0.6–1)
DIFFERENTIAL METHOD BLD: ABNORMAL
EOSINOPHIL # BLD: 0.1 K/UL (ref 0–0.8)
EOSINOPHIL NFR BLD: 1 % (ref 0.5–7.8)
ERYTHROCYTE [DISTWIDTH] IN BLOOD BY AUTOMATED COUNT: 18.3 % (ref 11.9–14.6)
EST. AVERAGE GLUCOSE BLD GHB EST-MCNC: 209 MG/DL
GLOBULIN SER CALC-MCNC: 3.7 G/DL (ref 2.3–3.5)
GLUCOSE BLD STRIP.AUTO-MCNC: 226 MG/DL (ref 65–100)
GLUCOSE BLD STRIP.AUTO-MCNC: 233 MG/DL (ref 65–100)
GLUCOSE SERPL-MCNC: 218 MG/DL (ref 65–100)
HBA1C MFR BLD: 8.9 % (ref 4.8–6)
HCT VFR BLD AUTO: 38.4 % (ref 35.8–46.3)
HGB BLD-MCNC: 11.9 G/DL (ref 11.7–15.4)
IMM GRANULOCYTES # BLD AUTO: 0.1 K/UL (ref 0–0.5)
IMM GRANULOCYTES NFR BLD AUTO: 1 % (ref 0–5)
LYMPHOCYTES # BLD: 2.7 K/UL (ref 0.5–4.6)
LYMPHOCYTES NFR BLD: 31 % (ref 13–44)
MCH RBC QN AUTO: 23.3 PG (ref 26.1–32.9)
MCHC RBC AUTO-ENTMCNC: 31 G/DL (ref 31.4–35)
MCV RBC AUTO: 75.1 FL (ref 79.6–97.8)
MONOCYTES # BLD: 0.6 K/UL (ref 0.1–1.3)
MONOCYTES NFR BLD: 7 % (ref 4–12)
NEUTS SEG # BLD: 5.1 K/UL (ref 1.7–8.2)
NEUTS SEG NFR BLD: 59 % (ref 43–78)
NRBC # BLD: 0 K/UL (ref 0–0.2)
PLATELET # BLD AUTO: 317 K/UL (ref 150–450)
PMV BLD AUTO: 9.6 FL (ref 9.4–12.3)
POTASSIUM SERPL-SCNC: 3 MMOL/L (ref 3.5–5.1)
PROT SERPL-MCNC: 8 G/DL (ref 6.3–8.2)
RBC # BLD AUTO: 5.11 M/UL (ref 4.05–5.2)
SODIUM SERPL-SCNC: 140 MMOL/L (ref 136–145)
WBC # BLD AUTO: 8.7 K/UL (ref 4.3–11.1)

## 2019-07-08 PROCEDURE — 96365 THER/PROPH/DIAG IV INF INIT: CPT | Performed by: EMERGENCY MEDICINE

## 2019-07-08 PROCEDURE — 96375 TX/PRO/DX INJ NEW DRUG ADDON: CPT | Performed by: EMERGENCY MEDICINE

## 2019-07-08 PROCEDURE — 99285 EMERGENCY DEPT VISIT HI MDM: CPT | Performed by: EMERGENCY MEDICINE

## 2019-07-08 PROCEDURE — 80053 COMPREHEN METABOLIC PANEL: CPT

## 2019-07-08 PROCEDURE — 83036 HEMOGLOBIN GLYCOSYLATED A1C: CPT

## 2019-07-08 PROCEDURE — 96366 THER/PROPH/DIAG IV INF ADDON: CPT

## 2019-07-08 PROCEDURE — 77030020253 HC SOL INJ D545NS .05 DEX .45 SAL

## 2019-07-08 PROCEDURE — 96361 HYDRATE IV INFUSION ADD-ON: CPT | Performed by: EMERGENCY MEDICINE

## 2019-07-08 PROCEDURE — 70551 MRI BRAIN STEM W/O DYE: CPT

## 2019-07-08 PROCEDURE — 85025 COMPLETE CBC W/AUTO DIFF WBC: CPT

## 2019-07-08 PROCEDURE — 99218 HC RM OBSERVATION: CPT

## 2019-07-08 PROCEDURE — 77030032490 HC SLV COMPR SCD KNE COVD -B

## 2019-07-08 PROCEDURE — 74011636637 HC RX REV CODE- 636/637: Performed by: INTERNAL MEDICINE

## 2019-07-08 PROCEDURE — 96361 HYDRATE IV INFUSION ADD-ON: CPT

## 2019-07-08 PROCEDURE — 74011250637 HC RX REV CODE- 250/637: Performed by: EMERGENCY MEDICINE

## 2019-07-08 PROCEDURE — 74011250636 HC RX REV CODE- 250/636: Performed by: INTERNAL MEDICINE

## 2019-07-08 PROCEDURE — 99204 OFFICE O/P NEW MOD 45 MIN: CPT | Performed by: PSYCHIATRY & NEUROLOGY

## 2019-07-08 PROCEDURE — 74011250637 HC RX REV CODE- 250/637: Performed by: INTERNAL MEDICINE

## 2019-07-08 PROCEDURE — 82962 GLUCOSE BLOOD TEST: CPT

## 2019-07-08 PROCEDURE — 74011250636 HC RX REV CODE- 250/636: Performed by: EMERGENCY MEDICINE

## 2019-07-08 RX ORDER — INSULIN LISPRO 100 [IU]/ML
INJECTION, SOLUTION INTRAVENOUS; SUBCUTANEOUS
Status: DISCONTINUED | OUTPATIENT
Start: 2019-07-08 | End: 2019-07-09 | Stop reason: HOSPADM

## 2019-07-08 RX ORDER — MECLIZINE HYDROCHLORIDE 25 MG/1
25 TABLET ORAL
Status: DISCONTINUED | OUTPATIENT
Start: 2019-07-08 | End: 2019-07-09 | Stop reason: HOSPADM

## 2019-07-08 RX ORDER — POTASSIUM CHLORIDE 750 MG/1
10 TABLET, EXTENDED RELEASE ORAL 2 TIMES DAILY
Status: DISCONTINUED | OUTPATIENT
Start: 2019-07-08 | End: 2019-07-09 | Stop reason: HOSPADM

## 2019-07-08 RX ORDER — ONDANSETRON 2 MG/ML
4 INJECTION INTRAMUSCULAR; INTRAVENOUS
Status: COMPLETED | OUTPATIENT
Start: 2019-07-08 | End: 2019-07-08

## 2019-07-08 RX ORDER — SODIUM CHLORIDE 0.9 % (FLUSH) 0.9 %
5-40 SYRINGE (ML) INJECTION AS NEEDED
Status: DISCONTINUED | OUTPATIENT
Start: 2019-07-08 | End: 2019-07-09 | Stop reason: HOSPADM

## 2019-07-08 RX ORDER — ATORVASTATIN CALCIUM 10 MG/1
20 TABLET, FILM COATED ORAL DAILY
Status: DISCONTINUED | OUTPATIENT
Start: 2019-07-09 | End: 2019-07-09 | Stop reason: HOSPADM

## 2019-07-08 RX ORDER — HYDROCODONE BITARTRATE AND ACETAMINOPHEN 7.5; 325 MG/1; MG/1
1 TABLET ORAL
Status: DISCONTINUED | OUTPATIENT
Start: 2019-07-08 | End: 2019-07-09 | Stop reason: HOSPADM

## 2019-07-08 RX ORDER — AMLODIPINE BESYLATE 5 MG/1
5 TABLET ORAL DAILY
Status: DISCONTINUED | OUTPATIENT
Start: 2019-07-09 | End: 2019-07-09 | Stop reason: HOSPADM

## 2019-07-08 RX ORDER — SODIUM CHLORIDE 0.9 % (FLUSH) 0.9 %
5-40 SYRINGE (ML) INJECTION EVERY 8 HOURS
Status: DISCONTINUED | OUTPATIENT
Start: 2019-07-08 | End: 2019-07-09 | Stop reason: HOSPADM

## 2019-07-08 RX ORDER — ACETAMINOPHEN 325 MG/1
650 TABLET ORAL
Status: DISCONTINUED | OUTPATIENT
Start: 2019-07-08 | End: 2019-07-09 | Stop reason: HOSPADM

## 2019-07-08 RX ORDER — POTASSIUM CHLORIDE 14.9 MG/ML
20 INJECTION INTRAVENOUS
Status: COMPLETED | OUTPATIENT
Start: 2019-07-08 | End: 2019-07-08

## 2019-07-08 RX ORDER — SODIUM CHLORIDE 9 MG/ML
125 INJECTION, SOLUTION INTRAVENOUS CONTINUOUS
Status: DISPENSED | OUTPATIENT
Start: 2019-07-08 | End: 2019-07-08

## 2019-07-08 RX ORDER — ONDANSETRON 2 MG/ML
4 INJECTION INTRAMUSCULAR; INTRAVENOUS
Status: DISCONTINUED | OUTPATIENT
Start: 2019-07-08 | End: 2019-07-09 | Stop reason: HOSPADM

## 2019-07-08 RX ORDER — DIAZEPAM 2 MG/1
2 TABLET ORAL
Status: COMPLETED | OUTPATIENT
Start: 2019-07-08 | End: 2019-07-08

## 2019-07-08 RX ORDER — LORAZEPAM 2 MG/ML
1 INJECTION INTRAMUSCULAR
Status: COMPLETED | OUTPATIENT
Start: 2019-07-08 | End: 2019-07-08

## 2019-07-08 RX ORDER — DEXTROSE, SODIUM CHLORIDE, SODIUM LACTATE, POTASSIUM CHLORIDE, AND CALCIUM CHLORIDE 5; .6; .31; .03; .02 G/100ML; G/100ML; G/100ML; G/100ML; G/100ML
125 INJECTION, SOLUTION INTRAVENOUS CONTINUOUS
Status: DISCONTINUED | OUTPATIENT
Start: 2019-07-08 | End: 2019-07-09 | Stop reason: HOSPADM

## 2019-07-08 RX ADMIN — POTASSIUM CHLORIDE 20 MEQ: 200 INJECTION, SOLUTION INTRAVENOUS at 13:04

## 2019-07-08 RX ADMIN — POTASSIUM CHLORIDE 20 MEQ: 200 INJECTION, SOLUTION INTRAVENOUS at 15:32

## 2019-07-08 RX ADMIN — Medication 5 ML: at 22:44

## 2019-07-08 RX ADMIN — LORAZEPAM 1 MG: 2 INJECTION INTRAMUSCULAR; INTRAVENOUS at 08:01

## 2019-07-08 RX ADMIN — SODIUM CHLORIDE, SODIUM LACTATE, POTASSIUM CHLORIDE, CALCIUM CHLORIDE, AND DEXTROSE MONOHYDRATE 125 ML/HR: 600; 310; 30; 20; 5 INJECTION, SOLUTION INTRAVENOUS at 13:04

## 2019-07-08 RX ADMIN — INSULIN LISPRO 6 UNITS: 100 INJECTION, SOLUTION INTRAVENOUS; SUBCUTANEOUS at 17:24

## 2019-07-08 RX ADMIN — SODIUM CHLORIDE 125 ML/HR: 900 INJECTION, SOLUTION INTRAVENOUS at 07:58

## 2019-07-08 RX ADMIN — INSULIN LISPRO 6 UNITS: 100 INJECTION, SOLUTION INTRAVENOUS; SUBCUTANEOUS at 22:43

## 2019-07-08 RX ADMIN — DIAZEPAM 2 MG: 2 TABLET ORAL at 06:05

## 2019-07-08 RX ADMIN — ACETAMINOPHEN 650 MG: 325 TABLET, FILM COATED ORAL at 14:37

## 2019-07-08 RX ADMIN — POTASSIUM CHLORIDE 10 MEQ: 10 TABLET, EXTENDED RELEASE ORAL at 17:08

## 2019-07-08 RX ADMIN — HYDROCODONE BITARTRATE AND ACETAMINOPHEN 1 TABLET: 7.5; 325 TABLET ORAL at 22:53

## 2019-07-08 RX ADMIN — ONDANSETRON 4 MG: 2 INJECTION INTRAMUSCULAR; INTRAVENOUS at 06:05

## 2019-07-08 NOTE — LETTER
[facility] EMERGENCY DEPT One [address] [address] 
[phone] Work/School Note Date: 7/8/2019 To Whom It May concern: 
 
Gera Reveles was present today in the ED with a family member who was admitted to the hospital today. The ED physician is Dr. Igor Gómez and his work phone number is [phone]. Sincerely, Alfredo Cervantes RN

## 2019-07-08 NOTE — ED NOTES
Patient in bed resting quietly. Son at bedside. Denies any needs at this time. Vital signs stable and cycling.

## 2019-07-08 NOTE — ED PROVIDER NOTES
[facility] Emergency Department  Arrival Date/Time: 7/8/2019  5:49 AM      Danielle Milan  MRN: [id number]    YOB: 1960   59 y.o. 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 No specific activity or movement immediately prior to the dizziness    Feels like she is falling    No alleviating. Persist even with eyes closed while sitting still. Much worse when she lays down. Much worse with position changes. HPI    Review of Systems: Review of Systems   Constitutional: Positive for activity change. Negative for fever and unexpected weight change. HENT: Negative. Eyes: Negative. Respiratory: Negative for chest tightness and shortness of breath. Cardiovascular: Positive for leg swelling. Negative for chest pain and palpitations. Gastrointestinal: Positive for nausea and vomiting. Genitourinary: Negative. Musculoskeletal: Negative. Skin: Positive for pallor. Neurological: Positive for dizziness. Negative for seizures, syncope, speech difficulty and numbness. Psychiatric/Behavioral: Negative. Past Medical History: Primary Care Doctor: Fariba Xiong MD  Meds, PMH, PSHx, SocHx at end of this note     Allergies: Allergies   Allergen Reactions    Codeine Itching    Meperidine Other (comments)     Hallucinations         Key Anti-Platelet Anticoagulant Meds     The patient is on no antiplatelet meds or anticoagulants. Physical Exam:  Nursing documentation reviewed. Vitals:    07/08/19 0844 07/08/19 0913 07/08/19 0944 07/08/19 1150   BP: 117/56 123/60 112/56 145/73   Pulse: 80 85 92 97   Resp:       Temp:       SpO2: 91% 92% 96% 93%    Vital signs were reviewed.    Physical Exam   Constitutional: She is oriented to person, place, and time. She appears well-developed and well-nourished. She appears distressed. HENT:   Head: Normocephalic and atraumatic. Cardiovascular: Normal rate and regular rhythm. Pulmonary/Chest: Breath sounds normal. No stridor. No respiratory distress. Abdominal: Soft. Bowel sounds are normal. She exhibits distension. There is tenderness. Neurological: She is alert and oriented to person, place, and time. Positive nystagmus with left-sided gaze. Abnormal head impulse testing  Normal test of skew   Skin: Skin is warm and dry. Capillary refill takes less than 2 seconds. Psychiatric: She has a normal mood and affect. Her behavior is normal.   Nursing note and vitals reviewed. MEDICAL DECISION MAKING:   Differential Diagnosis:    MDM  Number of Diagnoses or Management Options  Diagnosis management comments: 58-year-old female with profound dizziness. MRI of the brain was negative for an acute process    On recheck at [phone] she is still profoundly dizzy. She ambulated to the bathroom but requires significant assistance. Neurology will see the patient    We will consult hospitalist for admission. Will start IV fluids. Replace her potassium.         Data:      Lab findings during this visit (only abnormal values will be noted, if no value noted then the result   was normal range):   Labs Reviewed   CBC WITH AUTOMATED DIFF - Abnormal; Notable for the following components:       Result Value    MCV 75.1 (*)     MCH 23.3 (*)     MCHC 31.0 (*)     RDW 18.3 (*)     All other components within normal limits   METABOLIC PANEL, COMPREHENSIVE - Abnormal; Notable for the following components:    Potassium 3.0 (*)     Glucose 218 (*)     Globulin 3.7 (*)     All other components within normal limits        Radiology studies during this visit: Mri Brain Wo Cont    Result Date: 7/8/2019  IMPRESSION: Unremarkable MRI brain without contrast. Date Of Dictation: 7/8/2019 11:11 AM        Medications given in the ED:   Medications   0.9% sodium chloride infusion (125 mL/hr IntraVENous New Bag 19 0758)   ondansetron (ZOFRAN) injection 4 mg (4 mg IntraVENous Given 19)   diazePAM (VALIUM) tablet 2 mg (2 mg Oral Given 19)   LORazepam (ATIVAN) injection 1 mg (1 mg IntraVENous Given 19 08)     Procedure Documentation: Procedures     Assessment and Plan:      Impression:     ICD-10-CM ICD-9-CM   1. Vertigo R42 780.4        Disposition: Admitted      Follow-up:   Follow-up Information    None          Discharge Medications:   Current Discharge Medication List           Sara Mcclellan MD; 19  6:34 AM      Other ED Course Notes:     ED Course as of  1237   Mon 2019   1045 D/w Dr. Patrick Carson- will see after MRI complete    [GH]      ED Course User Index  [GH] Mynor Valiente MD        Past Medical History:      Past Medical History:   Diagnosis Date    Anxiety     no meds at present    Asthma     prn inhaler    Depression     no meds at present    Diabetes mellitus type 2, controlled, without complications ([address]     type 2-- sqbs avg am- ---  no hypo    Dyslipidemia 2010    Hypertension     controlled with med    Migraines     Right knee pain     SVT (supraventricular tachycardia) ([address]     pt states not recent problem-- none x 2 yrs-- no meds-- per pt-- cough would stop it    Vertigo     Vitamin D deficiency     resolved--- not a recent issue     Past Surgical History:   Procedure Laterality Date    HX  SECTION      HX LAP CHOLECYSTECTOMY      HX MICHI AND BSO  2001    Hysterectomy    HX TONSILLECTOMY  as child    HX TUBAL LIGATION Bilateral          Social History     Tobacco Use    Smoking status: Never Smoker    Smokeless tobacco: Never Used   Substance Use Topics    Alcohol use: No    Drug use: No      Home Medication:   Prior to Admission Medications   Prescriptions Last Dose Informant Patient Reported? Taking?    SITagliptin (JANUVIA) 100 mg tablet   No No   Sig: Take 1 Tab by mouth daily. albuterol (VENTOLIN HFA) 90 mcg/actuation inhaler   No No   Sig: Take 2 Puffs by inhalation every four (4) hours as needed for Wheezing. amLODIPine (NORVASC) 5 mg tablet   No No   Sig: Take 1 Tab by mouth daily. atorvastatin (LIPITOR) 20 mg tablet   No No   Sig: Take 1 Tab by mouth daily. dapagliflozin (FARXIGA) 10 mg tab tablet   No No   Sig: Take 1 Tab by mouth daily. fluticasone-salmeterol (ADVAIR DISKUS) 250-50 mcg/dose diskus inhaler   No No   Sig: Take 1 Puff by inhalation every twelve (12) hours. Patient taking differently: Take 1 Puff by inhalation two (2) times daily as needed. levalbuterol (XOPENEX) 1.25 mg/3 mL nebu   No No   Sig: 3 mL by Nebulization route every four (4) hours as needed. losartan-hydroCHLOROthiazide (HYZAAR) 100-25 mg per tablet   No No   Sig: Take 1 Tab by mouth daily. meclizine (ANTIVERT) 25 mg tablet   No No   Sig: Take 1 Tab by mouth three (3) times daily as needed. omega-3 acid ethyl esters (LOVAZA) 1 gram capsule   No No   Sig: Take 2 Caps by mouth two (2) times a day. ondansetron (ZOFRAN ODT) 4 mg disintegrating tablet   No No   Sig: Take 1 tablet by mouth every eight (8) hours as needed. potassium chloride SA (MICRO-K) 10 mEq capsule   No No   Sig: Take 1 Cap by mouth two (2) times a day.       Facility-Administered Medications: None

## 2019-07-08 NOTE — PROGRESS NOTES
Checked briefly on patient  She was resting peacefully  One of her co-workers was with her  Will follow up    Steve Vargas, staff [address], [facility]  /   [email or web address]

## 2019-07-08 NOTE — H&P
HOSPITALIST H&P  NAME:  Zaida Mclain   Age:  59 y.o.  :   1960   MRN:   [id number]  PCP: Ya Bradshaw MD  Consulting MD:  Treatment Team: Attending Provider: Dorothy Bourgeois MD  HPI:   Hussein Kat is a 60 yo F with history of vertigo for last 15 years, T2DM, HTN, and diastolic dysfunction, who presents for severe, abrupt dizziness and vomiting. Started about ~4 AM and noticed being off balance to the L side. 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 Then dizziness started and she vomited 3 times. Dizziness worsened by turning head in either direction. Presently, she states she is feeling a little better, but still dizzy with a headache. She has chronic tinnitus (sounds like a tire being deflated in both ears) and is 'seeing double. '  MRI done and negative for CVA. Did have episode of vertigo about 2 weeks that lasted 4 days. Takes meclizine and zofran prn, but meclizine will cause her to sleep all day. She has never done any vestibular rehab/PT. Hospitalist service asked to place in observation due to severity of symptoms.     Complete ROS done and is as stated in HPI or otherwise negative  Past Medical History:   Diagnosis Date    Anxiety     no meds at present    Asthma     prn inhaler    Depression     no meds at present    Diabetes mellitus type 2, controlled, without complications ([address]     type 2-- sqbs avg am- ---  no hypo    Dyslipidemia 2010    Hypertension     controlled with med    Migraines     Right knee pain     SVT (supraventricular tachycardia) ([address]     pt states not recent problem-- none x 2 yrs-- no meds-- per pt-- cough would stop it    Vertigo     Vitamin D deficiency     resolved--- not a recent issue      Past Surgical History:   Procedure Laterality Date    HX  SECTION      HX LAP CHOLECYSTECTOMY      HX MICHI AND BSO  2001    Hysterectomy    HX TONSILLECTOMY  as child    HX TUBAL LIGATION Bilateral     1995      Prior to Admission Medications   Prescriptions Last Dose Informant Patient Reported? Taking? SITagliptin (JANUVIA) 100 mg tablet   No No   Sig: Take 1 Tab by mouth daily. albuterol (VENTOLIN HFA) 90 mcg/actuation inhaler   No No   Sig: Take 2 Puffs by inhalation every four (4) hours as needed for Wheezing. amLODIPine (NORVASC) 5 mg tablet   No No   Sig: Take 1 Tab by mouth daily. atorvastatin (LIPITOR) 20 mg tablet   No No   Sig: Take 1 Tab by mouth daily. dapagliflozin (FARXIGA) 10 mg tab tablet   No No   Sig: Take 1 Tab by mouth daily. fluticasone-salmeterol (ADVAIR DISKUS) 250-50 mcg/dose diskus inhaler   No No   Sig: Take 1 Puff by inhalation every twelve (12) hours. Patient taking differently: Take 1 Puff by inhalation two (2) times daily as needed. levalbuterol (XOPENEX) 1.25 mg/3 mL nebu   No No   Sig: 3 mL by Nebulization route every four (4) hours as needed. losartan-hydroCHLOROthiazide (HYZAAR) 100-25 mg per tablet   No No   Sig: Take 1 Tab by mouth daily. meclizine (ANTIVERT) 25 mg tablet   No No   Sig: Take 1 Tab by mouth three (3) times daily as needed. omega-3 acid ethyl esters (LOVAZA) 1 gram capsule   No No   Sig: Take 2 Caps by mouth two (2) times a day. ondansetron (ZOFRAN ODT) 4 mg disintegrating tablet   No No   Sig: Take 1 tablet by mouth every eight (8) hours as needed. potassium chloride SA (MICRO-K) 10 mEq capsule   No No   Sig: Take 1 Cap by mouth two (2) times a day.       Facility-Administered Medications: None     Allergies   Allergen Reactions    Codeine Itching    Meperidine Other (comments)     Hallucinations      Social History     Tobacco Use    Smoking status: Never Smoker    Smokeless tobacco: Never Used   Substance Use Topics    Alcohol use: No      Family History   Problem Relation Age of Onset    Cancer Mother         colon    Elevated Lipids Mother     Cancer Father         colon    Diabetes Father         T2DM  Other Brother         sleep apnea      Objective:     Visit Vitals  /83 (BP 1 Location: Left arm, BP Patient Position: At rest)   Pulse 84   Temp 98.2 °F (36.8 °C)   Resp 18   Ht 5' 3\" (1.6 m)   Wt 74.4 kg (164 lb)   SpO2 93%   BMI 29.05 kg/m²      Temp (24hrs), Av.1 °F (36.7 °C), Min:97.9 °F (36.6 °C), Max:98.2 °F (36.8 °C)    Oxygen Therapy  O2 Sat (%): 93 % (19 1358)  Pulse via Oximetry: 97 beats per minute (19 1150)  O2 Device: Room air (19 1477)  Physical Exam:  General:    Alert, cooperative, no distress but appears uncomfortable from dizziness   Head:   Normocephalic, without obvious abnormality, atraumatic. Ears:    TM normal bilaterally without erythema   Nose:  Nares normal. No drainage or sinus tenderness. Lungs:   Clear to auscultation bilaterally. No Wheezing or Rhonchi. No rales. Heart:   Regular rate and rhythm,  no murmur, rub or gallop. Abdomen:   Soft, non-tender. Not distended. Bowel sounds normal.   Extremities: No cyanosis. No edema. No clubbing  Skin:     Texture, turgor normal. No rashes or lesions.   Not Jaundiced  Neurologic: Alert and oriented x 3, no focal deficits, PERRL, EOMI, patient with problems focusing her eyes, dizziness worsened with sitting patient up in bed (full Fence-Hallpike maneuver not done)  Data Review:   Recent Results (from the past 24 hour(s))   CBC WITH AUTOMATED DIFF    Collection Time: 19  5:57 AM   Result Value Ref Range    WBC 8.7 4.3 - 11.1 K/uL    RBC 5.11 4.05 - 5.2 M/uL    HGB 11.9 11.7 - 15.4 g/dL    HCT 38.4 35.8 - 46.3 %    MCV 75.1 (L) 79.6 - 97.8 FL    MCH 23.3 (L) 26.1 - 32.9 PG    MCHC 31.0 (L) 31.4 - 35.0 g/dL    RDW 18.3 (H) 11.9 - 14.6 %    PLATELET 535 725 - 320 K/uL    MPV 9.6 9.4 - 12.3 FL    ABSOLUTE NRBC 0.00 0.0 - 0.2 K/uL    DF AUTOMATED      NEUTROPHILS 59 43 - 78 %    LYMPHOCYTES 31 13 - 44 %    MONOCYTES 7 4.0 - 12.0 %    EOSINOPHILS 1 0.5 - 7.8 %    BASOPHILS 1 0.0 - 2.0 %    IMMATURE GRANULOCYTES 1 0.0 - 5.0 %    ABS. NEUTROPHILS 5.1 1.7 - 8.2 K/UL    ABS. LYMPHOCYTES 2.7 0.5 - 4.6 K/UL    ABS. MONOCYTES 0.6 0.1 - 1.3 K/UL    ABS. EOSINOPHILS 0.1 0.0 - 0.8 K/UL    ABS. BASOPHILS 0.1 0.0 - 0.2 K/UL    ABS. IMM. GRANS. 0.1 0.0 - 0.5 K/UL   METABOLIC PANEL, COMPREHENSIVE    Collection Time: 07/08/19  5:57 AM   Result Value Ref Range    Sodium 140 136 - 145 mmol/L    Potassium 3.0 (L) 3.5 - 5.1 mmol/L    Chloride 103 98 - 107 mmol/L    CO2 24 21 - 32 mmol/L    Anion gap 13 7 - 16 mmol/L    Glucose 218 (H) 65 - 100 mg/dL    BUN 15 8 - 23 MG/DL    Creatinine 0.83 0.6 - 1.0 MG/DL    GFR est AA >60 >60 ml/min/1.73m2    GFR est non-AA >60 >60 ml/min/1.73m2    Calcium 9.5 8.3 - 10.4 MG/DL    Bilirubin, total 0.5 0.2 - 1.1 MG/DL    ALT (SGPT) 33 12 - 65 U/L    AST (SGOT) 21 15 - 37 U/L    Alk. phosphatase 120 50 - 136 U/L    Protein, total 8.0 6.3 - 8.2 g/dL    Albumin 4.3 3.2 - 4.6 g/dL    Globulin 3.7 (H) 2.3 - 3.5 g/dL    A-G Ratio 1.2 1.2 - 3.5       Imaging /Procedures /Studies   MRI BRAIN WO CONT   Final Result   IMPRESSION:       Unremarkable MRI brain without contrast.      Date Of Dictation: 7/8/2019 11:11 AM             Assessment and Plan: Active Hospital Problems    Diagnosis Date Noted    Vertigo 07/08/2019    Type 2 diabetes mellitus with diabetic neuropathy, without long-term current use of insulin ([facility] 09/19/2016    Hypertension        PLAN  · Admit as observation to remote telemetry. · IVF, meclizine, anti-emetics for symptoms  · PT consult tomorrow to assess gait and work on vestibular exercises  · Provide SSI while here  · Continue home dose anti-hypertensive meds  · Clear liquid diet for nausea- advance as tolerated  · Needs ENT evaluation and vestibular rehab as outpatient. · Seen by neurology and nothing to add from their standpoint. · Handout for vertigo exercises provided to her that she can try when her symptoms improve.     Admission plan discussed with patient and her son, Rodolfo Cockayne, at bedside. Code Status: Full    Estimated LOS:  < 2 midnights    Signed By: Nakul Luna.  Gricel Fleming MD     July 8, 2019

## 2019-07-08 NOTE — ED NOTES
Bedside report received from [facility]. Patient in bed with eyes closed. Call light within reach. Patient denies any complaints at this time.

## 2019-07-08 NOTE — ED TRIAGE NOTES
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 Pt vomited x3 while being transferred to room via bed. Pt has hx of vertigo, takes meclizine and Zofran. Pt denies NVD, CP, or feeling unwell recently. Two weeks ago had another vertigo episode.

## 2019-07-08 NOTE — PROGRESS NOTES
07/08/19 1414   Dual Skin Pressure Injury Assessment   Dual Skin Pressure Injury Assessment WDL   Second Care Provider (Based on [address] [place] [address]) Kai Florian RN   Skin Integumentary   Skin Integumentary (WDL) WDL   Skin Color Appropriate for ethnicity   Skin Condition/Temp Dry; Warm   Skin Integrity Intact   Turgor Non-tenting   Hair Growth Present   Varicosities Absent

## 2019-07-08 NOTE — ROUTINE PROCESS
TRANSFER - OUT REPORT: 
 
Verbal report given to Zoya Mcdonald RN on Osmani Hernandez  being transferred to room 720 for routine progression of care Report consisted of patients Situation, Background, Assessment and  
Recommendations(SBAR). Information from the following report(s) ED Summary was reviewed with the receiving nurse. Lines:  
Peripheral IV 07/08/19 Right Antecubital (Active) Site Assessment Clean, dry, & intact 7/8/2019  5:53 AM  
Phlebitis Assessment 0 7/8/2019  5:53 AM  
Infiltration Assessment 0 7/8/2019  5:53 AM  
Dressing Status Clean, dry, & intact 7/8/2019  5:53 AM  
Dressing Type Transparent 7/8/2019  5:53 AM  
Hub Color/Line Status Pink 7/8/2019  5:53 AM  
  
 
Opportunity for questions and clarification was provided.

## 2019-07-09 VITALS
RESPIRATION RATE: 18 BRPM | SYSTOLIC BLOOD PRESSURE: 113 MMHG | DIASTOLIC BLOOD PRESSURE: 63 MMHG | HEIGHT: 63 IN | OXYGEN SATURATION: 93 % | TEMPERATURE: 97.6 F | WEIGHT: 164 LBS | HEART RATE: 100 BPM | BODY MASS INDEX: 29.06 KG/M2

## 2019-07-09 LAB
ANION GAP SERPL CALC-SCNC: 8 MMOL/L (ref 7–16)
BUN SERPL-MCNC: 10 MG/DL (ref 8–23)
CALCIUM SERPL-MCNC: 9.2 MG/DL (ref 8.3–10.4)
CHLORIDE SERPL-SCNC: 107 MMOL/L (ref 98–107)
CO2 SERPL-SCNC: 26 MMOL/L (ref 21–32)
CREAT SERPL-MCNC: 0.74 MG/DL (ref 0.6–1)
GLUCOSE BLD STRIP.AUTO-MCNC: 196 MG/DL (ref 65–100)
GLUCOSE BLD STRIP.AUTO-MCNC: 271 MG/DL (ref 65–100)
GLUCOSE BLD STRIP.AUTO-MCNC: 283 MG/DL (ref 65–100)
GLUCOSE SERPL-MCNC: 149 MG/DL (ref 65–100)
POTASSIUM SERPL-SCNC: 3.8 MMOL/L (ref 3.5–5.1)
SODIUM SERPL-SCNC: 141 MMOL/L (ref 136–145)

## 2019-07-09 PROCEDURE — 74011636637 HC RX REV CODE- 636/637: Performed by: INTERNAL MEDICINE

## 2019-07-09 PROCEDURE — 36415 COLL VENOUS BLD VENIPUNCTURE: CPT

## 2019-07-09 PROCEDURE — 74011250636 HC RX REV CODE- 250/636: Performed by: INTERNAL MEDICINE

## 2019-07-09 PROCEDURE — 96375 TX/PRO/DX INJ NEW DRUG ADDON: CPT

## 2019-07-09 PROCEDURE — 97162 PT EVAL MOD COMPLEX 30 MIN: CPT

## 2019-07-09 PROCEDURE — 99218 HC RM OBSERVATION: CPT

## 2019-07-09 PROCEDURE — 80048 BASIC METABOLIC PNL TOTAL CA: CPT

## 2019-07-09 PROCEDURE — 96376 TX/PRO/DX INJ SAME DRUG ADON: CPT

## 2019-07-09 PROCEDURE — 74011250637 HC RX REV CODE- 250/637: Performed by: INTERNAL MEDICINE

## 2019-07-09 PROCEDURE — 96361 HYDRATE IV INFUSION ADD-ON: CPT

## 2019-07-09 PROCEDURE — 82962 GLUCOSE BLOOD TEST: CPT

## 2019-07-09 PROCEDURE — 74011250636 HC RX REV CODE- 250/636: Performed by: NURSE PRACTITIONER

## 2019-07-09 RX ORDER — KETOROLAC TROMETHAMINE 15 MG/ML
15 INJECTION, SOLUTION INTRAMUSCULAR; INTRAVENOUS ONCE
Status: COMPLETED | OUTPATIENT
Start: 2019-07-09 | End: 2019-07-09

## 2019-07-09 RX ORDER — ONDANSETRON 4 MG/1
4 TABLET, ORALLY DISINTEGRATING ORAL
Qty: 20 TAB | Refills: 1 | Status: SHIPPED | OUTPATIENT
Start: 2019-07-09 | End: 2019-07-14

## 2019-07-09 RX ORDER — KETOROLAC TROMETHAMINE 15 MG/ML
15 INJECTION, SOLUTION INTRAMUSCULAR; INTRAVENOUS
Status: DISCONTINUED | OUTPATIENT
Start: 2019-07-09 | End: 2019-07-09 | Stop reason: HOSPADM

## 2019-07-09 RX ADMIN — POTASSIUM CHLORIDE 10 MEQ: 10 TABLET, EXTENDED RELEASE ORAL at 17:17

## 2019-07-09 RX ADMIN — KETOROLAC TROMETHAMINE 15 MG: 15 INJECTION, SOLUTION INTRAMUSCULAR; INTRAVENOUS at 10:26

## 2019-07-09 RX ADMIN — KETOROLAC TROMETHAMINE 15 MG: 15 INJECTION, SOLUTION INTRAMUSCULAR; INTRAVENOUS at 14:59

## 2019-07-09 RX ADMIN — ACETAMINOPHEN 650 MG: 325 TABLET, FILM COATED ORAL at 06:10

## 2019-07-09 RX ADMIN — Medication 10 ML: at 14:59

## 2019-07-09 RX ADMIN — INSULIN LISPRO 9 UNITS: 100 INJECTION, SOLUTION INTRAVENOUS; SUBCUTANEOUS at 17:17

## 2019-07-09 RX ADMIN — ATORVASTATIN CALCIUM 20 MG: 10 TABLET, FILM COATED ORAL at 08:11

## 2019-07-09 RX ADMIN — MECLIZINE HYDROCHLORIDE 25 MG: 25 TABLET ORAL at 06:10

## 2019-07-09 RX ADMIN — Medication 5 ML: at 06:11

## 2019-07-09 RX ADMIN — AMLODIPINE BESYLATE 5 MG: 5 TABLET ORAL at 08:11

## 2019-07-09 RX ADMIN — INSULIN LISPRO 2 UNITS: 100 INJECTION, SOLUTION INTRAVENOUS; SUBCUTANEOUS at 08:11

## 2019-07-09 RX ADMIN — ONDANSETRON 4 MG: 2 INJECTION INTRAMUSCULAR; INTRAVENOUS at 07:12

## 2019-07-09 RX ADMIN — ONDANSETRON 4 MG: 2 INJECTION INTRAMUSCULAR; INTRAVENOUS at 12:03

## 2019-07-09 RX ADMIN — POTASSIUM CHLORIDE 10 MEQ: 10 TABLET, EXTENDED RELEASE ORAL at 08:11

## 2019-07-09 RX ADMIN — SODIUM CHLORIDE, SODIUM LACTATE, POTASSIUM CHLORIDE, CALCIUM CHLORIDE, AND DEXTROSE MONOHYDRATE 125 ML/HR: 600; 310; 30; 20; 5 INJECTION, SOLUTION INTRAVENOUS at 06:10

## 2019-07-09 RX ADMIN — HYDROCODONE BITARTRATE AND ACETAMINOPHEN 1 TABLET: 7.5; 325 TABLET ORAL at 08:11

## 2019-07-09 RX ADMIN — MECLIZINE HYDROCHLORIDE 25 MG: 25 TABLET ORAL at 14:59

## 2019-07-09 RX ADMIN — HYDROCHLOROTHIAZIDE: 25 TABLET ORAL at 08:11

## 2019-07-09 RX ADMIN — INSULIN LISPRO 9 UNITS: 100 INJECTION, SOLUTION INTRAVENOUS; SUBCUTANEOUS at 12:00

## 2019-07-09 NOTE — PROGRESS NOTES
LMSW met with pt about care planning. Pt[facility] so she needs a new PCP. Emailed referral to [facility] PCP placement, Noreen Lin for pt to be referred to a PCP near her home and/or [facility]. Also placed referral for follow up  Out Pt therapy for Vestibular therapy as recommended by PT. Pt should be contacted with both of these follow up appt times  . Pt anticipates no other supportive care needs. Care Management Interventions  PCP Verified by CM: ( PCP referral)  Mode of Transport at Discharge: (family)  Transition of Care Consult (CM Consult): Discharge Planning(Pt is employed at [facility] [employer] and insured by [employer]. )  Discharge Durable Medical Equipment: No  Physical Therapy Consult: Yes  Occupational Therapy Consult: No  Speech Therapy Consult: No  Current Support Network: Own Home, Family Lives Nearby(Pt normally manages all  . ADL's)  Confirm Follow Up Transport: Family(family until she is ok to drive)  Plan discussed with Pt/Family/Caregiver: Yes  Freedom of Choice Offered: Yes   Resource Information Provided?: No  Discharge Location  Discharge Placement: Home with outpatient services(Referral placed to [facility] Out Pt Therapy for Vestibular therapy follow up post discharge.  )

## 2019-07-09 NOTE — ROUTINE PROCESS
Discharge paperwork given to patient. All question answered and IV DC'd. Patient waiting for son to pick her up.

## 2019-07-09 NOTE — PROGRESS NOTES
PHYSICAL THERAPY: Initial Assessment 7/9/2019  OBSERVATION:    Payor: Arron Murcia / Plan: Maryse Em / Product Type: PPO /       NAME/AGE/GENDER: Jeremy Gutierrez is a 59 y.o. female   PRIMARY DIAGNOSIS: Vertigo [R42] Vertigo   Vertigo          ICD-10: Treatment Diagnosis: Dizziness and giddiness (R42)     ·    Precaution/Allergies:  Codeine and Meperidine      ASSESSMENT:     Ms. Caridad Branch presents with 15+ year history of intermittent sudden and severe vertigo that lasts anywhere from 1 to 4 days. Pt has not had any therapy nor has she seen ENT for this ever. Pt has tinnitis in both ears. Pt states she does not hear as well as she used to but does not know if she has any hearing loss. Pt states her bouts with vertigo have gotten more severe over the years. All of these symptoms are consistent with Meniere's disease which does not respond to physical therapy during a flare up. Pt reports history of migraine with visual aura as well as tunnel vision and what sounds like a rainbow scotoma with this instance of vertigo. Pt reports she has problem with potassium as well. Pt does state that today when she turns her head left she gets some vertigo. This could be BPPV but to test for and treat today would be difficult to rule out definitively as the other sounds like Meniere's disease. She is experiencing a migraine today as well. Acute care PT is not indicated at this time. I recommend ENT consult and follow up OPPT for vestibular rehab as needed. This section established at most recent assessment   PROBLEM LIST (Impairments causing functional limitations): 1. vertigo    INTERVENTIONS PLANNED: (Benefits and precautions of physical therapy have been discussed with the patient.)  1. No acute care intervention         REHAB RECOMMENDATIONS (at time of discharge pending progress):    Placement: It is my opinion, based on this patient's performance to date, that Ms. Caridad Branch may benefit from OUTPATIENT THERAPY after discharge due to the functional deficits listed above that are likely to improve with skilled rehabilitation because of possible persisitent dizziness and or balance loss . Equipment:    None at this time              HISTORY:   History of Present Injury/Illness (Reason for Referral):  Paulina Hua is a 60 yo F with history of vertigo for last 15 years, T2DM, HTN, and diastolic dysfunction, who presents for severe, abrupt dizziness and vomiting. Started about ~4 AM and noticed being off balance to the L side. 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 Then dizziness started and she vomited 3 times. Dizziness worsened by turning head in either direction. Presently, she states she is feeling a little better, but still dizzy with a headache. She has chronic tinnitus (sounds like a tire being deflated in both ears) and is 'seeing double. '  MRI done and negative for CVA. Did have episode of vertigo about 2 weeks that lasted 4 days. Takes meclizine and zofran prn, but meclizine will cause her to sleep all day. She has never done any vestibular rehab/PT. Hospitalist service asked to place in observation due to severity of symptoms    Past Medical History/Comorbidities:   Ms. Caridad Branch  has a past medical history of Anxiety, Asthma, Depression, Diabetes mellitus type 2, controlled, without complications ([address] Dyslipidemia (2010), Hypertension, Migraines, Right knee pain, SVT (supraventricular tachycardia) ([address] Vertigo, and Vitamin D deficiency. Ms. Caridad Branch  has a past surgical history that includes hx tonsillectomy (as child); hx lap cholecystectomy (); hx  section (); hx tubal ligation (Bilateral); and hx kash and bso ().   Social History/Living Environment:   Home Environment: Trailer/mobile home  One/Two Story Residence: One story  Living Alone: No  Support Systems: Family member(s)  Patient Expects to be Discharged to[de-identified] Trailer/mobile home  Current DME Used/Available at Home: Cane, quad  Prior Level of Function/Work/Activity:  Works at a  in the ER     Number of Personal Factors/Comorbidities that affect the Plan of Care: 3+: HIGH COMPLEXITY   EXAMINATION:   Most Recent Physical Functioning:  DID NOT TEST. Gross Assessment:   DID NOT TEST               Posture:     Balance:    Bed Mobility:     Wheelchair Mobility:     Transfers:     Gait:            Body Structures Involved:  1. Eyes and Ears  2. Muscles  3. Ligaments Body Functions Affected:  1. Sensory/Pain  2. Neuromusculoskeletal  3. Movement Related Activities and Participation Affected:  1. Learning and Applying Knowledge  2. General Tasks and Demands  3. Mobility  4. Self Care  5. Domestic Life  6. Community, Social and [facility] IT[facility]    Number of elements that affect the Plan of Care: 4+: HIGH COMPLEXITY   CLINICAL PRESENTATION:   Presentation: Evolving clinical presentation with unstable and unpredictable characteristics: HIGH COMPLEXITY   CLINICAL DECISION MAKIN [facility] Mobility Inpatient Short Form  How much difficulty does the patient currently have. .. Unable A Lot A Little None   1. Turning over in bed (including adjusting bedclothes, sheets and blankets)? ? 1   ? 2   ? 3   ? 4   2. Sitting down on and standing up from a chair with arms ( e.g., wheelchair, bedside commode, etc.)   ? 1   ? 2   ? 3   ? 4   3. Moving from lying on back to sitting on the side of the bed?   ? 1   ? 2   ? 3   ? 4   How much help from another person does the patient currently need. .. Total A Lot A Little None   4. Moving to and from a bed to a chair (including a wheelchair)? ? 1   ? 2   ? 3   ? 4   5. Need to walk in hospital room? ? 1   ? 2   ? 3   ? 4   6. Climbing 3-5 steps with a railing? ? 1   ? 2   ? 3   ? 4   © 2007, Trustees of [address] [place] [address] [address] [id number], under license to [employer].  All rights reserved      Score:  Initial: 12 - Most Recent: X (Date: -- )    Interpretation of Tool:  Represents activities that are increasingly more difficult (i.e. Bed mobility, Transfers, Gait). Medical Necessity:     · ACUTE CARE PT IS NOT INDICATED AT THIS TIME.  SYMPTOMS ARE TOO SEVERE. Reason for Services/Other Comments:  · MAY BENEFIT FROM OPPT FOR VESTIBULAR REHAB IF NEEDED. Use of outcome tool(s) and clinical judgement create a POC that gives a: Questionable prediction of patient's progress: MODERATE COMPLEXITY            TREATMENT:   (In addition to Assessment/Re-Assessment sessions the following treatments were rendered)   Pre-treatment Symptoms/Complaints:  DIZZY/MIGRAINE  Pain: Initial: NO PAIN     Post Session:  NO PAIN     Assessment/Reassessment only, no treatment provided today    Braces/Orthotics/Lines/Etc:   · O2 Device: Room air  Treatment/Session Assessment:    · Response to Treatment:  ASSESSMENT ONLY. · Interdisciplinary Collaboration:   o Physician/PA - SPOKE TO [address] [place] [address]. · After treatment position/precautions:   o Supine in bed   · Compliance with Program/Exercises: Will assess as treatment progresses  · Recommendations/Intent for next treatment session:  Total Treatment Duration:  PT Patient Time In/Time Out  Time In: 1126  Time Out: [address], PT            Problem: Mobility Impaired (Adult and Pediatric)  Goal: *Acute Goals and Plan of Care (Insert Text)  Description  NO ACUTE GOALS AT THIS TIME.   ACUTE PT IS NOT INDICATED AT THIS TIME   Outcome: Resolved/Met

## 2019-07-09 NOTE — INTERDISCIPLINARY ROUNDS
Interdisciplinary team rounds were held 7/9/2019 with the following team members:Care Management, Nurse Practitioner, Occupational Therapy and . Plan of care discussed. See clinical pathway and/or care plan for interventions and desired outcomes.

## 2019-07-09 NOTE — DISCHARGE SUMMARY
Hospitalist Discharge Summary     Admit Date:  2019  5:49 AM   Name:  Annabella Smith   Age:  59 y.o.  :  1960   MRN:  [id number]   PCP:  Shabbir Willis MD  Treatment Team: Attending Provider: Gomez Ch MD; Utilization Review: Oneil Granger RN    Problem List for this Hospitalization:  Hospital Problems as of 2019 Date Reviewed: 2017          Codes Class Noted - Resolved POA    * (Principal) Vertigo ICD-10-CM: R42  ICD-9-CM: 780.4  2019 - Present Yes        Type 2 diabetes mellitus with diabetic neuropathy, without long-term current use of insulin ([facility] ICD-10-CM: E11.40  ICD-9-CM: 250.60, 357.2  2016 - Present Yes        Hypertension ICD-10-CM: I10  ICD-9-CM: 401.9  Unknown - Present Yes                Admission HPI from 2019:    \" See H&P for details of admission \"    Hospital Course:    Patient is a 60 yo female with PMH  of vertigo x 15 years, T2DM, HTN, and diastolic dysfunction, who presented to the ER with  severe, abrupt dizziness and vomiting. MRI negative for CVA. Neurology consulted. BPPV and central cause unlikely. Feels symptoms consistent with peripheral cause such as Meniere's. Case management arranged new PCP and vestibular rehab at discharge. Patient to have BMP with PCP for hypokalemia and discuss diabetes management. Follow up instructions and discharge meds at bottom of this note. Plan was discussed with patient. All questions answered. Patient was stable at time of discharge. Diagnostic Imaging/Tests:   Mri Brain Wo Cont    Result Date: 2019  MRI OF THE BRAIN WITHOUT CONTRAST HISTORY: Central origin vertigo COMPARISON: None. TECHNIQUE: Multiplanar multiecho imaging was performed without intravenous contrast on a 1.5 [facility] MRI scanner. FINDINGS: * BRAIN: -  No acute infarction, hematoma, or mass.  -  No hydrocephalus. -  No white matter abnormality. -  Unremarkable sella turcica and craniocervical junction. * PARANASAL SINUSES: Clear * MASTOIDS: Clear * CALVARIUM AND SCALP: No calvarial or scalp abnormality is seen. * OTHER FINDINGS: None. IMPRESSION: Unremarkable MRI brain without contrast. Date Of Dictation: 7/8/2019 11:11 AM       Echocardiogram results:  No results found for this visit on 07/08/19.       All Micro Results     None          Labs: Results:       BMP, Mg, Phos Recent Labs     07/09/19  0620 07/08/19  0557    140   K 3.8 3.0*    103   CO2 26 24   AGAP 8 13   BUN 10 15   CREA 0.74 0.83   CA 9.2 9.5   * 218*      CBC Recent Labs     07/08/19  0557   WBC 8.7   RBC 5.11   HGB 11.9   HCT 38.4      GRANS 59   LYMPH 31   EOS 1   MONOS 7   BASOS 1   IG 1   ANEU 5.1   ABL 2.7   KIM 0.1   ABM 0.6   ABB 0.1   AIG 0.1      LFT Recent Labs     07/08/19  0557   SGOT 21   ALT 33      TP 8.0   ALB 4.3   GLOB 3.7*   AGRAT 1.2      Cardiac Testing Lab Results   Component Value Date/Time    BNP 94 03/13/2012 11:58 AM    CK 87 04/20/2013 01:00 AM    CK 31 05/15/2010 07:50 PM    CK 49 10/27/2008 11:20 AM    CK - MB 0.7 04/20/2013 01:00 AM    CK - MB <0.5 05/15/2010 07:50 PM    CK - MB 1.4 10/27/2008 11:20 AM    CK-MB Index 0.8 04/20/2013 01:00 AM    CK-MB Index CANNOT BE CALCULATED 05/15/2010 07:50 PM    CK-MB Index 2.9 (H) 10/27/2008 11:20 AM    Troponin-I <0.05 03/13/2012 11:58 AM    Troponin-I, Qt. <0.02 (L) 04/20/2013 01:00 AM    Troponin-I, Qt. <0.04 (L) 03/14/2012 12:50 AM    Troponin-I, Qt. <0.04 (L) 03/13/2012 07:00 PM      Coagulation Tests Lab Results   Component Value Date/Time    Prothrombin time 10.2 04/20/2013 01:00 AM    Prothrombin time 10.4 02/04/2010 07:20 AM    INR 1.0 04/20/2013 01:00 AM    INR 1.0 02/04/2010 07:20 AM    aPTT 28.0 04/20/2013 01:00 AM      A1c Lab Results   Component Value Date/Time    Hemoglobin A1c 8.9 (H) 07/08/2019 05:57 AM    Hemoglobin A1c 8.0 (H) 07/11/2018 02:35 PM    Hemoglobin A1c 8.0 (H) 04/02/2018 10:00 AM    Hemoglobin A1c, External 7.9 05/29/2015      Lipid Panel Lab Results   Component Value Date/Time    Cholesterol, total 253 (H) 08/31/2017 11:21 AM    HDL Cholesterol 41 08/31/2017 11:21 AM    LDL,Direct 120 (H) 03/14/2012 06:46 AM    LDL, calculated Comment 08/31/2017 11:21 AM    VLDL, calculated Comment 08/31/2017 11:21 AM    Triglyceride 424 (H) 08/31/2017 11:21 AM    CHOL/HDL Ratio 5.9 03/14/2012 06:46 AM      Thyroid Panel Lab Results   Component Value Date/Time    TSH 1.590 07/18/2017 03:20 PM    TSH 1.880 08/19/2016 12:13 PM        Most Recent UA Lab Results   Component Value Date/Time    Color Yellow 04/02/2018 10:00 AM    Appearance Clear 04/02/2018 10:00 AM    pH (UA) 6.0 04/02/2018 10:00 AM    Protein Trace (A) 04/02/2018 10:00 AM    Glucose >=1000 mg/dL (A) 04/02/2018 10:00 AM    Ketone Trace (A) 04/02/2018 10:00 AM    Bilirubin Negative 04/02/2018 10:00 AM    Blood Negative 04/02/2018 10:00 AM    Urobilinogen 0.2 E.U./dL 04/02/2018 10:00 AM    Nitrites Negative 04/02/2018 10:00 AM    Leukocyte Esterase Negative 04/02/2018 10:00 AM        Allergies   Allergen Reactions    Codeine Itching    Meperidine Other (comments)     Hallucinations     Immunization History   Administered Date(s) Administered    (RETIRED) Pneumococcal Vaccine (Unspecified Type) 10/28/2008    Influenza Vaccine 10/03/2018    Influenza Vaccine Split 10/28/2008    Pneumococcal Polysaccharide (PPSV-23) 07/11/2018    Tdap 07/11/2018    Tuberculin 10/25/2008       All Labs from Last 24 Hrs:  Recent Results (from the past 24 hour(s))   GLUCOSE, POC    Collection Time: 07/08/19  5:23 PM   Result Value Ref Range    Glucose (POC) 226 (H) 65 - 100 mg/dL   GLUCOSE, POC    Collection Time: 07/08/19  9:49 PM   Result Value Ref Range    Glucose (POC) 233 (H) 65 - 029 mg/dL   METABOLIC PANEL, BASIC    Collection Time: 07/09/19  6:20 AM   Result Value Ref Range    Sodium 141 136 - 145 mmol/L    Potassium 3.8 3.5 - 5.1 mmol/L    Chloride 107 98 - 107 mmol/L CO2 26 21 - 32 mmol/L    Anion gap 8 7 - 16 mmol/L    Glucose 149 (H) 65 - 100 mg/dL    BUN 10 8 - 23 MG/DL    Creatinine 0.74 0.6 - 1.0 MG/DL    GFR est AA >60 >60 ml/min/1.73m2    GFR est non-AA >60 >60 ml/min/1.73m2    Calcium 9.2 8.3 - 10.4 MG/DL   GLUCOSE, POC    Collection Time: 07/09/19  7:02 AM   Result Value Ref Range    Glucose (POC) 196 (H) 65 - 100 mg/dL   GLUCOSE, POC    Collection Time: 07/09/19 11:32 AM   Result Value Ref Range    Glucose (POC) 271 (H) 65 - 100 mg/dL   GLUCOSE, POC    Collection Time: 07/09/19  4:12 PM   Result Value Ref Range    Glucose (POC) 283 (H) 65 - 100 mg/dL       Discharge Exam:  Patient Vitals for the past 24 hrs:   Temp Pulse Resp BP SpO2   07/09/19 1606 97.6 °F (36.4 °C) 100 18 113/63 93 %   07/09/19 1226 98.1 °F (36.7 °C) 79 17 122/76 92 %   07/09/19 0806 98.3 °F (36.8 °C) 75 18 131/73 91 %   07/09/19 0447 97.8 °F (36.6 °C) 83 17 118/75 91 %   07/08/19 2348 97.8 °F (36.6 °C) 80 17 145/72 94 %   07/08/19 2036 98.3 °F (36.8 °C) 82 17 122/79 93 %     Oxygen Therapy  O2 Sat (%): 93 % (07/09/19 1606)  Pulse via Oximetry: 97 beats per minute (07/08/19 1150)  O2 Device: Room air (07/08/19 0505)  No intake or output data in the 24 hours ending 07/09/19 1703    General:    Well nourished. Alert. No distress. Eyes:   Normal sclera. Extraocular movements intact. ENT:  Normocephalic, atraumatic. Moist mucous membranes  CV:   Regular rate and rhythm. No murmur, rub, or gallop. Lungs:  Clear to auscultation bilaterally. No wheezing, rhonchi, or rales. Abdomen: Soft, nontender, nondistended. Bowel sounds normal.   Extremities: Warm and dry. No cyanosis or edema. Neurologic: CN II-XII grossly intact. Sensation intact. Skin:     No rashes or jaundice. Psych:  Normal mood and affect.     Discharge Info:   Current Discharge Medication List      CONTINUE these medications which have CHANGED    Details   ondansetron (ZOFRAN ODT) 4 mg disintegrating tablet Take 1 Tab by mouth every eight (8) hours as needed for Nausea for up to 5 days. Qty: 20 Tab, Refills: 1         CONTINUE these medications which have NOT CHANGED    Details   amLODIPine (NORVASC) 5 mg tablet Take 1 Tab by mouth daily. Qty: 90 Tab, Refills: 1    Associated Diagnoses: Essential hypertension      atorvastatin (LIPITOR) 20 mg tablet Take 1 Tab by mouth daily. Qty: 90 Tab, Refills: 1    Associated Diagnoses: Dyslipidemia      losartan-hydroCHLOROthiazide (HYZAAR) 100-25 mg per tablet Take 1 Tab by mouth daily. Qty: 90 Tab, Refills: 1    Associated Diagnoses: Essential hypertension      SITagliptin (JANUVIA) 100 mg tablet Take 1 Tab by mouth daily. Qty: 90 Tab, Refills: 1    Associated Diagnoses: Type 2 diabetes mellitus with diabetic neuropathy, without long-term current use of insulin ([facility])      dapagliflozin (FARXIGA) 10 mg tab tablet Take 1 Tab by mouth daily. Qty: 80 Tab, Refills: 1    Associated Diagnoses: Type 2 diabetes mellitus with diabetic neuropathy, without long-term current use of insulin ([facility])      fluticasone-salmeterol (ADVAIR DISKUS) 250-50 mcg/dose diskus inhaler Take 1 Puff by inhalation every twelve (12) hours. Qty: 1 Inhaler, Refills: 12    Associated Diagnoses: Mild intermittent asthma without complication      albuterol (VENTOLIN HFA) 90 mcg/actuation inhaler Take 2 Puffs by inhalation every four (4) hours as needed for Wheezing. Qty: 1 Inhaler, Refills: 11    Associated Diagnoses: Mild intermittent asthma without complication      omega-3 acid ethyl esters (LOVAZA) 1 gram capsule Take 2 Caps by mouth two (2) times a day. Qty: 120 Cap, Refills: 5    Associated Diagnoses: Dyslipidemia               Disposition: home    Activity: as specified in dc instructions .  No driving  Diet: DIET REGULAR    Follow-up Appointments   Procedures    FOLLOW UP VISIT Appointment in: 3 - 5 Days     Standing Status:   Standing     Number of Occurrences:   1     Order Specific Question:   Appointment in Answer:   3 - 5 Days         Follow-up Information     Follow up With Specialties Details Why Contact Info    Carlos Garrido MD [facility] In 3 days  [address]  [phone]      outpatient vestibular rehab              Time spent in patient discharge planning and coordination 35 minutes.   Discharge plan discussed with Dr. Franco Sidhu,   Signed:  AUGUSTIN You

## 2019-07-09 NOTE — PROGRESS NOTES
Problem: Falls - Risk of  Goal: *Absence of Falls  Description  Document Penny Romana Fall Risk and appropriate interventions in the flowsheet. Outcome: Progressing Towards Goal     Problem: Patient Education: Go to Patient Education Activity  Goal: Patient/Family Education  Outcome: Progressing Towards Goal     Problem: Diabetes Self-Management  Goal: *Disease process and treatment process  Description  Define diabetes and identify own type of diabetes; list 3 options for treating diabetes. Outcome: Progressing Towards Goal  Goal: *Incorporating nutritional management into lifestyle  Description  Describe effect of type, amount and timing of food on blood glucose; list 3 methods for planning meals. Outcome: Progressing Towards Goal  Goal: *Incorporating physical activity into lifestyle  Description  State effect of exercise on blood glucose levels. Outcome: Progressing Towards Goal  Goal: *Developing strategies to promote health/change behavior  Description  Define the ABC's of diabetes; identify appropriate screenings, schedule and personal plan for screenings. Outcome: Progressing Towards Goal  Goal: *Using medications safely  Description  State effect of diabetes medications on diabetes; name diabetes medication taking, action and side effects. Outcome: Progressing Towards Goal  Goal: *Monitoring blood glucose, interpreting and using results  Description  Identify recommended blood glucose targets  and personal targets. Outcome: Progressing Towards Goal  Goal: *Prevention, detection, treatment of acute complications  Description  List symptoms of hyper- and hypoglycemia; describe how to treat low blood sugar and actions for lowering  high blood glucose level.   Outcome: Progressing Towards Goal  Goal: *Prevention, detection and treatment of chronic complications  Description  Define the natural course of diabetes and describe the relationship of blood glucose levels to long term complications of diabetes.   Outcome: Progressing Towards Goal  Goal: *Developing strategies to address psychosocial issues  Description  Describe feelings about living with diabetes; identify support needed and support network  Outcome: Progressing Towards Goal  Goal: *Insulin pump training  Outcome: Progressing Towards Goal  Goal: *Sick day guidelines  Outcome: Progressing Towards Goal  Goal: *Patient Specific Goal (EDIT GOAL, INSERT TEXT)  Outcome: Progressing Towards Goal

## 2019-07-09 NOTE — DISCHARGE INSTRUCTIONS
Do not drive, operate machinery, or return to work until symptoms resolve. Follow up with PCP and ENT and discuss the events of this admission. Patient Education        Ménière's Disease: Care Instructions  Your Care Instructions    Ménière's (say \"men-YEERS\") disease is a problem of the inner ear that affects hearing and balance. It causes sudden attacks of vertigo that make you feel like you are spinning. It can also cause a loud ringing in the ears called tinnitus, a temporary loss of hearing, and a feeling of fullness in the ear. Your hearing loss may not get better. The cause of Ménière's disease is not known, but it may be related to a fluid imbalance in the inner ear. The goal of treatment is to make the vertigo less severe until the attack ends. Some people can prevent attacks by eating a diet low in sodium and by doing exercises to improve balance. Medicines may also help. Surgery is an option for some people. Follow-up care is a key part of your treatment and safety. Be sure to make and go to all appointments, and call your doctor if you are having problems. It's also a good idea to know your test results and keep a list of the medicines you take. How can you care for yourself at home? · During an attack of vertigo, lie down and hold your head very still until the feeling passes. This may help you cope with vertigo. · Take your medicines exactly as prescribed. Call your doctor if you think you are having a problem with your medicine. You will get more details on the specific medicines your doctor prescribes. · Avoid caffeine, alcohol, tobacco, and stress, along with any other substances or conditions that trigger an attack. · Eat a diet low in sodium to reduce fluid buildup in the inner ear. · Do exercises to improve your balance. These can help ease vertigo. ? Stand with your feet together, arms at your sides, and hold this position for 30 seconds.   ? For slightly harder exercise, stand with your feet together and arms at your sides while slowly moving your head up and down and side to side. ? Keep a chart to track your progress. It can make you aware of any improvements. Include the date, the time you spent exercising, how often your eyes were open or closed, and how you felt during each exercise. ? Walk 5 steps and then stop abruptly. Wait for any dizzy feeling to go away, and do it again. Repeat until you have walked about 50 feet. Walking exercises for vertigo may improve your balance and your symptoms of vertigo. You may want to have someone next to you while you do these exercises in case you lose your balance. ? For another walking exercise, walk 5 steps, turn, and walk back. Wait for any dizziness to go away. Repeat 5 more times. ? For harder exercise, walk and turn your head to the right or left with every other step. Try to walk about 50 feet. Repeat the exercise while moving your head up and down. Then repeat while moving your head up to one side and down to the other side, and then switching. ? Prepare a list that shows the distance you walked, how often you walked, and how you felt while you were walking. · Make sure your home is safe for those times when you have an attack of vertigo. ? Get rid of throw rugs, and use nonskid mats. ? Use grab bars near the bathtub and toilet. ? Use night-lights. ? Keep floors dry to prevent slipping. ? Store items you use often on low shelves so you do not have to climb or reach high. If you have to climb, use a step stool with handrails. ? Keep driveways, sidewalks, and other walkways clear of things that might cause you to trip. · There are other steps you can take to stay safe. ? Avoid driving or working at heights. ? Wear shoes with low heels and nonslip soles. ? Keep your shoes tied. ? Alert family and friends to your condition. ? Know whether medicines you take can affect your sense of balance.   When should you call for help?  Call 911 anytime you think you may need emergency care. For example, call if:    · You passed out (lost consciousness).     · You have symptoms of a stroke. These may include:  ? Sudden numbness, tingling, weakness, or loss of movement in your face, arm, or leg, especially on only one side of your body. ? Sudden vision changes. ? Sudden trouble speaking. ? Sudden confusion or trouble understanding simple statements. ? Sudden problems with walking or balance. ? A sudden, severe headache that is different from past headaches.    Call your doctor now or seek immediate medical care if:    · You have new or worse dizziness.     · You notice changes in your hearing.    Watch closely for changes in your health, and be sure to contact your doctor if:    · You have new or worse nausea or vomiting.     · Your vertigo gets worse.     · You do not get better as expected. Where can you learn more? Go to http://lyndon-mary ellen.info/. Enter Q503 in the search box to learn more about \"Ménière's Disease: Care Instructions. \"  Current as of: March 27, 2018  Content Version: 11.9  © 4159-8386 [employer]. Care instructions adapted under license by [employer] (which disclaims liability or warranty for this information). If you have questions about a medical condition or this instruction, always ask your healthcare professional. [address] any warranty or liability for your use of this information.

## 2019-07-10 ENCOUNTER — PATIENT OUTREACH (OUTPATIENT)
Dept: OTHER | Age: 59
End: 2019-07-10

## 2019-07-10 NOTE — PROGRESS NOTES
Transition Of Care Note    Patient discharged from [facility] admitted on  and discharged on  for Vertigo. Medical History:     Past Medical History:   Diagnosis Date    Anxiety     no meds at present    Asthma     prn inhaler    Depression     no meds at present    Diabetes mellitus type 2, controlled, without complications ([facility] [address]     type 2-- sqbs avg am- ---  no hypo    Dyslipidemia 2010    Hypertension     controlled with med    Migraines     Right knee pain     SVT (supraventricular tachycardia) ([facility])     pt states not recent problem-- none x 2 yrs-- no meds-- per pt-- cough would stop it    Vertigo     Vitamin D deficiency     resolved--- not a recent issue       Care Manager contacted the patient by telephone to perform post hospital discharge assessment. Verified  and zip code with patient as identifiers. Provided introduction to self, and explanation of the Nurse Care Manager role. Patient's primary care provider relationship reviewed with patient and modified, as applicable. Red Flags:   You passed out (lost consciousness).  You have symptoms of a stroke. These may include:  ? Sudden numbness, tingling, weakness, or loss of movement in your face, arm, or leg,  especially on only one side of your body. ? Sudden vision changes. ? Sudden trouble speaking. ? Sudden confusion or trouble understanding simple statements. ? Sudden problems with walking or balance. ? A sudden, severe headache that is different from past headaches.  You have new or worse dizziness.  You notice changes in your hearing.  You have new or worse nausea or vomiting.  Your vertigo gets worse.  You do not get better as expected. Condition Focused Assessment:   Patient reports the following:   Neurological Condition Focused Assessment    Description of pain: dull pain. Associated symptoms: some blurriness   Have you recently had any of the following?   Any recent changes in mood, thinking or new symptoms no  Any change in speech no  Difficulty swallowing no  Dizziness/lightheadedness yes, and hears hissing sounds with tinnitus. Fatigue no    Anxiety no  Confusion no   Sleep disturbance no     Visual changes yes  Limb paralysis, or facial drooping no  Weakness no  Nausea yes using Sea Bands for nausea   Trouble with coordinating your movement, or change in gait no  Medication changes? zofran 4mg every 8hrs as needed  Any recent changes in activity no  Does patient have incentive spirometer? no  If yes, how frequently is patient using incentive spirometer? n/a  Mobility or assistive device in place no  DME needs addressed yes  PT/OT/ST ordered no, pt to follow-up with ENT for possible vestibular rehab          Medication:   New Medications at Discharge: none noted  Changed Medications at Discharge: zofran changed to 4mg every 8hrs as needed  Discontinued Medications at Discharge: none noted  Current Outpatient Medications   Medication Sig    ondansetron (ZOFRAN ODT) 4 mg disintegrating tablet Take 1 Tab by mouth every eight (8) hours as needed for Nausea for up to 5 days.  amLODIPine (NORVASC) 5 mg tablet Take 1 Tab by mouth daily.  atorvastatin (LIPITOR) 20 mg tablet Take 1 Tab by mouth daily.  losartan-hydroCHLOROthiazide (HYZAAR) 100-25 mg per tablet Take 1 Tab by mouth daily.  potassium chloride SA (MICRO-K) 10 mEq capsule Take 1 Cap by mouth two (2) times a day.  SITagliptin (JANUVIA) 100 mg tablet Take 1 Tab by mouth daily.  dapagliflozin (FARXIGA) 10 mg tab tablet Take 1 Tab by mouth daily.  fluticasone-salmeterol (ADVAIR DISKUS) 250-50 mcg/dose diskus inhaler Take 1 Puff by inhalation every twelve (12) hours. (Patient taking differently: Take 1 Puff by inhalation two (2) times daily as needed.)    albuterol (VENTOLIN HFA) 90 mcg/actuation inhaler Take 2 Puffs by inhalation every four (4) hours as needed for Wheezing.     omega-3 acid ethyl esters (LOVAZA) 1 gram capsule Take 2 Caps by mouth two (2) times a day.  meclizine (ANTIVERT) 25 mg tablet Take 1 Tab by mouth three (3) times daily as needed. No current facility-administered medications for this visit. There are no discontinued medications. Performed medication reconciliation with patient, and patient verbalizes understanding of administration of home medications. There were no barriers to obtaining medications identified at this time. Inpatient RRAT score: 16  Was this a readmission? no   Patient stated reason for the readmission: n/a    Barriers/Support system:  patient and son    Home health/DME in place per discharge orders    Barriers/Challenges to Care: []  Decline in memory    []  Language barrier     []  Emotional                  []  Limited mobility  []  Lack of motivation     [x] Vision, hearing or cognitive impairment []  Knowledge [] Financial Barriers []  Lack of support  []  Pain []  Other []  None    CM Identified  Problems   (contributing problems for risk for readmission)  1. Risk for falls- pt denied issues with gait or coordination, dangling legs when rising from lying position   2. Hyperglycemia- patient not checking blood glucose at home, high 200's noted during stay and recent A1C of 8.9 which has increased  3. Depleted potassium from N/V related to vertigo, has improved      Discharge Instructions :  Reviewed discharge instructions with patient. Patient verbalizes understanding of discharge instructions and follow-up care.      Advance Care Planning:   Patient was offered the opportunity to discuss advance care planning:  no     Does patient have an Advance Directive:  no   If no, did you provide information on Caring Connections?  no     PCP/Specialist follow up:    Future Appointments   Date Time Provider Carlo Sullivan   7/12/2019  1:30 PM JAS Marques PFP P[facility]   7/16/2019  9:00 AM JAS Mitchell [facility] [place] [facility]      Reviewed red flags with patient, and patient verbalizes understanding. Patient given an opportunity to ask questions. No other clinical/social/functional needs noted. The patient agrees to contact the PCP office for questions related to their healthcare. The patient expressed thanks, offered no additional questions and ended the call. Follow-up on new PCP appointment and ENT.  Education on BG monitoring and nutrition

## 2019-07-22 ENCOUNTER — HOSPITAL ENCOUNTER (OUTPATIENT)
Dept: PHYSICAL THERAPY | Age: 59
Discharge: HOME | End: 2019-07-22
Payer: COMMERCIAL

## 2019-07-22 PROCEDURE — 97162 PT EVAL MOD COMPLEX 30 MIN: CPT

## 2019-07-22 NOTE — THERAPY EVALUATION
Les Mercado  :   Primary: [address]  Secondary:  [address]  at [facility]  [address], [place], [facility] [address] [facility]  [phone]   [phone]         OUTPATIENT PHYSICAL THERAPY:Initial Assessment 2019    ICD-10: Treatment Diagnosis: Dizziness and giddiness (R42)  Unsteadiness on feet (R26.81)  Precautions/Allergies:   Codeine and Meperidine   TREATMENT PLAN:  Effective Dates: 2019 TO 10/20/2019 (90 days). Frequency/Duration: 2 times a week for 90 Days   MEDICAL/REFERRING DIAGNOSIS:  meniere's disease   DATE OF ONSET: 2 weeks ago  REFERRING PHYSICIAN: Joey Brown NP  RETURN PHYSICIAN APPOINTMENT: none scheduled  MD Orders: evaluate and treat, balance/vestibular rehab     ASSESSMENT (Date: 19):  Ms. Lyn Hall presents to physical therapy with compliants of intermittent vertigo with migraines for the last 15 years. Reports getting them about 6 times a year along with auras and decreased vision. She may have vertiginous migraines and would benefit from an assessment for this. She is suppose to be making an appointment with the ENT. She did have difficulty with oculomotor testing today and reporting feeling nauseous but symptom etiology was unclear. She also has decreased LE sensation affecting her balance. She would benefit from skilled PT to decrease her symptoms of vertigo and decrease her risk of falls. She would benefit from an ENT visit for further diagnosis of her vestibular deficits and she was encouraged to reschedule the appointment she cancelled. PROBLEM LIST (Impacting functional limitations):  · Decreased Strength  · Decreased ADL/Functional Activities  · Decreased Transfer Abilities  · Decreased Ambulation Ability/Technique  · Decreased Balance  · Decreased [place] with Home Exercise Program INTERVENTIONS PLANNED:  1. Balance Exercise  2.  Home Exercise Program (HEP)  3. Neuromuscular Re-education/Strengthening  4. Range of Motion (ROM)  5. Therapeutic Activites  6. Therapeutic Exercise/Strengthening  7. vestibular exercises     GOALS: (Goals have been discussed and agreed upon with patient.)  Discharge Goals: Time Frame: 90 days  1. Patient will be independent with HEP. 2. Patient will have an increase on the salinas balance to 50/56 in order to decrease her risk of falls. 3. Patient will have a decrease on the [facility] [address] to 20/100 indicating a reduction in symptoms. 4. Patient will demonstrate smooth pursuit x 10 reps horizontally in order to perform ADLs and IADLs without dizziness. Outcome Measure: Tool Used: [place] Balance Scale  Score:  Initial: 44/56 Most Recent: X/56 (Date: -- )   Interpretation of Score: Each section is scored on a 0-4 scale, 0 representing the patients inability to perform the task and 4 representing independence. The scores of each section are added together for a total score of 56. The higher the patients score, the more independent the patient is. Any score below 45 indicates increased risk for falls. Tool Used: Dizziness Handicap Index  Score:  Initial: 70  Most Recent:  (Date: -- )   Interpretation of Score: To each item, the following scores may be assigned: No = 0, Sometimes = 2, Yes = 4. Scores greater than 10 points should be referred to balance specialists for further evaluation. Ambulatory/Rehab Services H2 Model Falls Risk Assessment    Risk Factors:       (1)  Dizziness/Vertigo       (1)  Visual Impairment [specify:  decreased peripheral vision]       (5)  History of Recent Falls [w/in 3 months] Ability to Rise from Chair:       (0)  Ability to rise in a single movement    Falls Prevention Plan:       Exercise/Equipment Adaptation (specify):  seated HEP or with hand hold   Total: (5 or greater = High Risk): 7    ©2010 [employer]. All Rights Reserved. [facility] Patent #[id number].  Federal Law prohibits the replication, distribution or use without written permission from [facility] of [address] Necessity:   · Patient is expected to demonstrate progress in balance and functional technique to decrease vertigo wtih ADLs and IADLs. · Patient demonstrates fair rehab potential due to higher previous functional level. Reason for Services/Other Comments:  · Patient continues to require modification of therapeutic interventions to increase complexity of exercises. Rehabilitation Potential For Stated Goals: [address] therapy, I certify that the treatment plan above will be carried out by a therapist or under their direction. Thank you for this referral,  Cinda Adame DPT              HISTORY:   Patient Stated Goal:        Get rid of vertigo  History of Present Injury/Illness (Reason for Referral):  Patient reports 2 weeks ago she had vertigo at work. She reports she has gotten this intermittently over the last 15 years. She gets vertigo and then migraines or migraines and then vertigo. She was in the hospital for 1 day. Neuro said she was fine. Is suppose to go to ENT but hasn't had that appointment yet. Reports ringing in both ears for 15+ years. Reports getting it 6 times a year. She isn't taking anything for migraines. Reports she feels like she is looking out of a pin hole and then loses her vision completely. Reports she falls to the floor a lot because of her vertigo. Past Medical History/Comorbidities:   Ms. Mayela Moyer  has a past medical history of Anxiety, Asthma, Depression, Diabetes mellitus type 2, controlled, without complications ([address] Dyslipidemia (2010), Hypertension, Migraines, Right knee pain, SVT (supraventricular tachycardia) ([address] Vertigo, and Vitamin D deficiency.   Ms. Mayela Moyer  has a past surgical history that includes hx tonsillectomy (as child); hx lap cholecystectomy (); hx  section (); hx tubal ligation (Bilateral); and hx kash and bruce (2001). Per medical history questionnaire/therapist interview:   Social History/Living Environment:     lives with son. 1 story home. Drives. Prior Level of Function/Work/Activity:  Works in reception     Current Medications:    Current Outpatient Medications:     dulaglutide (TRULICITY) 1.5 QN/2.6 mL sub-q pen, 0.5 mL by SubCUTAneous route every seven (7) days. Indications: type 2 diabetes mellitus, Disp: 2 Pen, Rfl: 5    amLODIPine (NORVASC) 5 mg tablet, Take 1 Tab by mouth daily. , Disp: 90 Tab, Rfl: 1    atorvastatin (LIPITOR) 20 mg tablet, Take 1 Tab by mouth daily. , Disp: 90 Tab, Rfl: 1    losartan-hydroCHLOROthiazide (HYZAAR) 100-25 mg per tablet, Take 1 Tab by mouth daily. , Disp: 90 Tab, Rfl: 1    potassium chloride SA (MICRO-K) 10 mEq capsule, Take 1 Cap by mouth two (2) times a day., Disp: 180 Cap, Rfl: 1    dapagliflozin (FARXIGA) 10 mg tab tablet, Take 1 Tab by mouth daily. , Disp: 90 Tab, Rfl: 1    fluticasone-salmeterol (ADVAIR DISKUS) 250-50 mcg/dose diskus inhaler, Take 1 Puff by inhalation every twelve (12) hours. (Patient taking differently: Take 1 Puff by inhalation two (2) times daily as needed.), Disp: 1 Inhaler, Rfl: 12    albuterol (VENTOLIN HFA) 90 mcg/actuation inhaler, Take 2 Puffs by inhalation every four (4) hours as needed for Wheezing., Disp: 1 Inhaler, Rfl: 11    omega-3 acid ethyl esters (LOVAZA) 1 gram capsule, Take 2 Caps by mouth two (2) times a day., Disp: 120 Cap, Rfl: 5    meclizine (ANTIVERT) 25 mg tablet, Take 1 Tab by mouth three (3) times daily as needed. , Disp: 30 Tab, Rfl: 2     Date Last Reviewed:  7/22/2019     Number of Personal Factors/Comorbidities that affect the Plan of Care: 1-2: MODERATE COMPLEXITY   EXAMINATION:   Observation/Orthostatic Postural Assessment:          Rounded shoulders, forward head  Mental Status:          Alert and oriented  Sensation:         Light Tough: diminished LLE and diminished RLE , Proprioception: impaired LLE and impaired RLE  Vision:          impaired, glasses and blurred  Coordination:          grossly intact    Lower Extremity:  Grossly 4/5 B   Strength PROM   Action R L R  L    Hip Flexion       Hip Extension       Hip Abduction       Hip Adduction       Knee Flexion       Knee Extension       Dorsi Flexion       Plantar Flexion       Inversion       Eversion            Postural Control & Balance:  · Sheikh Balance Scale:  44/56.   (A score less than 45/56 indicates high risk of falls)     · Dynamic Gait Index:  NT/24.   (A score less than or equal to19/24 is abnormal and predictive of falls)     · Impaired romberg with eyes closed  · Impaired foam balance with eyes open and eyes closed    Oculomotor Exam:  · Eye Range of Motion:  full range of motion, reports nausea   · Cervical Range of Motion:   diminished range of motion  · Spontaneous nystagmus:  NO  · Gaze holding nystagmus:  NO  · Smooth Pursuit:      []Smooth Eye Movements    [x]Delayed Eye Movements     []Within Normal Limits     [x]Other(comment): reports nausea  · Voluntary Saccades:      []Smooth Eye Movements    [x]Delayed Eye Movements     []Within Normal Limits     [x]Other(comment): reports nausea   Vestibular Ocular Reflex Testing:  · Head Thrust Test: Not Tested to the right. Not Testedto the left. · VOR Cancellation: difficulty to assess due to reports of nausea with head movements  Position Tests:  · Hallpike-Sumner testing not indicated today  · Imbalance and dizziness with standing trunk flexion/extension    Functional Mobility:         Gait/Ambulation:  Ambulates with a slight decrease in gait speed, appears mostly steady        Transfers:  Push to stand, slow        Bed Mobility:  NT        Stairs:  NT        Wheelchair:  NT              Body Structures Involved:  1. Nerves  2. Eyes and Ears  3. Muscles Body Functions Affected:  1. Sensory/Pain  2. Neuromusculoskeletal  3.  Movement Related Activities and Participation Affected:  1. Mobility  2. Self Care  3. Domestic Life  4. Interpersonal Interactions and Relationships  5.  Community, Social and [place]   Number of elements that affect the Plan of Care: 4+: HIGH COMPLEXITY   CLINICAL PRESENTATION:   Presentation: Evolving clinical presentation with changing clinical characteristics: MODERATE COMPLEXITY   CLINICAL DECISION MAKING:      Use of outcome tool(s) and clinical judgement create a POC that gives a: Questionable prediction of patient's progress: MODERATE COMPLEXITY        Total Treatment Duration:  PT Patient Time In/Time Out  Time In: 0800  Time Out: 0840    Nadege Bales DPT

## 2019-07-26 ENCOUNTER — HOSPITAL ENCOUNTER (OUTPATIENT)
Dept: PHYSICAL THERAPY | Age: 59
Discharge: HOME | End: 2019-07-26
Payer: COMMERCIAL

## 2019-07-29 ENCOUNTER — HOSPITAL ENCOUNTER (OUTPATIENT)
Dept: PHYSICAL THERAPY | Age: 59
Discharge: HOME | End: 2019-07-29
Payer: COMMERCIAL

## 2019-07-29 PROCEDURE — 97110 THERAPEUTIC EXERCISES: CPT

## 2019-07-29 NOTE — PROGRESS NOTES
Talita Cruz  :   Primary: [address]  Secondary:  [address]  at [facility]  [address], [place], [facility] [address] [facility]  [phone]   [phone]      OUTPATIENT PHYSICAL THERAPY: Daily Treatment Note 2019    Visit Count:  2      ICD-10: Treatment Diagnosis: Dizziness and giddiness (R42)  Unsteadiness on feet (R26.81)  Precautions/Allergies:   Codeine and Meperidine   TREATMENT PLAN:  Effective Dates: 2019 TO 10/20/2019 (90 days). Frequency/Duration: 2 times a week for 90 Days    Pre-treatment Symptoms/Complaints:  Patient reports a bout of vertigo last Tuesday and reports she has been nauseous since then. Reports feeling unsteady right now and feels like she is going sideways when she walks. Pain: Initial:   0/10 Post Session:  0/10   Medications Last Reviewed:  2019  Updated Objective Findings:  None Today   TREATMENT:     THERAPEUTIC EXERCISE: (40 minutes):  Exercises per grid below to improve mobility, balance and dynamic movement of head and eyes to improve functional bending, turning, position changes, and gait. Required moderate visual, verbal and tactile cues to promote proper body alignment, promote proper body posture and promote proper body mechanics. Progressed complexity of movement as indicated. Date:  19 Date:   Date:     Activity/Exercise Parameters Parameters Parameters   Smooth pursuit 3 x 4 reps horizontal- couldn't tolerate more  10 x 3 reps vertical  10 x 2 reps each diagonal      saccades 4 x 4 reps horizontal  10 x 3 reps vertical  8 x 2 reps each diagonal       VOR 8 x 2 reps each direction- poor ability                                 Qubell Portal  Treatment/Session Summary:    · Response to Treatment: patient had a lot of difficulty with eye exercises. She was given a detailed HEP to continue working on at home.   She continues to benefit from skilled PT to decrease symptoms of vertigo. · No adverse reactions/unusual changes observed/reported in clinical status this session. · Communication/Consultation:  None today  · Equipment provided today:  HEP and popsicle sticks  · Recommendations/Intent for next treatment session: Next visit will focus on decreasing vertigo.   Total Treatment Billable Duration:  40 minutes  PT Patient Time In/Time Out  Time In: 0800  Time Out: [address] DPT    Future Appointments   Date Time Provider Carlo Sullivan   7/31/2019  8:00 AM Calin Coreas DPT [facility]

## 2019-07-31 ENCOUNTER — PATIENT OUTREACH (OUTPATIENT)
Dept: OTHER | Age: 59
End: 2019-07-31

## 2019-07-31 NOTE — PROGRESS NOTES
Telephonic outreach to follow up with patient and verify attendance of follow up appointments. Left a discreet VM with a request for a return call. Will attempt to outreach patient in approx 2 weeks.

## 2019-08-01 ENCOUNTER — PATIENT OUTREACH (OUTPATIENT)
Dept: OTHER | Age: 59
End: 2019-08-01

## 2019-08-01 NOTE — PROGRESS NOTES
Patient returned this CM's call and verified identifiers. Patient reports that she is feeling much better and has followed up with her post hospital visits. Patient continues physical therapy. Will reach out to the patient in 2 weeks to offer additional care management and assess progress.

## 2019-08-08 ENCOUNTER — PATIENT OUTREACH (OUTPATIENT)
Dept: OTHER | Age: 59
End: 2019-08-08

## 2019-08-08 NOTE — PROGRESS NOTES
Resolving current episode EJ (Transitions of care complete). No further ED/UC or hospital admissions within 30 days post discharge. Patient attended follow-up appointments as directed. No outreach from patient to [address] [place] [address].

## 2019-11-13 NOTE — THERAPY DISCHARGE
Danielle Milan  :   Primary: [address]  Secondary:  [address]  at [facility]  [address], [address], [place], [facility] [address] [facility]  [phone]   [phone]         OUTPATIENT PHYSICAL THERAPY:Discontinuation Summary 2019    ICD-10: Treatment Diagnosis: Dizziness and giddiness (R42)  Unsteadiness on feet (R26.81)  Precautions/Allergies:   Codeine and Meperidine   TREATMENT PLAN:  Effective Dates: 2019 TO 10/20/2019 (90 days). Frequency/Duration: 2 times a week for 90 Days   MEDICAL/REFERRING DIAGNOSIS:  meniere's disease   DATE OF ONSET: 2 weeks ago  REFERRING PHYSICIAN: Dina Arzola NP  RETURN PHYSICIAN APPOINTMENT: none scheduled  MD Orders: evaluate and treat, balance/vestibular rehab     ASSESSMENT (Date: ):  Danielle Milan has been seen in physical therapy from 19 to 19 for 2 visits. Treatment has been discontinued at this time due to patient not showing for her appointment. patient was called and a message was left but the call was never returned. The below goals were met prior to discontinuation. Some goals were not met due to patient not following up with scheduled therapy. Thank you for this referral.        PROBLEM LIST (Impacting functional limitations):  · Decreased Strength  · Decreased ADL/Functional Activities  · Decreased Transfer Abilities  · Decreased Ambulation Ability/Technique  · Decreased Balance  · Decreased [place] with Home Exercise Program INTERVENTIONS PLANNED:  1. Balance Exercise  2. Home Exercise Program (HEP)  3. Neuromuscular Re-education/Strengthening  4. Range of Motion (ROM)  5. Therapeutic Activites  6. Therapeutic Exercise/Strengthening  7. vestibular exercises     GOALS: (Goals have been discussed and agreed upon with patient.)  Discharge Goals: Time Frame: 90 days  1. Patient will be independent with HEP.  NOT MET  2.  Patient will have an increase on the salinas balance to 50/56 in order to decrease her risk of falls. NOT MET  3. Patient will have a decrease on the [address] to 20/100 indicating a reduction in symptoms. NOT MET  4. Patient will demonstrate smooth pursuit x 10 reps horizontally in order to perform ADLs and IADLs without dizziness. NOT MET    Outcome Measure: Tool Used: [place] Balance Scale  Score:  Initial: 44/56 Most Recent: X/56 (Date: -- )   Interpretation of Score: Each section is scored on a 0-4 scale, 0 representing the patients inability to perform the task and 4 representing independence. The scores of each section are added together for a total score of 56. The higher the patients score, the more independent the patient is. Any score below 45 indicates increased risk for falls. Tool Used: Dizziness Handicap Index  Score:  Initial: 70  Most Recent:  (Date: -- )   Interpretation of Score: To each item, the following scores may be assigned: No = 0, Sometimes = 2, Yes = 4. Scores greater than 10 points should be referred to balance specialists for further evaluation. Ambulatory/Rehab Services H2 Model Falls Risk Assessment    Risk Factors:       (1)  Dizziness/Vertigo       (1)  Visual Impairment [specify:  decreased peripheral vision]       (5)  History of Recent Falls [w/in 3 months] Ability to Rise from Chair:       (0)  Ability to rise in a single movement    Falls Prevention Plan:       Exercise/Equipment Adaptation (specify):  seated HEP or with hand hold   Total: (5 or greater = High Risk): 7    ©2010 [facility] [email or web address]. All Rights Reserved. [facility] States Patent #[id number],651. Federal Law prohibits the replication, distribution or use without written permission from [facility] For Stated Goals: [address] therapy, I certify that the treatment plan above will be carried out by a therapist or under their direction.   Thank you for this referral,  Cinda Adame, DPT

## 2020-03-09 ENCOUNTER — TELEPHONE (OUTPATIENT)
Dept: PHARMACY | Age: 60
End: 2020-03-09

## 2020-03-17 NOTE — TELEPHONE ENCOUNTER
Noted.    Pharmacy Pop Care Documentation:   Patient is missing the following requirements: DM Program Application; Lipid Panel; Urine Albumin.     Sharyle Hasten, [employer]   Department, toll free: [phone], option 7

## 2020-03-24 ENCOUNTER — HOSPITAL ENCOUNTER (EMERGENCY)
Age: 60
Discharge: HOME | End: 2020-03-24
Attending: EMERGENCY MEDICINE
Payer: COMMERCIAL

## 2020-03-24 ENCOUNTER — APPOINTMENT (OUTPATIENT)
Dept: GENERAL RADIOLOGY | Age: 60
End: 2020-03-24
Attending: EMERGENCY MEDICINE
Payer: COMMERCIAL

## 2020-03-24 VITALS
BODY MASS INDEX: 28.17 KG/M2 | OXYGEN SATURATION: 98 % | TEMPERATURE: 98.6 F | WEIGHT: 165 LBS | SYSTOLIC BLOOD PRESSURE: 157 MMHG | RESPIRATION RATE: 20 BRPM | HEIGHT: 64 IN | DIASTOLIC BLOOD PRESSURE: 68 MMHG | HEART RATE: 110 BPM

## 2020-03-24 DIAGNOSIS — J45.901 MODERATE ASTHMA WITH ACUTE EXACERBATION, UNSPECIFIED WHETHER PERSISTENT: Primary | ICD-10-CM

## 2020-03-24 LAB
ANION GAP SERPL CALC-SCNC: 12 MMOL/L (ref 7–16)
ATRIAL RATE: 129 BPM
BUN SERPL-MCNC: 13 MG/DL (ref 8–23)
CALCIUM SERPL-MCNC: 9.7 MG/DL (ref 8.3–10.4)
CALCULATED P AXIS, ECG09: 69 DEGREES
CALCULATED R AXIS, ECG10: 57 DEGREES
CALCULATED T AXIS, ECG11: 64 DEGREES
CHLORIDE SERPL-SCNC: 104 MMOL/L (ref 98–107)
CO2 SERPL-SCNC: 22 MMOL/L (ref 21–32)
CREAT SERPL-MCNC: 0.9 MG/DL (ref 0.6–1)
DIAGNOSIS, 93000: NORMAL
ERYTHROCYTE [DISTWIDTH] IN BLOOD BY AUTOMATED COUNT: 13.6 % (ref 11.9–14.6)
GLUCOSE SERPL-MCNC: 334 MG/DL (ref 65–100)
HCT VFR BLD AUTO: 40 % (ref 35.8–46.3)
HGB BLD-MCNC: 13.2 G/DL (ref 11.7–15.4)
MCH RBC QN AUTO: 28.4 PG (ref 26.1–32.9)
MCHC RBC AUTO-ENTMCNC: 33 G/DL (ref 31.4–35)
MCV RBC AUTO: 86 FL (ref 79.6–97.8)
NRBC # BLD: 0 K/UL (ref 0–0.2)
P-R INTERVAL, ECG05: 134 MS
PLATELET # BLD AUTO: 299 K/UL (ref 150–450)
PMV BLD AUTO: 10 FL (ref 9.4–12.3)
POTASSIUM SERPL-SCNC: 3.5 MMOL/L (ref 3.5–5.1)
Q-T INTERVAL, ECG07: 298 MS
QRS DURATION, ECG06: 68 MS
QTC CALCULATION (BEZET), ECG08: 436 MS
RBC # BLD AUTO: 4.65 M/UL (ref 4.05–5.2)
SODIUM SERPL-SCNC: 138 MMOL/L (ref 136–145)
VENTRICULAR RATE, ECG03: 129 BPM
WBC # BLD AUTO: 7.7 K/UL (ref 4.3–11.1)

## 2020-03-24 PROCEDURE — 71045 X-RAY EXAM CHEST 1 VIEW: CPT

## 2020-03-24 PROCEDURE — 99285 EMERGENCY DEPT VISIT HI MDM: CPT

## 2020-03-24 PROCEDURE — 74011250636 HC RX REV CODE- 250/636: Performed by: EMERGENCY MEDICINE

## 2020-03-24 PROCEDURE — 94644 CONT INHLJ TX 1ST HOUR: CPT

## 2020-03-24 PROCEDURE — 74011000250 HC RX REV CODE- 250: Performed by: EMERGENCY MEDICINE

## 2020-03-24 PROCEDURE — 80048 BASIC METABOLIC PNL TOTAL CA: CPT

## 2020-03-24 PROCEDURE — 96366 THER/PROPH/DIAG IV INF ADDON: CPT

## 2020-03-24 PROCEDURE — 85027 COMPLETE CBC AUTOMATED: CPT

## 2020-03-24 PROCEDURE — 74011250637 HC RX REV CODE- 250/637: Performed by: EMERGENCY MEDICINE

## 2020-03-24 PROCEDURE — 96375 TX/PRO/DX INJ NEW DRUG ADDON: CPT

## 2020-03-24 PROCEDURE — 93005 ELECTROCARDIOGRAM TRACING: CPT | Performed by: EMERGENCY MEDICINE

## 2020-03-24 PROCEDURE — 96365 THER/PROPH/DIAG IV INF INIT: CPT

## 2020-03-24 RX ORDER — ALBUTEROL SULFATE 0.83 MG/ML
5 SOLUTION RESPIRATORY (INHALATION) CONTINUOUS
Status: DISCONTINUED | OUTPATIENT
Start: 2020-03-24 | End: 2020-03-24 | Stop reason: HOSPADM

## 2020-03-24 RX ORDER — MAGNESIUM SULFATE HEPTAHYDRATE 40 MG/ML
2 INJECTION, SOLUTION INTRAVENOUS
Status: COMPLETED | OUTPATIENT
Start: 2020-03-24 | End: 2020-03-24

## 2020-03-24 RX ORDER — POTASSIUM CHLORIDE 20 MEQ/1
40 TABLET, EXTENDED RELEASE ORAL
Status: COMPLETED | OUTPATIENT
Start: 2020-03-24 | End: 2020-03-24

## 2020-03-24 RX ORDER — ALBUTEROL SULFATE 0.83 MG/ML
10 SOLUTION RESPIRATORY (INHALATION)
Status: COMPLETED | OUTPATIENT
Start: 2020-03-24 | End: 2020-03-24

## 2020-03-24 RX ADMIN — MAGNESIUM SULFATE HEPTAHYDRATE 2 G: 40 INJECTION, SOLUTION INTRAVENOUS at 10:19

## 2020-03-24 RX ADMIN — METHYLPREDNISOLONE SODIUM SUCCINATE 125 MG: 125 INJECTION, POWDER, FOR SOLUTION INTRAMUSCULAR; INTRAVENOUS at 10:18

## 2020-03-24 RX ADMIN — POTASSIUM CHLORIDE 40 MEQ: 20 TABLET, EXTENDED RELEASE ORAL at 10:18

## 2020-03-24 RX ADMIN — ALBUTEROL SULFATE 10 MG: 2.5 SOLUTION RESPIRATORY (INHALATION) at 10:25

## 2020-03-24 NOTE — LETTER
[address] [facility] EMERGENCY DEPT 
[address] [facility] 
[phone] Work/School Note Date: 3/24/2020 To Whom It May concern: 
 
Gilmer Victoria was seen and treated today in the emergency room by the following provider(s): 
Attending Provider: Yaima Storey MD.   
 
Gilmer Victoria may return to work on 3/26/20. Sincerely, Miguelina Burch

## 2020-03-24 NOTE — ED TRIAGE NOTES
Pt from [facility], states she has become extremely short of breath, worsening since Thursday. Hx of asthma, states she has been using her nebulizer at home without relief. States no fever. On arrival to ED, pt extremely short of breath and significant work of breathing. No fever at home, no known exposure. Works in [employer]. Pt wearing mask at time of triage. Brought over by Jennifer Riley NP from [facility]. NP states that pt would not wear mask at time of arrival to [facility] due to shortness of breath.  States Jennifer Riley NP and MartinKavita huddleston were exposed in office if patient is tested for COVID-19

## 2020-03-24 NOTE — DISCHARGE INSTRUCTIONS
Albuterol 2-4 puffs every 2 hours today  Then every 4 hours tomorrrow  Then every 6 hours     Come back if you get worse in any way or have new problems

## 2020-03-24 NOTE — ED PROVIDER NOTES
[facility] Emergency Department  Arrival Date/Time: 3/24/2020 @  9:49 AM      Junior Case  MRN: [id number]      6 y.o. female    YOB: 1960   [phone] (home)     [facility] EMERGENCY DEPT ER02/02  Seen on 3/24/2020 @ 9:52 AM      Today's Chief Complaint: No chief complaint on file. 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 Went over to [facility] this morning was referred back to the emergency department for evaluation    She is noted to be tachycardic at 135. Maintaining oxygen saturations in the mid to upper 90s    No fever. Pronounced cough. HPI    Review of Systems: Review of Systems   Constitutional: Positive for activity change and fatigue. Negative for appetite change, chills and fever. HENT: Negative for congestion, rhinorrhea and sore throat. Respiratory: Positive for cough, shortness of breath and wheezing. Cardiovascular: Positive for chest pain. Gastrointestinal: Negative. Genitourinary: Negative. Musculoskeletal: Negative. Skin: Negative. Neurological: Negative. Psychiatric/Behavioral: Negative. Past Medical History: Primary Care Doctor: Carina Kothari, DO  Meds, PMH, PSHx, SocHx at end of this note     Allergies: Allergies   Allergen Reactions    Codeine Itching    Meperidine Other (comments)     Hallucinations         Key Anti-Platelet Anticoagulant Meds     The patient is on no antiplatelet meds or anticoagulants. Physical Exam:  Nursing documentation reviewed. No data found. Vital signs were reviewed. Physical Exam  Vitals signs and nursing note reviewed. Constitutional:       General: She is not in acute distress. Appearance: Normal appearance. She is ill-appearing. She is not toxic-appearing.    HENT:      Head: Normocephalic and atraumatic. Nose: Congestion present. Mouth/Throat:      Mouth: Mucous membranes are moist.   Eyes:      Extraocular Movements: Extraocular movements intact. Pupils: Pupils are equal, round, and reactive to light. Cardiovascular:      Rate and Rhythm: Tachycardia present. Pulses: Normal pulses. Heart sounds: Normal heart sounds. Pulmonary:      Effort: No respiratory distress. Breath sounds: Wheezing present. Abdominal:      General: There is no distension. Tenderness: There is no abdominal tenderness. There is no guarding. Musculoskeletal: Normal range of motion. Skin:     General: Skin is warm and dry. Capillary Refill: Capillary refill takes less than 2 seconds. Neurological:      General: No focal deficit present. Mental Status: She is alert and oriented to person, place, and time. Psychiatric:         Mood and Affect: Mood normal.         Behavior: Behavior normal.         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 No fever    Initially tachycardic    Suspect this is a COPD or asthma exacerbation.   We will start with nebs and reassess       Amount and/or Complexity of Data Reviewed  Clinical lab tests: ordered and reviewed  Tests in the radiology section of CPT®: ordered and reviewed  Decide to obtain previous medical records or to obtain history from someone other than the patient: yes  Discuss the patient with other providers: yes    Risk of Complications, Morbidity, and/or Mortality  Presenting problems: moderate  Diagnostic procedures: minimal  Management options: moderate          Data/Management:    Lab findings during this visit:   Results for orders placed or performed during the hospital encounter of 03/24/20   CBC W/O DIFF   Result Value Ref Range    WBC 7.7 4.3 - 11.1 K/uL    RBC 4.65 4.05 - 5.2 M/uL    HGB 13.2 11.7 - 15.4 g/dL    HCT 40.0 35.8 - 46.3 %    MCV 86.0 79.6 - 97.8 FL    MCH 28.4 26.1 - 32.9 PG    MCHC 33.0 31.4 - 35.0 g/dL    RDW 13.6 11.9 - 14.6 %    PLATELET 604 558 - 686 K/uL    MPV 10.0 9.4 - 12.3 FL    ABSOLUTE NRBC 0.00 0.0 - 0.2 K/uL   METABOLIC PANEL, BASIC   Result Value Ref Range    Sodium 138 136 - 145 mmol/L    Potassium 3.5 3.5 - 5.1 mmol/L    Chloride 104 98 - 107 mmol/L    CO2 22 21 - 32 mmol/L    Anion gap 12 7 - 16 mmol/L    Glucose 334 (H) 65 - 100 mg/dL    BUN 13 8 - 23 MG/DL    Creatinine 0.90 0.6 - 1.0 MG/DL    GFR est AA >60 >60 ml/min/1.73m2    GFR est non-AA >60 >60 ml/min/1.73m2    Calcium 9.7 8.3 - 10.4 MG/DL   EKG, 12 LEAD, INITIAL   Result Value Ref Range    Ventricular Rate 129 BPM    Atrial Rate 129 BPM    P-R Interval 134 ms    QRS Duration 68 ms    Q-T Interval 298 ms    QTC Calculation (Bezet) 436 ms    Calculated P Axis 69 degrees    Calculated R Axis 57 degrees    Calculated T Axis 64 degrees    Diagnosis       !! AGE AND GENDER SPECIFIC ECG ANALYSIS !! Sinus tachycardia  Poor R wave progression  Abnormal ECG  When compared with ECG of 07-AUG-2017 20:29,  rate has increased    Confirmed by [facility]  MD ()JESSICA ([id number]) on 3/24/2020 12:38:36 PM        Radiology studies during this visit:   XR CHEST PORT   Final Result   IMPRESSION: Slightly underexpanded lungs, otherwise no acute abnormality.         Medications given in the ED:   Medications   albuterol (PROVENTIL VENTOLIN) nebulizer solution 10 mg (10 mg Nebulization Given 3/24/20 1025)   methylPREDNISolone (PF) (Solu-MEDROL) injection 125 mg (125 mg IntraVENous Given 3/24/20 1018)   magnesium sulfate 2 g/50 ml IVPB (premix or compounded) (0 g IntraVENous IV Completed 3/24/20 1327)   potassium chloride (K-DUR, KLOR-CON) SR tablet 40 mEq (40 mEq Oral Given 3/24/20 1018)       Recheck and Additional Documentation:  (use .addrecheck  . addsepsis   . addstroke   . addhip  . addhandoff  . addcctime)     Patient given nebs here in the emergency department. Wheezing decreased ox saturations improved. She is feeling better. Will discharge home on medications. Have her follow-up with her primary care physician. Procedure Documentation:   Procedures     Other ED Course Notes:     ED Course as of Mar 27 1017   Tue Mar 24, 2020   1128 Better on recheck. decreased wheezing. Oxygen saturations have improved. Still tachycardic but this could be related to the 10 mg of albuterol. [GH]      ED Course User Index  [GH] Shelbi Boateng MD       Assessment and Plan:    Impression:     ICD-10-CM ICD-9-CM   1.  Moderate asthma with acute exacerbation, unspecified whether persistent J45.901 493.92     Disposition:  Home  Follow-up:   Follow-up Information     Follow up With Specialties Details Why Contact Info    Ryan Schwartz DO [place] [id number]  [phone]      [facility] EMERGENCY DEPT Emergency Medicine   [facility] [address] [id number]  [address] Med:   Discharge Medication List as of 3/24/2020  1:14 PM          Past Medical History:      Past Medical History:   Diagnosis Date    Anxiety     no meds at present    Asthma     prn inhaler    Depression     no meds at present    Diabetes mellitus type 2, controlled, without complications ([address]     type 2-- sqbs avg am- ---  no hypo    Dyslipidemia 2010    Hypertension     controlled with med    Migraines     Right knee pain     SVT (supraventricular tachycardia) ([address]     pt states not recent problem-- none x 2 yrs-- no meds-- per pt-- cough would stop it    Vertigo     Vitamin D deficiency     resolved--- not a recent issue     Past Surgical History:   Procedure Laterality Date    HX  SECTION      HX LAP CHOLECYSTECTOMY 2013    HX MICHI AND BSO  2001    Hysterectomy    HX TONSILLECTOMY  as child    HX TUBAL LIGATION Bilateral          Social History     Tobacco Use    Smoking status: Never Smoker    Smokeless tobacco: Never Used   Substance Use Topics    Alcohol use: No    Drug use: No     Prior to Admission Medications   Prescriptions Last Dose Informant Patient Reported? Taking? albuterol (VENTOLIN HFA) 90 mcg/actuation inhaler   No No   Sig: Take 2 Puffs by inhalation every four (4) hours as needed for Wheezing. amLODIPine (NORVASC) 5 mg tablet   No No   Sig: Take 1 Tab by mouth daily. atorvastatin (LIPITOR) 20 mg tablet   No No   Sig: Take 1 Tab by mouth daily. dapagliflozin (FARXIGA) 10 mg tab tablet   No No   Sig: Take 1 Tab by mouth daily. dulaglutide (TRULICITY) 1.5 FRYE/9.0 mL sub-q pen   No No   Si.5 mL by SubCUTAneous route every seven (7) days. Indications: type 2 diabetes mellitus   fluticasone-salmeterol (ADVAIR DISKUS) 250-50 mcg/dose diskus inhaler   No No   Sig: Take 1 Puff by inhalation every twelve (12) hours. Patient taking differently: Take 1 Puff by inhalation two (2) times daily as needed. losartan-hydroCHLOROthiazide (HYZAAR) 100-25 mg per tablet   No No   Sig: Take 1 Tab by mouth daily. meclizine (ANTIVERT) 25 mg tablet   No No   Sig: Take 1 Tab by mouth three (3) times daily as needed. omega-3 acid ethyl esters (LOVAZA) 1 gram capsule   No No   Sig: Take 2 Caps by mouth two (2) times a day. potassium chloride SA (MICRO-K) 10 mEq capsule   No No   Sig: Take 1 Cap by mouth two (2) times a day.       Facility-Administered Medications: None

## 2020-03-24 NOTE — LETTER
[address] [facility] EMERGENCY DEPT 
[address] [facility] [address] 
[phone] Work/School Note Date: 3/24/2020 To Whom It May concern: 
 
Nallely Aguilar was seen and treated today in the emergency room by the following provider(s): 
Attending Provider: Casey Hernandez MD.   
 
Nallely Aguilar may return to work on 3/26/20. Sincerely, Severa Kale

## 2020-03-24 NOTE — ED NOTES
I have reviewed discharge instructions with the patient. The patient verbalized understanding. Patient left ED via Discharge Method: ambulatory to Home with self  Opportunity for questions and clarification provided. Patient given 0 scripts. No esign        To continue your aftercare when you leave the hospital, you may receive an automated call from our care team to check in on how you are doing. This is a free service and part of our promise to provide the best care and service to meet your aftercare needs.  If you have questions, or wish to unsubscribe from this service please call [phone]. Thank you for Choosing our [facility] Emergency Department.

## 2020-03-25 ENCOUNTER — PATIENT OUTREACH (OUTPATIENT)
Dept: OTHER | Age: 60
End: 2020-03-25

## 2020-03-25 NOTE — PROGRESS NOTES
At 916am  Patient on report as eligible for Case Management. Left discreet message on voicemail with this CM contact information. Will attempt to contact again to offer [address] Management services. At 1530  Incoming call from patient, verified  and Zip code as identifiers; Patient eligible for [employer] Employee care management. Provided introduction and explanation of the Nurse Care Manager role. Agreed to CM follow-up and assistance at this time. Received notification of ER visit on 20 at [facility] for asthma exacerbation. PMH:   Past Medical History:   Diagnosis Date    Anxiety     no meds at present    Asthma     prn inhaler    Depression     no meds at present    Diabetes mellitus type 2, controlled, without complications ([facility] [address]     type 2-- sqbs avg am- ----  no hypo    Dyslipidemia 2010    Hypertension     controlled with med    Migraines     Right knee pain     SVT (supraventricular tachycardia) ([facility]     pt states not recent problem-- none x 2 yrs-- no meds-- per pt-- cough would stop it    Vertigo     Vitamin D deficiency     resolved--- not a recent issue       Patient has significant diagnosis of Asthma, DM type 2 and HTN. Care management assessment completed:    Respiratory Condition Focused Assessment  Supplemental oxygen use?  no   Clean and working equipment? Yes, use of nebulizer at home; No barriers with purchase of nebs medications;  Cough   yes with SOB    Patient reported important numbers to know:  Oxygen level No home Pulse Oximeter   Temperature Reports afebrile   Description of any sputum Clear thick   Pain N/A   Weight 169lbs     Any change in activity level?  yes  Any of the following? Shortness of breath or difficulty breathing yes   Dizziness/lightheadedness yes   Fever no   Low body temperature no   Chills or shaking no   Sweating no    Dyspnea on exertion yes, but improved following DC;      Fatigue no Anxiety no    Confusionno   Unexplained change in weight loss no   Sleep disturbance no     Incentive Spirometer? yes   Does patient have action plan? yes     Red Flags:  Call your doctor now or seek immediate medical care if:  · Your cough or wheezing worsens;  · Your symptoms do not improve;  · You need to use your quick relief medication on more than 2 days/wk;  · You cough up yellow, dark brown or bloody mucus; Initial Care Plan for Chronic Disease:    Impaired Gas Exchange, asthma exacerbation  Goal:  Demonstrates self-management skills to decrease symptoms of asthma, lessen SOB in next 30 days;  o Reviewed her current asthma Action Plan;  o Discussed monitoring for changes in levels of CRENSHAW and when to report;  o Review of red flags and when to report;  o Encouraged use of home nebs with period of SOB during day;  o Reviewed and discussed changes to her asthma medication regimen;    Risk for Infection, steroids - immunosuppression risk for COVID-19  Goal:  Demonstrates behaviors to prevent or lessen risk of infection;   Educate about the signs and symptoms of infection, including COVID 19 risk:  o Fever;  o Odorous, yellow or greenish secretions;   Discussed activities that promote expectoration of secretions;  o breathing exercises  o effective cough  o adequate fluid intake;   Reviewed monitoring for changes in color, character or odor of phlegm/sputum;   Teach infection control techniques:  o Stress proper handwashing;  o Avoid visitors with illness, coughs or sneezes;      o Use of masks in large crowds of people during flu season;  o Discussed exposure protocols and quarantine from [employer] Guidelines Are you at higher risk for severe illness 2019;  - Provided an opportunity for questions and concerns. o Patient agrees to contact the COVID-19 hotline [phone] or PCP office for questions related to their healthcare. ;  · From CDC: Are you at higher risk for severe illness?     · Wash your hands often.    · Avoid close contact (6 feet, which is about two arm lengths) with people who are sick. · Put distance between yourself and other people if COVID-19 is spreading in your community. · Clean and disinfect frequently touched surfaces. · Avoid all cruise travel and non-essential air travel. · Call your healthcare professional if you have concerns about COVID-19 and your underlying condition or if you are sick. · For more information on steps you can take to protect yourself, see CDC's How to Protect Yourself     Emotional Status/Adjustment to Health State: reports feeling better, less SOB; Readiness to Change: []  Pre-contemplative    []  Contemplative  []  Preparation               [x]  Action       []  Maintenance    Barriers/Challenges to Care:   []  Decline in memory    []  Language barrier  []  Emotional   []  Limited mobility  []  Lack of motivation     [] Vision, hearing or cognitive impairment  [x]  Learning Need    [] Financial Barriers     []  Other       Current Outpatient Medications   Medication Sig    NEBULIZER by Does Not Apply route.  albuterol (Ventolin HFA) 90 mcg/actuation inhaler Take 2 Puffs by inhalation every four (4) hours as needed for Wheezing.  albuterol (PROVENTIL VENTOLIN) 2.5 mg /3 mL (0.083 %) nebu 3 mL by Nebulization route every four (4) hours as needed for Wheezing or Shortness of Breath.  predniSONE (DELTASONE) 20 mg tablet Take 20 mg by mouth two (2) times a day for 5 days.  amLODIPine (NORVASC) 5 mg tablet Take 1 Tab by mouth daily.  atorvastatin (LIPITOR) 20 mg tablet Take 1 Tab by mouth daily.  losartan-hydroCHLOROthiazide (HYZAAR) 100-25 mg per tablet Take 1 Tab by mouth daily.  potassium chloride SA (MICRO-K) 10 mEq capsule Take 1 Cap by mouth two (2) times a day.  dapagliflozin (FARXIGA) 10 mg tab tablet Take 1 Tab by mouth daily.     fluticasone-salmeterol (ADVAIR DISKUS) 250-50 mcg/dose diskus inhaler Take 1 Puff by inhalation every twelve (12) hours. (Patient taking differently: Take 1 Puff by inhalation two (2) times daily as needed.)    meclizine (ANTIVERT) 25 mg tablet Take 1 Tab by mouth three (3) times daily as needed. No current facility-administered medications for this visit. Completed a review of medications with patient, who verbalized understanding of how and when to take medications. Barriers / Adherence with medications:  No barriers identified;    Upcoming appointments:   Future Appointments   Date Time Provider Carlo Sullivan   4/1/2020  8:00 AM JAS Love PFP PFP       Patient verbalized understanding of all information discussed. Pt has my contact information for any further questions, concerns, or needs.     Plan for next call:  FU on continued symptoms and reassessment with SOB;

## 2020-04-14 ENCOUNTER — PATIENT OUTREACH (OUTPATIENT)
Dept: OTHER | Age: 60
End: 2020-04-14

## 2020-04-20 ENCOUNTER — HOSPITAL ENCOUNTER (EMERGENCY)
Age: 60
Discharge: HOME | End: 2020-04-20
Attending: EMERGENCY MEDICINE
Payer: COMMERCIAL

## 2020-04-20 ENCOUNTER — NURSE TRIAGE (OUTPATIENT)
Dept: OTHER | Facility: CLINIC | Age: 60
End: 2020-04-20

## 2020-04-20 ENCOUNTER — APPOINTMENT (OUTPATIENT)
Dept: GENERAL RADIOLOGY | Age: 60
End: 2020-04-20
Attending: EMERGENCY MEDICINE
Payer: COMMERCIAL

## 2020-04-20 ENCOUNTER — APPOINTMENT (OUTPATIENT)
Dept: CT IMAGING | Age: 60
End: 2020-04-20
Attending: EMERGENCY MEDICINE
Payer: COMMERCIAL

## 2020-04-20 VITALS
RESPIRATION RATE: 20 BRPM | OXYGEN SATURATION: 98 % | TEMPERATURE: 98.3 F | DIASTOLIC BLOOD PRESSURE: 78 MMHG | WEIGHT: 160 LBS | SYSTOLIC BLOOD PRESSURE: 157 MMHG | BODY MASS INDEX: 28.35 KG/M2 | HEART RATE: 95 BPM | HEIGHT: 63 IN

## 2020-04-20 DIAGNOSIS — R06.00 DYSPNEA, UNSPECIFIED TYPE: Primary | ICD-10-CM

## 2020-04-20 DIAGNOSIS — J45.41 MODERATE PERSISTENT ASTHMA WITH ACUTE EXACERBATION: ICD-10-CM

## 2020-04-20 LAB
ALBUMIN SERPL-MCNC: 3.8 G/DL (ref 3.2–4.6)
ALBUMIN/GLOB SERPL: 0.9 {RATIO} (ref 1.2–3.5)
ALP SERPL-CCNC: 115 U/L (ref 50–136)
ALT SERPL-CCNC: 33 U/L (ref 12–65)
ANION GAP SERPL CALC-SCNC: 10 MMOL/L (ref 7–16)
AST SERPL-CCNC: 22 U/L (ref 15–37)
ATRIAL RATE: 116 BPM
BASOPHILS # BLD: 0 K/UL (ref 0–0.2)
BASOPHILS NFR BLD: 1 % (ref 0–2)
BILIRUB SERPL-MCNC: 0.5 MG/DL (ref 0.2–1.1)
BNP SERPL-MCNC: 20 PG/ML (ref 5–125)
BUN SERPL-MCNC: 13 MG/DL (ref 8–23)
CALCIUM SERPL-MCNC: 9.6 MG/DL (ref 8.3–10.4)
CALCULATED P AXIS, ECG09: 78 DEGREES
CALCULATED R AXIS, ECG10: 55 DEGREES
CALCULATED T AXIS, ECG11: 70 DEGREES
CHLORIDE SERPL-SCNC: 109 MMOL/L (ref 98–107)
CO2 SERPL-SCNC: 22 MMOL/L (ref 21–32)
CREAT SERPL-MCNC: 0.93 MG/DL (ref 0.6–1)
DIAGNOSIS, 93000: NORMAL
DIFFERENTIAL METHOD BLD: NORMAL
EOSINOPHIL # BLD: 0.1 K/UL (ref 0–0.8)
EOSINOPHIL NFR BLD: 1 % (ref 0.5–7.8)
ERYTHROCYTE [DISTWIDTH] IN BLOOD BY AUTOMATED COUNT: 12.9 % (ref 11.9–14.6)
GLOBULIN SER CALC-MCNC: 4.4 G/DL (ref 2.3–3.5)
GLUCOSE SERPL-MCNC: 184 MG/DL (ref 65–100)
HCT VFR BLD AUTO: 42.5 % (ref 35.8–46.3)
HGB BLD-MCNC: 13.6 G/DL (ref 11.7–15.4)
IMM GRANULOCYTES # BLD AUTO: 0 K/UL (ref 0–0.5)
IMM GRANULOCYTES NFR BLD AUTO: 0 % (ref 0–5)
LYMPHOCYTES # BLD: 2.6 K/UL (ref 0.5–4.6)
LYMPHOCYTES NFR BLD: 35 % (ref 13–44)
MCH RBC QN AUTO: 26.7 PG (ref 26.1–32.9)
MCHC RBC AUTO-ENTMCNC: 32 G/DL (ref 31.4–35)
MCV RBC AUTO: 83.5 FL (ref 79.6–97.8)
MONOCYTES # BLD: 0.6 K/UL (ref 0.1–1.3)
MONOCYTES NFR BLD: 8 % (ref 4–12)
NEUTS SEG # BLD: 4.1 K/UL (ref 1.7–8.2)
NEUTS SEG NFR BLD: 55 % (ref 43–78)
NRBC # BLD: 0 K/UL (ref 0–0.2)
P-R INTERVAL, ECG05: 136 MS
PLATELET # BLD AUTO: 341 K/UL (ref 150–450)
PMV BLD AUTO: 10 FL (ref 9.4–12.3)
POTASSIUM SERPL-SCNC: 3.3 MMOL/L (ref 3.5–5.1)
PROT SERPL-MCNC: 8.2 G/DL (ref 6.3–8.2)
Q-T INTERVAL, ECG07: 318 MS
QRS DURATION, ECG06: 66 MS
QTC CALCULATION (BEZET), ECG08: 442 MS
RBC # BLD AUTO: 5.09 M/UL (ref 4.05–5.2)
SODIUM SERPL-SCNC: 141 MMOL/L (ref 136–145)
TROPONIN I SERPL-MCNC: <0.02 NG/ML (ref 0.02–0.05)
VENTRICULAR RATE, ECG03: 116 BPM
WBC # BLD AUTO: 7.4 K/UL (ref 4.3–11.1)

## 2020-04-20 PROCEDURE — 74011000250 HC RX REV CODE- 250: Performed by: EMERGENCY MEDICINE

## 2020-04-20 PROCEDURE — 83880 ASSAY OF NATRIURETIC PEPTIDE: CPT

## 2020-04-20 PROCEDURE — 71260 CT THORAX DX C+: CPT

## 2020-04-20 PROCEDURE — 93005 ELECTROCARDIOGRAM TRACING: CPT | Performed by: EMERGENCY MEDICINE

## 2020-04-20 PROCEDURE — 71045 X-RAY EXAM CHEST 1 VIEW: CPT

## 2020-04-20 PROCEDURE — 77030013140 HC MSK NEB VYRM -A

## 2020-04-20 PROCEDURE — 84484 ASSAY OF TROPONIN QUANT: CPT

## 2020-04-20 PROCEDURE — 94640 AIRWAY INHALATION TREATMENT: CPT

## 2020-04-20 PROCEDURE — 85025 COMPLETE CBC W/AUTO DIFF WBC: CPT

## 2020-04-20 PROCEDURE — 74011636320 HC RX REV CODE- 636/320: Performed by: EMERGENCY MEDICINE

## 2020-04-20 PROCEDURE — 74011000258 HC RX REV CODE- 258: Performed by: EMERGENCY MEDICINE

## 2020-04-20 PROCEDURE — 99283 EMERGENCY DEPT VISIT LOW MDM: CPT

## 2020-04-20 PROCEDURE — 80053 COMPREHEN METABOLIC PANEL: CPT

## 2020-04-20 RX ORDER — SODIUM CHLORIDE 0.9 % (FLUSH) 0.9 %
10 SYRINGE (ML) INJECTION
Status: COMPLETED | OUTPATIENT
Start: 2020-04-20 | End: 2020-04-20

## 2020-04-20 RX ORDER — IPRATROPIUM BROMIDE AND ALBUTEROL SULFATE 2.5; .5 MG/3ML; MG/3ML
3 SOLUTION RESPIRATORY (INHALATION)
Status: COMPLETED | OUTPATIENT
Start: 2020-04-20 | End: 2020-04-20

## 2020-04-20 RX ADMIN — IOPAMIDOL 100 ML: 755 INJECTION, SOLUTION INTRAVENOUS at 13:46

## 2020-04-20 RX ADMIN — Medication 10 ML: at 13:46

## 2020-04-20 RX ADMIN — SODIUM CHLORIDE 100 ML: 900 INJECTION, SOLUTION INTRAVENOUS at 13:46

## 2020-04-20 RX ADMIN — IPRATROPIUM BROMIDE AND ALBUTEROL SULFATE 3 ML: .5; 3 SOLUTION RESPIRATORY (INHALATION) at 12:41

## 2020-04-20 NOTE — TELEPHONE ENCOUNTER
Reason for Disposition   Sounds like a life-threatening emergency to the triager    Protocols used: BREATHING DIFFICULTY-ADULT-[place]    Caller sounds like she is in severe respiratory distress. Caller is barely audible and she is struggling for air. Informed caller to hang up and call 911. Caller states she will take her self to ED which is 3 minutes away. Caller will go to [address] [place] [address].

## 2020-04-20 NOTE — ACP (ADVANCE CARE PLANNING)
Met with patient in the ER, wearing appropriate PPE / did not enter room / stood at doorway. Patient does not currently have ACP paperwork completed. Per Dr. Adam Carbone, patient has capacity to make decisions.

## 2020-04-20 NOTE — ED PROVIDER NOTES
Patient has a history of type 2 diabetes, hypertension, hyperlipidemia, COPD, asthma and CHF. She states she has had dyspnea for the past 3 weeks. She has been using her home inhaler and nebulizer with moderate improvement in her symptoms. She has been out of work for the past 3 weeks because of her dyspnea. She also has had a cough, denies any fever. She was on steroids 2 weeks ago with no significant improvement. She states this morning her symptoms were much worse thus she came here for evaluation.            Past Medical History:   Diagnosis Date    Anxiety     no meds at present    Asthma     prn inhaler    Depression     no meds at present    Diabetes mellitus type 2, controlled, without complications ([facility] [address]     type 2-- sqbs avg am- ---  no hypo    Dyslipidemia 2010    Hypertension     controlled with med    Migraines     Right knee pain     SVT (supraventricular tachycardia) (HCC)     pt states not recent problem-- none x 2 yrs-- no meds-- per pt-- cough would stop it    Vertigo     Vitamin D deficiency     resolved--- not a recent issue       Past Surgical History:   Procedure Laterality Date    HX  SECTION      HX LAP CHOLECYSTECTOMY      HX MICHI AND BSO  2001    Hysterectomy    HX TONSILLECTOMY  as child    HX TUBAL LIGATION Bilateral              Family History:   Problem Relation Age of Onset    Cancer Mother         colon    Elevated Lipids Mother     Cancer Father         colon    Diabetes Father         T2DM    Other Brother         sleep apnea       Social History     Socioeconomic History    Marital status:      Spouse name: Not on file    Number of children: Not on file    Years of education: Not on file    Highest education level: Not on file   Occupational History    Not on file   Social Needs    Financial resource strain: Not on file    Food insecurity     Worry: Not on file     Inability: Not on file   Nefsis needs Medical: Not on file     Non-medical: Not on file   Tobacco Use    Smoking status: Never Smoker    Smokeless tobacco: Never Used   Substance and Sexual Activity    Alcohol use: No    Drug use: No    Sexual activity: Not on file   Lifestyle    Physical activity     Days per week: Not on file     Minutes per session: Not on file    Stress: Not on file   Relationships    Social connections     Talks on phone: Not on file     Gets together: Not on file     Attends Caodaism service: Not on file     Active member of club or organization: Not on file     Attends meetings of clubs or organizations: Not on file     Relationship status: Not on file    Intimate partner violence     Fear of current or ex partner: Not on file     Emotionally abused: Not on file     Physically abused: Not on file     Forced sexual activity: Not on file   Other Topics Concern    Not on file   Social History Narrative    Not on file         ALLERGIES: Codeine and Meperidine    Review of Systems   Constitutional: Negative for chills and fever. Respiratory: Positive for cough and shortness of breath. Gastrointestinal: Negative for nausea and vomiting. All other systems reviewed and are negative. Vitals:    04/20/20 1154   BP: 152/79   Pulse: (!) 137   Resp: 30   Temp: 98.3 °F (36.8 °C)   SpO2: 99%   Weight: 72.6 kg (160 lb)   Height: 5' 3\" (1.6 m)            Physical Exam  Vitals signs and nursing note reviewed. Constitutional:       Appearance: She is well-developed. Comments: Patient appears anxious with tachypnea   HENT:      Head: Normocephalic and atraumatic. Eyes:      Conjunctiva/sclera: Conjunctivae normal.      Pupils: Pupils are equal, round, and reactive to light. Neck:      Musculoskeletal: Normal range of motion and neck supple. Cardiovascular:      Rate and Rhythm: Regular rhythm. Tachycardia present. Heart sounds: Normal heart sounds. Pulmonary:      Breath sounds: Normal breath sounds. No stridor. No wheezing, rhonchi or rales. Comments: Patient is tachypneic and appears dyspneic  Abdominal:      General: Bowel sounds are normal.      Palpations: Abdomen is soft. Musculoskeletal:         General: No tenderness. Right lower leg: No edema. Left lower leg: No edema. Skin:     General: Skin is warm and dry. Neurological:      General: No focal deficit present. Mental Status: She is alert and oriented to person, place, and time. Psychiatric:         Mood and Affect: Mood normal.         Behavior: Behavior normal.          MDM  Number of Diagnoses or Management Options  Dyspnea, unspecified type: new and requires workup  Moderate persistent asthma with acute exacerbation:   Diagnosis management comments: 12:12 PM differential diagnosis includes asthma, COPD, CHF, PE, pneumonia, COVID-19, pulmonary hypertension, acute coronary syndrome, pericardial effusion. She appears anxious and is tachypnea with normal lung sounds and an O2 saturation of 99% on room air. She will be given a DuoNeb treatment, likely will get a CT scan to evaluate for possible PE.  3:10 PM patient states she is feeling much better, appears more comfortable. CT scan was unremarkable other than some minor bibasilar groundglass opacities. I will send off a COVID-19 test given that she works in the [employer] Discussed the need for isolation with her until her test comes back.        Amount and/or Complexity of Data Reviewed  Clinical lab tests: ordered and reviewed  Tests in the radiology section of CPT®: ordered and reviewed  Tests in the medicine section of CPT®: reviewed and ordered    Risk of Complications, Morbidity, and/or Mortality  Presenting problems: high  Diagnostic procedures: moderate  Management options: moderate    Patient Progress  Patient progress: improved         Procedures

## 2020-04-20 NOTE — ACP (ADVANCE CARE PLANNING)
Advance Care Planning Clinical Specialist  Conversation Note      Date of ACP Conversation: 4/20/2020    Conversation Conducted with:   Patient with Decision Making Capacity   Healthcare Decision Maker: n/a - per Dr. Janiya Carcamo, patient has capacity to make decisions    ACP Clinical Specialist: Usman Zee RN    *When Decision Maker makes decisions on behalf of the incapacitated patient: Decision Maker is asked to consider and make decisions based on patient values, known preferences, or best interests. Current Designated Health Care Decision Maker:   (as [facility] in [facility] field. Validate  this information as still accurate & up-to-date; edit [place] field as needed.)    If no Decision Maker listed above or available through scanned documents, then:    [address] [place] [address]   Who do you trust to make healthcare decisions for you? Name:   Jayesh Whitfield (son)  Phone  Number: [phone]  Can this person be reached and be able to respond quickly, such as within a few minutes or hours? yes    Who would be your back-up decision maker? Name: Harris Schilder  Phone Number: [phone]    For below questions, when conducting conversation with Dewaynejeremiah, substitute \"she\" and \"her\" for \"you\" and \"your\". Hospitalization  If your health were to worsen and it became clear that your chance of recovery was unlikely, what would your preferences be regarding hospitalization?: n/a    Choice:  []  The patient would want hospitalization  []  The patient would prefer comfort-focused treatment without hospitalization. Ventilation  If you were in your present state of health and suddenly became very ill and were unable to breathe on your own, what would your preference be about the use of a ventilator (breathing machine) if it were available to you?       If patient would desire the use of a ventilator (breathing machine), answer \"yes\", if not \"no\":  NO    If your health were to worsen and it became clear that your chance of recovery was unlikely, would that change your answer? NO    Resuscitation  CPR works best to restart the heart when there is a sudden event, like a heart attack, in someone who is otherwise healthy. Unfortunately, CPR does not typically restart the heart for people who have serious health conditions or who are very sick. In the event your heart stopped, would you want attempts to restart your heart (answer \"yes\") or would you prefer a natural death (answer \"no\")? NO    If your health were to worsen and it became clear that your chance of recovery was unlikely, would that change your answer? NO    [] Yes  [] No   Educated Patient / Decision Maker regarding differences between Advance Directives and portable DNR orders. Length of ACP Conversation in minutes:      Conversation Outcomes:  [] ACP discussion completed  [] Existing advance directive reviewed with patient; no changes to patient's previously recorded wishes   [] New Advance Directive completed   [] Portable Do Not Rescitate prepared for Provider review and signature  [] POLST/POST/MOLST/MOST prepared for Provider review and signature      Follow-up plan:    [] Schedule follow-up conversation to continue planning  [] Referred individual to Provider for additional questions/concerns   [] Advised patient/agent/surrogate to review completed ACP document and update if needed with changes in condition, patient preferences or care setting     [x] This note routed to one or more involved healthcare providers - Dr. Nicky Joseph. Patient does not want to follow up with  services at this time to complete any HCPOA paperwork. Patient states she will follow up with her two sons and have paperwork completed afterward. These are patient's current wishes as discussed at bedside today and are not intended to take place of [address]

## 2020-04-20 NOTE — ED TRIAGE NOTES
Patient with hx of asthma and COPD. Complains of SOB that began about 1 hour pta. Patient appears in distress in triage. SpO2 98%. Patient reports cough as well with clear sputum production. Patient states she used nebulizer treatment at home without relief. Reports hx of admission due to asthma. Patient states she was supposed to  her Trulicity today, but was unable to pick it up. Patient with mask on in triage.

## 2020-04-20 NOTE — ED NOTES
I have reviewed discharge instructions with the patient. The patient verbalized understanding. Patient left ED via Discharge Method: ambulatory to Home with self. Opportunity for questions and clarification provided. Patient given 0 scripts. To continue your aftercare when you leave the hospital, you may receive an automated call from our care team to check in on how you are doing. This is a free service and part of our promise to provide the best care and service to meet your aftercare needs.  If you have questions, or wish to unsubscribe from this service please call [phone]. Thank you for Choosing our [facility] Emergency Department.

## 2020-04-20 NOTE — DISCHARGE INSTRUCTIONS
Follow-up with your doctor, call the office to make an appointment. Return to the emergency department if your symptoms worsen. We will call you if your COVID-19 test is positive.

## 2020-04-21 ENCOUNTER — PATIENT OUTREACH (OUTPATIENT)
Dept: OTHER | Age: 60
End: 2020-04-21

## 2020-04-21 ENCOUNTER — PATIENT OUTREACH (OUTPATIENT)
Dept: CASE MANAGEMENT | Age: 60
End: 2020-04-21

## 2020-04-21 NOTE — PROGRESS NOTES
Care Manager contacted the patient by telephone in follow up. Verified  and zip code with patient as identifiers. Received notification of ER visit on 20 at [facility] for asthma exacerbation. This is the second ER visit for asthma exacerbation in one month;  Patient's last virtual PCP appt was on 20; Patient has never been followed by Pulmonology for asthma with COPD management;      Assessment of clinical changes and knowledge demonstrated since last call:     Patient reported important numbers to know:  Oxygen level No home Pulse Oximeter   Temperature Reports afebrile   Description of any sputum None, dry cough only;   Pain N/A   Weight 160 lbs         Ongoing Plan of Care:     Impaired Gas Exchange, asthma exacerbation  Goal:  Demonstrates self-management skills to decrease symptoms of asthma, lessen SOB in next 30 days;  · Reported to ER last night with respiratory distress, no relief with home nebulizer use;  · Now past three weeks of SOB with little improvement in symptoms;  · PMH:  COPD, Asthma, HTN, hyperlipidemia, Type 2 Diabetes and CHF; Risk for Infection, steroids - immunosuppression risk for COVID-19  Goal:  Demonstrates behaviors to prevent or lessen risk of infection;  · Tested in ER yesterday for possible COVID;   · Awaiting results;  · Placed on steroids two weeks ago at last ER visit, reports little improvement;  · Anxious, tachypneic;  · CT Chest in ER was negative, except for ground glass appearance; Review and discussion of plan of care with patient, who has provided input to plan, verbalized understanding and agrees with current goals. Any recurrence Red Flags or continued symptoms: Continued hoarseness, sore throat, afebrille;    Medication Regimen Change:  None  Completed a review of medications with patient, who verbalized understanding of how and when to take medications.       Barriers / Adherence with medications:     Unable to  medications - [facility]; Son takes Romain Rudder and is unable to drive;     Upcoming Appointments:    Future Appointments   Date Time Provider Carlo Sullivan   4/22/2020  8:00 AM JAS Roa PFP PFP       Patient asked questions appropriately and denied any additional needs at this time. Patient verbalized understanding of all information discussed. Patient has my name and contact information for any follow up needs or questions. Plan next call:   FU after call with her PCP in AM;   This note will not be viewable in [address]

## 2020-04-21 NOTE — PROGRESS NOTES
Chart opened in error.   Patient on [facility] list for ER visit but patient is being case managed by another CCM at this time  Case closed

## 2020-04-22 LAB — EMERGENT DISEASE PANEL, EDPR: NOT DETECTED

## 2020-04-22 NOTE — PROGRESS NOTES
Called patient to check on her. She is feeling about the same. She was called with negative test results. She had a virtual visit with her primary care physician this morning and they are calling in new medications for her. She was encouraged to return if worsening in any way.

## 2020-04-24 ENCOUNTER — PATIENT OUTREACH (OUTPATIENT)
Dept: OTHER | Age: 60
End: 2020-04-24

## 2020-04-24 NOTE — PROGRESS NOTES
Care Manager contacted the patient by telephone in follow up. Verified  and zip code with patient as identifiers. Received notification of ER visit on 20 at [facility] for asthma exacerbation. This is the second ER visit for asthma exacerbation in past month;       Patient has never been followed by Pulmonology for asthma with COPD management;      Assessment of clinical changes and knowledge demonstrated since last call:   Ongoing Plan of Care:     Impaired Gas Exchange, asthma exacerbation  Goal:  Demonstrates self-management skills to decrease symptoms of asthma, lessen SOB in next 30 days;  · Recent ER visit for respiratory distress, no relief with home nebulizer use;  · Virtual visit on 20 with PCP;  · medications were adjusted;  · Started Singulair;  · PMH:  COPD, Asthma, HTN, hyperlipidemia, Type 2 Diabetes and CHF;  · CT Chest:  Groundglass appearance;     Risk for Infection, steroids - immunosuppression risk for COVID-19  Goal:  Demonstrates behaviors to prevent or lessen risk of infection;  · Tested in ER yesterday for possible COVID;   § Reports they called patient and explained she was negative COVID:  § Reports relief to hear that when she did;  · Remains at higher risk, recent steroids, COPD, asthma;    · Reviewed medical action plan and red flags such as increased shortness of breath, increasing fever and signs of decompensation;  · Patient reports continued SOB with slightest activity;  · \"I tried to clean my floor, but could not for long. \"  · Denies SOB at rest;  · Good working knowledge of use of inhalers and nebulizer based on teach back;   · Denies fever, diarrhea, nausea or vomiting;    · Discussed exposure protocols and quarantine with CDC Guidelines What to do if you are sick with coronavirus disease 2019.   · Continues to monitor for red flags;  § Reviewed number for the [employer] exposure line [phone]    Review and discussion of plan of care with patient, who has provided input to plan, verbalized understanding and agrees with current goals. Barriers / Adherence with medications:  Patient unable to drive;  Depends on [employer] and her son;    Upcoming Appointments:    Future Appointments   Date Time Provider Carlo Sullivan   4/29/2020  8:00 AM JAS Wang PFP PFP       Patient asked questions appropriately and denied any additional needs at this time. Patient verbalized understanding of all information discussed. Patient has my name and contact information for any follow up needs or questions. Plan next call:   FU after next Virtual Visit with PCP on Wednesday; This note will not be viewable in [address]

## 2020-04-30 ENCOUNTER — PATIENT OUTREACH (OUTPATIENT)
Dept: OTHER | Age: 60
End: 2020-04-30

## 2020-04-30 NOTE — PROGRESS NOTES
Incoming call this AM from patient, verified  and Zip code;  Patient expressed concerns regarding   · FU appt with PCP completed yesterday, started some new meds;  · no FU from [facility] on her STD and her PTO has run out; Patient today is quite SOB at rest;  Difficulty speaking full sentences due to SOB; Provided patient assistance to outreach on her behalf as she is having difficulty with speaking and making contact with all concerned;     Virtual office visit with PCP yesterday - new orders for Zpak and prednisone; Able to  at pharmacy today as her son was off work till [employer];  Also [facility] finally as well;  Reports she is going to start them all today; Concerns voiced over her probably rise in blood sugar levels on Prednisone; Instructed patient to keep a log starting now of all her blood sugars and will follow up with her tomorrow - will need to let her PCP know if they spike and possible insulin coverage needed; Patient stated she has difficulty getting forms completed between PCP office and [employer]; This CM able to reach out and speak with PCP office, Emily Burns, who states they have not yet received anything via fax;  Spoke with patient and [employer] via three way call - [employer] able to forward form to Arvin Martinez in [address] who forwarded the needed form to this CM, then to OG in PCP office; Also provided patient with contact information for [employer] application;  Patient is having some financial difficulty with no paycheck; Patient was able to  all prescriptions in afternoon;   Will continue to follow;

## 2020-05-01 ENCOUNTER — PATIENT OUTREACH (OUTPATIENT)
Dept: OTHER | Age: 60
End: 2020-05-01

## 2020-05-01 NOTE — PROGRESS NOTES
Care Manager contacted the patient by telephone in follow up. Verified  and zip code with patient as identifiers. Reports blood sugars a little elevated this AM at 150, since patient started her prednisone yesterday;  Going to continue to monitor her BS levels over this weekend and document her readings; Patient was calling her PCP office following our call to followup on current symptoms and start of diarrhea with antibiotic;   Will continue to follow;

## 2020-05-07 ENCOUNTER — PATIENT OUTREACH (OUTPATIENT)
Dept: OTHER | Age: 60
End: 2020-05-07

## 2020-05-07 NOTE — PROGRESS NOTES
Incoming call from patient, crying upset, at first unable to understand the patient's words;  Calmed her down, able to verify  and Zip code; continued on and off crying, SOB and difficulty with understanding what she was saying; After slowing her breathing, patient read a letter she received, she though from [address] [place] [address] which stated her medical MYESHA was denied; This CM has worked last week with the PCP office to arrange short term disability [employer] paperwork was completed and faxed to [facility] for medical review. Spoke with patient in calm reassuring tones, explained would call back after speaking with PCP office; Patient is not able to speak     Spoke with Vi Schaefer in [facility] diligently completed all the [facility]; However, no forms received for request of clinical information from PCP office regarding HR and MYESHA request;  Will fax to this CM any paperwork with confirmation completed to this point. Outreach to via email to Ness Arreguin, the patient's HR absence team member, to determine if truly this patient received a denial letter and what forms need to be completed by PCP to assist HR in their MYESHA review; On email response, unable to reach Ms Ernie Garza until after may 8 due to payroll activities; Able to connect patient with Urszula Abrams in [address] [place] [address] and coverage through May 22nd explained to patient; No further questions from patient;  Patient reports connecting with her PCP in office this 2 days ago;  States the plan is for Pulmonary and ENT consults to determine treatment plan with continued dyspnea and cough; Patient was calm and able to speak in conversation during this second call and feeling much better;   Will continue to follow;

## 2020-05-13 ENCOUNTER — PATIENT OUTREACH (OUTPATIENT)
Dept: OTHER | Age: 60
End: 2020-05-13

## 2020-05-13 NOTE — PROGRESS NOTES
Incoming call from this patient, verified  and Zip code; States she saw an ENT on consult yesterday;  Reports the specialist feels she is hoarse and has difficulty speaking secondary to both the Nebulizer and inhalers for her asthma, COPD and daily pollen allergens;  Reports he wants patient to see a Speech Therapist when outpatient therapy opens back up after this public health emergency; For now, encouraged patient to use good oral care after every treatment/inhaler use; She agreed; Asked patient about her referral to the Pulmonologist as Dr. Yasmani Simental ordered last week;  Reports she has not received a call from Pulmonology to schedule;   This CM reached out to her PCP office, awaiting response back from [facility];

## 2020-05-18 ENCOUNTER — PATIENT OUTREACH (OUTPATIENT)
Dept: OTHER | Age: 60
End: 2020-05-18

## 2020-05-28 ENCOUNTER — HOSPITAL ENCOUNTER (OUTPATIENT)
Dept: LAB | Age: 60
Discharge: HOME | End: 2020-05-28
Payer: COMMERCIAL

## 2020-05-28 ENCOUNTER — PATIENT OUTREACH (OUTPATIENT)
Dept: OTHER | Age: 60
End: 2020-05-28

## 2020-05-28 DIAGNOSIS — J45.901 SEVERE ASTHMA WITH ACUTE EXACERBATION, UNSPECIFIED WHETHER PERSISTENT: ICD-10-CM

## 2020-05-28 DIAGNOSIS — R06.02 SOB (SHORTNESS OF BREATH): ICD-10-CM

## 2020-05-28 LAB
BASOPHILS # BLD: 0 K/UL (ref 0–0.2)
BASOPHILS NFR BLD: 1 % (ref 0–2)
BNP SERPL-MCNC: 59 PG/ML (ref 5–125)
DIFFERENTIAL METHOD BLD: NORMAL
EOSINOPHIL # BLD: 0.1 K/UL (ref 0–0.8)
EOSINOPHIL NFR BLD: 2 % (ref 0.5–7.8)
ERYTHROCYTE [DISTWIDTH] IN BLOOD BY AUTOMATED COUNT: 13.5 % (ref 11.9–14.6)
HCT VFR BLD AUTO: 39.2 % (ref 35.8–46.3)
HGB BLD-MCNC: 12.8 G/DL (ref 11.7–15.4)
IMM GRANULOCYTES # BLD AUTO: 0 K/UL (ref 0–0.5)
IMM GRANULOCYTES NFR BLD AUTO: 0 % (ref 0–5)
LYMPHOCYTES # BLD: 1.5 K/UL (ref 0.5–4.6)
LYMPHOCYTES NFR BLD: 19 % (ref 13–44)
MCH RBC QN AUTO: 26.6 PG (ref 26.1–32.9)
MCHC RBC AUTO-ENTMCNC: 32.7 G/DL (ref 31.4–35)
MCV RBC AUTO: 81.3 FL (ref 79.6–97.8)
MONOCYTES # BLD: 0.6 K/UL (ref 0.1–1.3)
MONOCYTES NFR BLD: 7 % (ref 4–12)
NEUTS SEG # BLD: 5.6 K/UL (ref 1.7–8.2)
NEUTS SEG NFR BLD: 72 % (ref 43–78)
NRBC # BLD: 0 K/UL (ref 0–0.2)
PLATELET # BLD AUTO: 242 K/UL (ref 150–450)
PMV BLD AUTO: 10.3 FL (ref 9.4–12.3)
RBC # BLD AUTO: 4.82 M/UL (ref 4.05–5.2)
WBC # BLD AUTO: 7.8 K/UL (ref 4.3–11.1)

## 2020-05-28 PROCEDURE — 36415 COLL VENOUS BLD VENIPUNCTURE: CPT

## 2020-05-28 PROCEDURE — 83880 ASSAY OF NATRIURETIC PEPTIDE: CPT

## 2020-05-28 PROCEDURE — 85025 COMPLETE CBC W/AUTO DIFF WBC: CPT

## 2020-05-28 NOTE — PROGRESS NOTES
Care Manager contacted the patient by telephone in follow up. Verified  and zip code with patient as identifiers. 63 yo female who is now 5 weeks s/p return to the ER for worsening CRENSHAW with recurrent asthma exacerbation, ground glass appearance on CT;     Assessment of clinical changes and knowledge demonstrated since last call:   Ongoing Plan of Care:     Impaired Gas Exchange, asthma exacerbation  Goal:  Demonstrates self-management skills to decrease symptoms of asthma, lessen SOB in next 30 days;  · Initial Consult today with Dr. Diaz Mills, [facility];  ? Severe asthma, very limited in ability to walk;  ? Ambulatory Oximetry/Spirometry - shipped to her home- start tomorrow;  ? Restarted Prednisone, tapering dose;  ? Changed to Pulmicort by NEB and given Yuperli samples in office;  ? BNP WNL;    ? Recent ECHO:  Trace pericardial effusion;  · Recent ER visit for respiratory distress, no relief with home nebulizer use;  · Virtual visit on 20 with PCP;  · PMH:  COPD, Asthma, HTN, Diastolic dysfunction, hyperlipidemia, Type 2 Diabetes and CHF;  · CT Chest:  Groundglass appearance;     Risk for Infection, steroids - immunosuppression risk for COVID-19  Goal:  Demonstrates behaviors to prevent or lessen risk of infection;  · Repeat COVID testing ordered today by [facility], Dr Diaz Mills;    · Remains at higher risk, recent steroids, COPD, asthma;    ? Reviewed medical action plan and red flags such as increased shortness of breath, increasing fever and signs of decompensation;  ? Patient reports continued SOB with slightest activity;  ? Good working knowledge of use of inhalers and nebulizer based on teach back;   ?  Most recent HgbA1c 9.1;    § Started on Trulicity to help improve;  § Today fasting ;    § Continues on and off steroids;     Review and discussion of plan of care with patient, who has provided input to plan, verbalized understanding and agrees with current goals.       Barriers / Adherence with medications:  Patient unable to drive;  Depends on [employer] and her son;     Medication Regimen Change:     Started Prednisone taper, Pulmicort by Edwin Tapia, and Yuperli by rosita;   Continues Trulicity; Completed a review of medications with patient, who verbalized understanding of how and when to take medications. Upcoming Appointments: For 24 hrs ambulatory oximetry at home tomorrow; For repeat COVID 19 test;    Out of work until at least June 5th per Pulmonary; Future Appointments   Date Time Provider Carlo Sullivan   5/29/2020  2:35 PM [address] THRU [facility] IMD IMD   6/2/2020  8:30 AM Mitesh Selby NP [facility] PP PP   6/3/2020  6:30 AM [facility] MRI UNIT 1 SFDRMRI SFD   6/3/2020  7:30 AM [facility] US LOGIC 7 UNIT 1 SFDRUS SFD       Patient asked questions appropriately and denied any additional needs at this time. Patient verbalized understanding of all information discussed. Patient has my name and contact information for any follow up needs or questions. Plan next call: Will continue to follow for support and education; This note will not be viewable in [address]

## 2020-06-01 NOTE — H&P (VIEW-ONLY)
[address] [facility], [address] [facility] 
[phone] Patient Name:  Avinash Coburn YOB: 1960 Office Visit 6/2/2020 CHIEF COMPLAINT:   
Chief Complaint Patient presents with  Asthma HISTORY OF PRESENT ILLNESS:    
The patient is a 68-year-old white female who is seen for follow-up of asthma. She has a history of diabetes, hypertension, hypercholesterolemia, depression, and anxiety. She saw Dr. Darcy Calix 5 days ago and was given trial of Yupelri and budesonide. She was also given Prednisone taper. She has a history of asthma and possible COPD since around 2016. She is a never smoker. She was in the ER in March with acute symptoms of cough, wheezing, and worsening shortness of breath. She was back in the ER on 4/20/2020 and COVID-19 testing was negative. CT of chest was negative for PE, but did revealed some mild dependent groundglass opacities. BNP was normal.  She had repeat COVID-19 testing last week, and results are still pending at the time of this dictation. She tells me that she is not any better. She is not any worse though. She denies any fever or chills. No wheezing. She has chronic hoarseness and has been seeing Dr. Roger Shearer. Her chest wall is sore from coughing. She states she cannot sleep due to the cough. Past Medical History:  
Diagnosis Date  Anxiety   
 no meds at present  Asthma   
 prn inhaler  Depression   
 no meds at present  Diabetes mellitus type 2, controlled, without complications ([facility] [address] type 2-- sqbs avg am- ---  no hypo  Dyslipidemia 5/16/2010  Hypertension   
 controlled with med  Migraines  Right knee pain  SVT (supraventricular tachycardia) ([facility])   
 pt states not recent problem-- none x 2 yrs-- no meds-- per pt-- cough would stop it  Vertigo  Vitamin D deficiency   
 resolved--- not a recent issue Problem List  Date Reviewed: 5/12/2020 Codes Class Noted Vertigo ICD-10-CM: Y81 ICD-9-CM: 780.4  7/8/2019 Acute pain of right knee ICD-10-CM: M25.561 ICD-9-CM: 719.46  4/2/2018 Hypotension ICD-10-CM: I95.9 ICD-9-CM: 458.9  8/8/2017 Volume depletion ICD-10-CM: E86.9 ICD-9-CM: 276.50  8/8/2017 Viral gastroenteritis ICD-10-CM: A08.4 ICD-9-CM: 008.8  8/8/2017 Acute renal failure (ARF) (HCC) ICD-10-CM: N17.9 ICD-9-CM: 584.9  8/8/2017 SIRS (systemic inflammatory response syndrome) (HCC) ICD-10-CM: R65.10 ICD-9-CM: 995.90  8/8/2017 Fatigue ICD-10-CM: R53.83 ICD-9-CM: 780.79  6/7/2017 Syncope ICD-10-CM: R55 
ICD-9-CM: 780.2  4/25/2017 Acute midline low back pain without sciatica ICD-10-CM: M54.5 ICD-9-CM: 724.2  4/25/2017 Lipoma of face ICD-10-CM: D17.0 ICD-9-CM: 214.0  12/27/2016 Left foot pain ICD-10-CM: K59.935 ICD-9-CM: 729.5  11/28/2016 Type 2 diabetes mellitus with diabetic neuropathy, without long-term current use of insulin (HCC) ICD-10-CM: E11.40 ICD-9-CM: 250.60, 357.2  9/19/2016 Vitamin D deficiency ICD-10-CM: E55.9 ICD-9-CM: 268.9  Unknown Depression ICD-10-CM: F32.9 ICD-9-CM: 311  Unknown Anxiety ICD-10-CM: F41.9 ICD-9-CM: 300.00  Unknown Hypertension ICD-10-CM: I10 
ICD-9-CM: 401.9  Unknown CAD (coronary artery disease) ICD-10-CM: I25.10 ICD-9-CM: 414.00  Unknown Overview Signed 7/22/2015 10:48 AM by Abdelrahman Dykes  
  SVT Arrhythmia ICD-10-CM: I49.9 ICD-9-CM: 427.9  Unknown Overview Signed 7/22/2015 10:48 AM by Abdelrahman Dykes  
  hx svt SVT (supraventricular tachycardia) (HCC) ICD-10-CM: I47.1 ICD-9-CM: 427.89  3/13/2012 Dyslipidemia (Chronic) ICD-10-CM: M94.5 ICD-9-CM: 272.4  5/16/2010 Asthma (Chronic) ICD-10-CM: J45.909 ICD-9-CM: 493.90  2/4/2010 Past Surgical History:  
Procedure Laterality Date [address] [facility]  HX LAP CHOLECYSTECTOMY  2013  HX MICHI AND BSO  2001 Hysterectomy  HX TONSILLECTOMY  as child  HX TUBAL LIGATION Bilateral   
 1995 No flowsheet data found. Social History Socioeconomic History  Marital status:  Spouse name: Not on file  Number of children: Not on file  Years of education: Not on file  Highest education level: Not on file Occupational History  Not on file Social Needs  Financial resource strain: Not on file  Food insecurity Worry: Not on file Inability: Not on file  Transportation needs Medical: Not on file Non-medical: Not on file Tobacco Use  Smoking status: Never Smoker  Smokeless tobacco: Never Used Substance and Sexual Activity  Alcohol use: No  
 Drug use: No  
 Sexual activity: Not Currently Partners: Male Lifestyle  Physical activity Days per week: Not on file Minutes per session: Not on file  Stress: Not on file Relationships  Social connections Talks on phone: Not on file Gets together: Not on file Attends Nondenominational service: Not on file Active member of club or organization: Not on file Attends meetings of clubs or organizations: Not on file Relationship status: Not on file  Intimate partner violence Fear of current or ex partner: Not on file Emotionally abused: Not on file Physically abused: Not on file Forced sexual activity: Not on file Other Topics Concern  Not on file Social History Narrative  Not on file Family History Problem Relation Age of Onset  Cancer Mother   
     colon  Elevated Lipids Mother  Cancer Father   
     colon  Diabetes Father   
     T2DM  Other Brother   
     sleep apnea  No Known Problems Maternal Grandmother  No Known Problems Maternal Grandfather  No Known Problems Paternal Grandmother  No Known Problems Paternal Grandfather Allergies Allergen Reactions  Codeine Itching  Meperidine Other (comments) Hallucinations Current Outpatient Medications Medication Sig  
 HYDROcodone-homatropine (HYCODAN) 5-1.5 mg/5 mL (5 mL) syrup Take 5 mL by mouth four (4) times daily as needed for Cough for up to 5 days. Max Daily Amount: 20 mL.  lidocaine (XYLOCAINE) 4 % (40 mg/mL) topical solution 2 cc in nebulizer with albuterol qid as needed for coughing spasms.  ondansetron hcl (Zofran) 4 mg tablet Take 4 mg by mouth every eight (8) hours as needed for Nausea or Vomiting.  predniSONE (DELTASONE) 20 mg tablet 2 tabs po qd x 3 days, 1 and 1/2 tabs po qd x 3 days, 1 tab po qd x 3 days, 1/2 tab po qd x 3 days, or as directed.  budesonide (Pulmicort) 0.5 mg/2 mL nbsp 2 mL by Nebulization route two (2) times a day.  albuterol (PROVENTIL VENTOLIN) 2.5 mg /3 mL (0.083 %) nebu 3 mL by Nebulization route four (4) times daily.  montelukast (SINGULAIR) 10 mg tablet TAKE 1 TABLET BY MOUTH EVERY DAY AT NIGHT  Wixela Inhub 500-50 mcg/dose diskus inhaler INHALE 1 PUFF BY MOUTH TWICE A DAY  gabapentin (NEURONTIN) 300 mg capsule TAKE 1 CAP AT BEDTIME X 1 WEEK, INCREASE TO TWICE A DAY X 1 WEEK THEN 1 CAPSULE 3 TIMES A DAY  omeprazole (PRILOSEC) 20 mg capsule TAKE 1 CAPSULE BY MOUTH EVERY DAY  dulaglutide (Trulicity) 1.5 WD/3.5 mL sub-q pen 0.5 mL by SubCUTAneous route every seven (7) days.  NEBULIZER by Does Not Apply route.  albuterol (Ventolin HFA) 90 mcg/actuation inhaler Take 2 Puffs by inhalation every four (4) hours as needed for Wheezing.  albuterol (PROVENTIL VENTOLIN) 2.5 mg /3 mL (0.083 %) nebu 3 mL by Nebulization route every four (4) hours as needed for Wheezing or Shortness of Breath.  amLODIPine (NORVASC) 5 mg tablet Take 1 Tab by mouth daily.  atorvastatin (LIPITOR) 20 mg tablet Take 1 Tab by mouth daily.  losartan-hydroCHLOROthiazide (HYZAAR) 100-25 mg per tablet Take 1 Tab by mouth daily.  potassium chloride SA (MICRO-K) 10 mEq capsule Take 1 Cap by mouth two (2) times a day.  dapagliflozin (FARXIGA) 10 mg tab tablet Take 1 Tab by mouth daily.  meclizine (ANTIVERT) 25 mg tablet Take 1 Tab by mouth three (3) times daily as needed. No current facility-administered medications for this visit. ROS 
 
 
PHYSICAL EXAM: 
 
Vitals:  
 06/02/20 2382 BP: 136/84 Pulse: (!) 110 Resp: 16 Temp: 97.3 °F (36.3 °C) TempSrc: Temporal  
SpO2: 99% Comment: r/a Weight: 173 lb (78.5 kg) Height: 5' 3\" (1.6 m) Body mass index is 30.65 kg/m². GENERAL APPEARANCE: 
 The patient is over weight and in no respiratory distress. HEENT: 
 PERRL. Conjunctivae unremarkable. Nasal mucosa is without epistaxis, exudate, or polyps. Gums and dentition are unremarkable. There is no oropharyngeal narrowing. TMs are clear. NECK/LYMPHATIC: 
 Symmetrical with no elevation of jugular venous pulsation. Trachea midline. No thyroid enlargement. No cervical adenopathy. LUNGS: 
 increased respiratory effort with symmetrical lung expansion. Breath sounds clear. She is coughing throughout most of the visit. HEART: 
 There is a regular rate and rhythm. No murmur, rub, or gallop. There is no edema in the lower extremities. ABDOMEN: 
 Soft and non-tender. No hepatosplenomegaly. Bowel sounds are normal.   
 NEURO: 
 The patient is alert and oriented to person, place, and time. Memory appears intact and mood is normal.  No gross sensorimotor deficits are present. DIAGNOSTIC TESTS: 
 PCXR:  
Results for orders placed during the hospital encounter of 04/20/20 XR CHEST PORT Narrative Clinical History: The patient is a 62years year old Female presenting with 
symptoms of SOB Comparison:  Chest x-ray 3/24/2020 Findings:  Frontal view of the chest was obtained. The lung fields are clear. No pleural effusions are demonstrated. The 
cardiomediastinal silhouette is within normal limits. There are no acute 
osseous abnormalities. Impression Impression: No acute cardiopulmonary process. CPT code(s) 61261 CXR PA and lateral:   
Results for orders placed during the hospital encounter of 04/05/17 XR CHEST PA LAT Narrative EXAM:  XR CHEST PA LAT INDICATION:   sob COMPARISON: 4/24/2013. FINDINGS: PA and lateral radiographs of the chest demonstrate clear lungs. The 
cardiac and mediastinal contours and pulmonary vascularity are normal.  The 
bones and soft tissues are within normal limits. Impression IMPRESSION: Normal chest. 
 
 
 
  
  
 
  
 CT of chest with contrast:   
Results for orders placed during the hospital encounter of 04/20/20 CT CHEST W CONT Narrative Clinical History: The patient is a 62years year old Female presenting with 
symptoms of SOB Technique: Thin section axial images were obtained through the chest with intravenous 
contrast.  Coronal reformatted images were also provided for review. A total of 100 ml of Iopamidol (ISOVUE-370) 76 % contrast was administered 
intravenously. All CT scans at this facility are performed using dose reduction/dose modulation 
techniques, as appropriate the performed exam, including the following: Automated Exposure Control; Adjustment of the mA and/or kV according to patient 
size (this includes techniques or standardized protocols for targeted exams 
where dose is matched to indication/reason for exam); and Use of Iterative Reconstruction Technique. Radiation Exposure Indices: 
Reference Air Kerma (Romana Jews) = 498 mGy-cm Comparison:  Chest CT 10/24/2008. FINDINGS:  
Images through the lungs demonstrate no evidence of pulmonary nodule or mass. No 
effusions are identified. There is mild groundglass infiltrate at the lung 
bases. Normal vascular enhancement is demonstrated. No evidence of aortic dissection or 
aneurysmal dilatation is seen. No evidence of pulmonary embolic disease. There is no significant hilar or mediastinal lymphadenopathy. Images chest wall structures as well as visualized portions of the upper abdomen 
are unremarkable in appearance. There is diffuse fatty liver infiltrate. There are no acute osseous abnormalities. Impression IMPRESSION: 
 
1. No evidence of pulmonary embolic disease. CPT code(s) [id number] ASSESSMENT:  (Medical Decision Making) ICD-10-CM ICD-9-CM 1. Severe asthma with acute exacerbation, unspecified whether persistent J45.901 493.92 HYDROcodone-homatropine (HYCODAN) 5-1.5 mg/5 mL (5 mL) syrup 2. SOB (shortness of breath) R06.02 786.05   
3. Cough R05 786.2 SCHEDULE PROCEDURE HYDROcodone-homatropine (HYCODAN) 5-1.5 mg/5 mL (5 mL) syrup 4. Pulmonary infiltrate R91.8 793.19 SCHEDULE PROCEDURE  
  
 
PLAN: 
Reviewed case with Dr. Swapna Avalos. Follow up on Covid-19 testing. Hycodan 1 teaspoon q 6 hours if needed for severe cough. Add Lidocaine 4% 2 cc to nebulizer qid prn coughing spasms. Advised to avoid eating/drinking for 15 minutes after use. Bronchoscopy for airway inspection due to severe cough that has been present since mid March. Orders Placed This Encounter  SCHEDULE PROCEDURE Bronchoscopy with BAL  
 HYDROcodone-homatropine (HYCODAN) 5-1.5 mg/5 mL (5 mL) syrup Sig: Take 5 mL by mouth four (4) times daily as needed for Cough for up to 5 days. Max Daily Amount: 20 mL. Dispense:  100 mL Refill:  0  
 lidocaine (XYLOCAINE) 4 % (40 mg/mL) topical solution Si cc in nebulizer with albuterol qid as needed for coughing spasms. Dispense:  4 Bottle Refill:  11 Work excuse given. Follow up with us in 3-4 weeks. Collaborating physician is Dr. Cinda Ruiz. Maury Freeman NP Electronically signed Over 50% of today's office visit was spent in face to face time reviewing test results/records, prognosis, importance of compliance, education about disease process, benefits of medications, instructions for management of acute flare-ups, and follow up plans. Total time spent was 56 minutes. Dictated using voice recognition software.   Proof read but unrecognized errors may exist.

## 2020-06-03 ENCOUNTER — HOSPITAL ENCOUNTER (OUTPATIENT)
Dept: ULTRASOUND IMAGING | Age: 60
Discharge: HOME | End: 2020-06-03
Attending: PHYSICIAN ASSISTANT
Payer: COMMERCIAL

## 2020-06-03 ENCOUNTER — HOSPITAL ENCOUNTER (OUTPATIENT)
Dept: MRI IMAGING | Age: 60
Discharge: HOME | End: 2020-06-03
Attending: PHYSICIAN ASSISTANT
Payer: COMMERCIAL

## 2020-06-03 DIAGNOSIS — R41.3 EPISODIC MEMORY LOSS: ICD-10-CM

## 2020-06-03 PROCEDURE — 70551 MRI BRAIN STEM W/O DYE: CPT

## 2020-06-03 PROCEDURE — 93880 EXTRACRANIAL BILAT STUDY: CPT

## 2020-06-10 NOTE — PROGRESS NOTES
Confirmed scheduled procedure with patient on 06/11/20. Informed patient of entry location, time of arrival, and 1 visitor policy. No concerns voiced.

## 2020-06-11 ENCOUNTER — HOSPITAL ENCOUNTER (OUTPATIENT)
Age: 60
Setting detail: OUTPATIENT SURGERY
Discharge: HOME | End: 2020-06-11
Attending: INTERNAL MEDICINE | Admitting: INTERNAL MEDICINE
Payer: COMMERCIAL

## 2020-06-11 ENCOUNTER — PATIENT OUTREACH (OUTPATIENT)
Dept: OTHER | Age: 60
End: 2020-06-11

## 2020-06-11 VITALS
OXYGEN SATURATION: 90 % | WEIGHT: 173 LBS | TEMPERATURE: 98.4 F | RESPIRATION RATE: 14 BRPM | HEART RATE: 109 BPM | HEIGHT: 63 IN | BODY MASS INDEX: 30.65 KG/M2 | DIASTOLIC BLOOD PRESSURE: 84 MMHG | SYSTOLIC BLOOD PRESSURE: 145 MMHG

## 2020-06-11 DIAGNOSIS — R05.3 PERSISTENT COUGH: ICD-10-CM

## 2020-06-11 DIAGNOSIS — J38.3 VOCAL CORD DYSFUNCTION: ICD-10-CM

## 2020-06-11 LAB
BRONCH. LAVAGE DIFF.,BR: NORMAL
EOSINOPHIL NFR BRONCH MANUAL: 0 %
GLUCOSE BLD STRIP.AUTO-MCNC: 200 MG/DL (ref 65–100)
LYMPHOCYTES NFR BRONCH MANUAL: 30 %
MACROPHAGES NFR BRONCH MANUAL: 69 %
NEUTROPHILS NFR BRONCH MANUAL: 1 %

## 2020-06-11 PROCEDURE — 74011250636 HC RX REV CODE- 250/636

## 2020-06-11 PROCEDURE — 87102 FUNGUS ISOLATION CULTURE: CPT

## 2020-06-11 PROCEDURE — 87149 DNA/RNA DIRECT PROBE: CPT

## 2020-06-11 PROCEDURE — 99153 MOD SED SAME PHYS/QHP EA: CPT | Performed by: INTERNAL MEDICINE

## 2020-06-11 PROCEDURE — 87486 CHLMYD PNEUM DNA AMP PROBE: CPT

## 2020-06-11 PROCEDURE — 99152 MOD SED SAME PHYS/QHP 5/>YRS: CPT | Performed by: INTERNAL MEDICINE

## 2020-06-11 PROCEDURE — 77030012699 HC VLV SUC CNTRL OCOA -A: Performed by: INTERNAL MEDICINE

## 2020-06-11 PROCEDURE — 87633 RESP VIRUS 12-25 TARGETS: CPT

## 2020-06-11 PROCEDURE — 83520 IMMUNOASSAY QUANT NOS NONAB: CPT

## 2020-06-11 PROCEDURE — 86356 MONONUCLEAR CELL ANTIGEN: CPT

## 2020-06-11 PROCEDURE — 87541 LEGION PNEUMO DNA AMP PROB: CPT

## 2020-06-11 PROCEDURE — 87070 CULTURE OTHR SPECIMN AEROBIC: CPT

## 2020-06-11 PROCEDURE — 89051 BODY FLUID CELL COUNT: CPT

## 2020-06-11 PROCEDURE — 87186 SC STD MICRODIL/AGAR DIL: CPT

## 2020-06-11 PROCEDURE — 76040000025: Performed by: INTERNAL MEDICINE

## 2020-06-11 PROCEDURE — 74011250636 HC RX REV CODE- 250/636: Performed by: INTERNAL MEDICINE

## 2020-06-11 PROCEDURE — 31624 DX BRONCHOSCOPE/LAVAGE: CPT | Performed by: INTERNAL MEDICINE

## 2020-06-11 PROCEDURE — 82962 GLUCOSE BLOOD TEST: CPT

## 2020-06-11 PROCEDURE — 88112 CYTOPATH CELL ENHANCE TECH: CPT

## 2020-06-11 PROCEDURE — 87116 MYCOBACTERIA CULTURE: CPT

## 2020-06-11 RX ORDER — SODIUM CHLORIDE 0.9 % (FLUSH) 0.9 %
5-40 SYRINGE (ML) INJECTION AS NEEDED
Status: CANCELLED | OUTPATIENT
Start: 2020-06-11

## 2020-06-11 RX ORDER — MIDAZOLAM HYDROCHLORIDE 1 MG/ML
.25-5 INJECTION, SOLUTION INTRAMUSCULAR; INTRAVENOUS
Status: DISCONTINUED | OUTPATIENT
Start: 2020-06-11 | End: 2020-06-11 | Stop reason: HOSPADM

## 2020-06-11 RX ORDER — DIPHENHYDRAMINE HYDROCHLORIDE 50 MG/ML
12.5 INJECTION, SOLUTION INTRAMUSCULAR; INTRAVENOUS
Status: DISCONTINUED | OUTPATIENT
Start: 2020-06-11 | End: 2020-06-11 | Stop reason: HOSPADM

## 2020-06-11 RX ORDER — DIPHENHYDRAMINE HYDROCHLORIDE 50 MG/ML
12.5 INJECTION, SOLUTION INTRAMUSCULAR; INTRAVENOUS
Status: CANCELLED | OUTPATIENT
Start: 2020-06-11

## 2020-06-11 RX ORDER — LIDOCAINE HYDROCHLORIDE 40 MG/ML
SOLUTION TOPICAL AS NEEDED
Status: DISCONTINUED | OUTPATIENT
Start: 2020-06-11 | End: 2020-06-11 | Stop reason: HOSPADM

## 2020-06-11 RX ORDER — SODIUM CHLORIDE 0.9 % (FLUSH) 0.9 %
5-40 SYRINGE (ML) INJECTION AS NEEDED
Status: DISCONTINUED | OUTPATIENT
Start: 2020-06-11 | End: 2020-06-11 | Stop reason: HOSPADM

## 2020-06-11 RX ORDER — LIDOCAINE HYDROCHLORIDE 20 MG/ML
JELLY TOPICAL AS NEEDED
Status: DISCONTINUED | OUTPATIENT
Start: 2020-06-11 | End: 2020-06-11 | Stop reason: HOSPADM

## 2020-06-11 RX ORDER — SODIUM CHLORIDE 0.9 % (FLUSH) 0.9 %
5-40 SYRINGE (ML) INJECTION EVERY 8 HOURS
Status: DISCONTINUED | OUTPATIENT
Start: 2020-06-11 | End: 2020-06-11 | Stop reason: HOSPADM

## 2020-06-11 RX ORDER — FENTANYL CITRATE 50 UG/ML
50 INJECTION, SOLUTION INTRAMUSCULAR; INTRAVENOUS
Status: DISCONTINUED | OUTPATIENT
Start: 2020-06-11 | End: 2020-06-11 | Stop reason: HOSPADM

## 2020-06-11 RX ORDER — SODIUM CHLORIDE 9 MG/ML
25 INJECTION, SOLUTION INTRAVENOUS CONTINUOUS
Status: DISCONTINUED | OUTPATIENT
Start: 2020-06-11 | End: 2020-06-11 | Stop reason: HOSPADM

## 2020-06-11 RX ORDER — MIDAZOLAM HYDROCHLORIDE 1 MG/ML
.25-5 INJECTION, SOLUTION INTRAMUSCULAR; INTRAVENOUS
Status: CANCELLED | OUTPATIENT
Start: 2020-06-11

## 2020-06-11 RX ORDER — LIDOCAINE HYDROCHLORIDE 20 MG/ML
JELLY TOPICAL AS NEEDED
Status: CANCELLED | OUTPATIENT
Start: 2020-06-11

## 2020-06-11 RX ORDER — LIDOCAINE HYDROCHLORIDE 40 MG/ML
SOLUTION TOPICAL AS NEEDED
Status: CANCELLED | OUTPATIENT
Start: 2020-06-11

## 2020-06-11 RX ORDER — SODIUM CHLORIDE 9 MG/ML
25 INJECTION, SOLUTION INTRAVENOUS CONTINUOUS
Status: CANCELLED | OUTPATIENT
Start: 2020-06-11

## 2020-06-11 RX ORDER — SODIUM CHLORIDE 0.9 % (FLUSH) 0.9 %
5-40 SYRINGE (ML) INJECTION EVERY 8 HOURS
Status: CANCELLED | OUTPATIENT
Start: 2020-06-11

## 2020-06-11 RX ADMIN — SODIUM CHLORIDE 25 ML/HR: 900 INJECTION, SOLUTION INTRAVENOUS at 09:59

## 2020-06-11 RX ADMIN — MIDAZOLAM 1 MG: 1 INJECTION INTRAMUSCULAR; INTRAVENOUS at 10:58

## 2020-06-11 RX ADMIN — MIDAZOLAM 2 MG: 1 INJECTION INTRAMUSCULAR; INTRAVENOUS at 10:49

## 2020-06-11 RX ADMIN — FENTANYL CITRATE 50 MCG: 50 INJECTION, SOLUTION INTRAMUSCULAR; INTRAVENOUS at 10:50

## 2020-06-11 RX ADMIN — FENTANYL CITRATE 50 MCG: 50 INJECTION, SOLUTION INTRAMUSCULAR; INTRAVENOUS at 10:54

## 2020-06-11 NOTE — PROGRESS NOTES
[facility]  FU  / Covering for Libby Harmon RN ACM    Patient with persistent respiratory difficulties-  Asthma; COPD; allergies / found with Bronchoscopy today. * CT with mild groundglass infiltrate at the lung  Bases. 2020. * Bronchoscopy resutls/  Clx sent/  Referal for SP - vocal cord retraining planned by Dr. Dolores Quinteros. 3:15pm     CM  follow up call. Contacted  patient for CM follow up services. Verified  and address for HIPAA security. * CM explains she is covering for  Libby Harmon. . saw bronchoscopy this morning and wanted to check on her this afternoon. * Ms. Ellen Hernandez reports that she is sensitive to anesthesia and has been afraid to eat or drink since coming home. - CM can hear hoarseness on the call/ patient reports this is her status quo with her voice, but does note coughing a lot since her bronchoscopy this morning.     - She states that her son was with her for the procedure, but had to go to work this afternoon and will be home at 8:00 pm .   She is currently home alone. - CM asks if there is a friend or neighbor who could help if needed. [place] good at checking in on her. She can 'wave someone down' if needed. * Ms. Ellen Hernandez reports that she sleeps in her recliner and only sleeps 2 hours at a time. This is her normal.    * CM encourages hydration/ shares concerns for dehydration that would impact her cough. * MED CHANGES:  CM reviews DC note:  Hycodan 1 teaspoon q 6 hours if needed for severe cough. Add Lidocaine 4% 2 cc to nebulizer qid prn coughing spasms. Advised to avoid eating/drinking for 15 minutes after use. - Pt states she has cough syrup at home. - Also that she was told to avoid her hand held rescue inhaler and only use nebulizer. * MD APTS:   FU in place. Dr. Sheridan Damico office to call with SP therapy referral.  No referral found in referral tab.      * Patient concerns:   MD reports that she should return to work by 6/20. She is not confident that she will be able to work at that time. * CM concerns/ education:     - CM advises pt to address at her [address]. * Education/ Resources. Med use/ nebulizer use/ Red Flags/    - CM encourages soft foods - scrambled eggs; applesauce; jello; oatmeal, grits, cream of wheat. She has cream of wheat and applesauce at home. Encouraged to sit upright - head up and small bites - to encouraged complete swallow. Deep breathe between bites, but not when food is in her mouth/  Aspiration precautions.    - Education about irritation of the broch tube given - explained difference between irritation cough and swelling that would cause urgent care - breathing problems. Teach back used to confirm her understanding. \"if I feel like I'm breathing through a straw, or food goes into the wrong tube-  I will call 911. \"       CM will call again tomorrow. Chart Review:   PCP OV 6/02    HISTORY OF PRESENT ILLNESS:     The patient is a 20-year-old white female who is seen for follow-up of asthma. She has a history of diabetes, hypertension, hypercholesterolemia, depression, and anxiety. She saw Dr. Myke Marie 5 days ago and was given trial of Yupelri and budesonide. She was also given Prednisone taper.        She has a history of asthma and possible COPD since around 2016. She is a never smoker. She was in the ER in March with acute symptoms of cough, wheezing, and worsening shortness of breath. She was back in the ER on 4/20/2020 and COVID-19 testing was negative. CT of chest was negative for PE, but did revealed some mild dependent groundglass opacities. BNP was normal.  She had repeat COVID-19 testing last week, and results are still pending at the time of this dictation.     She tells me that she is not any better. She is not any worse though. She denies any fever or chills. No wheezing. She has chronic hoarseness and has been seeing Dr. Rojean Felty.   Her chest wall is sore from coughing. She states she cannot sleep due to the cough. Vitals:     06/02/20 0821   BP: 136/84   Pulse: (!) 110   Resp: 16   Temp: 97.3 °F (36.3 °C)   TempSrc: Temporal   SpO2: 99%  Comment: r/a   Weight: 173 lb (78.5 kg)   Height: 5' 3\" (1.6 m)     PLAN:  Reviewed case with Dr. APONTE [facility]. Follow up on Covid-19 testing. Hycodan 1 teaspoon q 6 hours if needed for severe cough. Add Lidocaine 4% 2 cc to nebulizer qid prn coughing spasms. Advised to avoid eating/drinking for 15 minutes after use. Bronchoscopy for airway inspection due to severe cough that has been present since mid March.       Bronchoscopy 6/11 -  DC 11:30    PROCEDURE     Bronchoscopy with airway inspection /BAL     INDICATION   Persistent cough     EQUIPMENT:  Olympus T 180 Bronchoscope    The following samples were obtained:     BAL: RML     Samples were sent for:  Cell count, diff, routine culture, afb, fungus, cytology, CD4:CD8, atypical pna, legionella, HSV1/ pcr, respiratory viral PCR     The procedure was completed without complication and the patient tolerated the procedure well. Recommendations:  Doubt that the patient has any infectious or asthmatic process at this point as she has been tried on all available therapies and not responded. In addition there were no signs of eosinophilia on her last blood work.      Seeing the inappropriate movement of her arytenoids on bronchoscopic exam during attempts at inhalation and no obvious erythema or swelling of the vocal cords themselves today, I suspect she now has vocal cord dysfunction and needs to be evaluated by speech therapy for vocal cord retraining. Will refer her.   Sumi Ornelas MD    1. Do not eat or drink until 1200 . After that, you may have what you please. You may want to try some liquids first, because your throat may be a little sore.   2. Medication may cause drowsiness for several hours, therefore:   Do not drive or operate machinery for remainder of the day.  No alcohol today   Do not make any important or legal decisions for 24 hours   Do not sign any legal documents for 24 hours  3. You may cough up more mucus than usual and you may see some blood, but this is expected and should subside  by the following day. 4. If severe throat irritation, coughing, or bleeding continue, call your doctor. 5. If you run a fever greater than 102, call [facility] at [phone].   6. Dr. Carlyle Gale has asked that you: office will call about information for a speech therapyconsult.

## 2020-06-11 NOTE — PROCEDURES
PROCEDURE    Bronchoscopy with airway inspection /BAL    INDICATION   Persistent cough    EQUIPMENT:  Olympus T 180 Bronchoscope    ANESTHESIA    Concious sedation with: Fentanyl 100 mcg IV; Versed 3mg IV; Lidocaine 200 mg to tracheo-bronchial tree and vocal cords    IMAGING:  CT Chest 4/20/20        AIRWAY INSPECTION    After obtaining informed consent, orally with use of a bite block, an Olympus T 180 Bronchoscope was introduced into the trachea without complication. RIGHT    LOCATION NORM/ABNORM DESCRIPTION   Larynx NL    VOCAL CORDS ABNL Paradoxical motion of the arytenoids which almost completely closed during attempts at inhalation, leading to cough. TRACHEA NL    MAGO NL    RMSB NL    RUL NL    BI NL    RML NL    RLL NL    SUP SEGM RLL NL    MED BASAL NL    ANTERIOR BASAL NL    LATERAL BASAL NL    POSTERIOR BASAL NL               LEFT    LOCATION NORM/ABNORM DESCRIPTION   LMSB NL    SAULO NL    LINGULA NL    LLL NL    SUPERIOR SEG LLL NL    ANIBAL-MEDIAL LLL NL    LATERAL LLL NL    POSTERIOR LLL NL      The following samples were obtained:    BAL: RML    Samples were sent for:  Cell count, diff, routine culture, afb, fungus, cytology, CD4:CD8, atypical pna, legionella, HSV1/ pcr, respiratory viral PCR    The procedure was completed without complication and the patient tolerated the procedure well. EBL: None    Recommendations:  Doubt that the patient has any infectious or asthmatic process at this point as she has been tried on all available therapies and not responded. In addition there were no signs of eosinophilia on her last blood work. Seeing the inappropriate movement of her arytenoids on bronchoscopic exam during attempts at inhalation and no obvious erythema or swelling of the vocal cords themselves today, I suspect she now has vocal cord dysfunction and needs to be evaluated by speech therapy for vocal cord retraining. Will refer her.      Rochelle Deleon MD

## 2020-06-11 NOTE — DISCHARGE INSTRUCTIONS
RESPIRATORY CARE - BRONCHOSCOPY - DISCHARGE INSTRUCTIONS      You received a lot of numbing medication for your throat and nose, and you also received medication to make you sleepy during your procedure. Because of this and because the bronchoscopy may have irritated your airways, we ask that you follow these directions:    1. Do not eat or drink until  1200 . After that, you may have what you please. You may want to try some liquids first, because your throat may be a little sore. 2. Medication may cause drowsiness for several hours, therefore:  · Do not drive or operate machinery for remainder of the day. · No alcohol today  · Do not make any important or legal decisions for 24 hours  · Do not sign any legal documents for 24 hours    3. You may cough up more mucus than usual and you may see some blood, but this is expected and should subside by the following day. 4. If severe throat irritation, coughing, or bleeding continue, call your doctor. 5.         If you run a fever greater than 102, call [facility] at [phone]. 6.         Dr. Jaun Wahl has asked that you: office will call about information for a speech therapyconsult.        Instructions given to Emma Wise and other family members

## 2020-06-11 NOTE — ROUTINE PROCESS
VSS. No complaints noted. Education given and reviewed with son who voiced understanding. Pt wheeled out via wheelchair by Jax Lawson.

## 2020-06-11 NOTE — INTERVAL H&P NOTE
Update History & Physical 
 
The Patient's History and Physical of June 2,  
 was reviewed with the patient and I examined the patient. There was no change. The surgical site was confirmed by the patient and me. Plan:  The risk, benefits, expected outcome, and alternative to the recommended procedure have been discussed with the patient. Patient understands and wants to proceed with the procedure.  
 
Electronically signed by Yessy Herman MD on 6/11/2020 at 10:40 AM

## 2020-06-12 ENCOUNTER — PATIENT OUTREACH (OUTPATIENT)
Dept: OTHER | Age: 60
End: 2020-06-12

## 2020-06-12 RX ORDER — GUAIFENESIN 600 MG/1
600 TABLET, EXTENDED RELEASE ORAL 2 TIMES DAILY
COMMUNITY

## 2020-06-12 NOTE — PROGRESS NOTES
[facility]  FU  / Covering for AMELIA HartM     Patient with persistent respiratory difficulties-  Asthma; COPD; allergies / found with Bronchoscopy today. * CT with mild groundglass infiltrate at the lung  Bases. 2020. *  Bronchoscopy resutls/  Clx sent/  Referal for SP - vocal cord retraining planned by Dr. Veronica Quezada. Contacted  patient for CM follow up services. Verified  and address for HIPAA security. * Ms. Bryan Diaz reports that she had a 'pretty good' night. - She did not take cough syrup due to Lortab in the syrup.     - She does have OTC Mucinex on hand - CM advises to start Mucinex with guaifenesin effect of loosening phlegm. * Expecting call from physician for SP referral today. - Reviewed with patient trauma to vocal cords - needing strengthening and toning like other muscles. SP will provide therapeutic activities - such as humming. *  - attributes to steroids. * Disability - Pa Dennis says she has not gotten any medical records as of Wed.    -   $1500.00 2 wks ago- first paycheck since March. . Waiting to see if she gets a direct deposit today. -  PCP relationship is dalila- morns the loss of her former PCP who has known her over 30 years. - PCP started FMLA - CM advises to return to PCP for extending FMLA and to be cleared for return to work.  also advises that she request a copy of paperwork for her own records. - CM empathizes with patient and encourages FU with practice PM to ensure medical records and FMLA paperwork are submitted. -  encourages pt to make PCP apt for  after Pulmonary apt to FU with medical records and FMLA needs. * MED CHANGES: Addressed in  call. * MD APTS:  Pulmon FU   * Patient Concerns:   More concerned with delay of disability funds and lack of income right now.      - Disability - Pa Dennis says she has not gotten any medical records as of Wed.    -   $1500.00 2 wks ago- first paycheck since March.. Waiting to see if she gets a direct deposit today. * Education/ Resources.     -  guaifenesin effect of loosening phlegm. - Reviewed with patient trauma to vocal cords - needing strengthening and toning like other muscles. SP will provide therapeutic activities - such as humming. Advanced Directive:  Not addressed on this call. Next outreach will be determined by assigned Libby ASHLEY.

## 2020-06-13 LAB
BACTERIA SPEC CULT: NORMAL
GRAM STN SPEC: NORMAL
SERVICE CMNT-IMP: NORMAL

## 2020-06-15 LAB
C. PNEUMONAIE, CPPT: NOT DETECTED
HSV1 DNA # BAL NAA+PROBE: NOT DETECTED COPIES/ML
HSV2 DNA # BAL NAA+PROBE: NOT DETECTED COPIES/ML
L PNEUMO DNA SPEC QL NAA+PROBE: NOT DETECTED
M. PNEUMONIAE, MPPT: NOT DETECTED
PAN LEGIONELLA: NOT DETECTED

## 2020-06-17 LAB
FLUAV RNA SPEC QL NAA+PROBE: NEGATIVE
FLUBV RNA SPEC QL NAA+PROBE: NEGATIVE
HADV DNA SPEC QL NAA+PROBE: NEGATIVE
HMPV RNA SPEC QL NAA+PROBE: NEGATIVE
HPIV1 RNA SPEC QL NAA+PROBE: NEGATIVE
HPIV2 RNA SPEC QL NAA+PROBE: NEGATIVE
HPIV3 RNA SPEC QL NAA+PROBE: NEGATIVE
RHINOVIRUS RNA SPEC QL NAA+PROBE: NEGATIVE
RSV A RNA SPEC QL NAA+PROBE: NEGATIVE
RSV B RNA SPEC QL NAA+PROBE: NEGATIVE
SPECIMEN SOURCE: NORMAL

## 2020-06-19 ENCOUNTER — PATIENT OUTREACH (OUTPATIENT)
Dept: OTHER | Age: 60
End: 2020-06-19

## 2020-06-19 ENCOUNTER — HOSPITAL ENCOUNTER (OUTPATIENT)
Dept: PHYSICAL THERAPY | Age: 60
Discharge: HOME | End: 2020-06-19
Attending: INTERNAL MEDICINE
Payer: COMMERCIAL

## 2020-06-19 DIAGNOSIS — J38.3 VOCAL CORD DYSFUNCTION: ICD-10-CM

## 2020-06-19 PROCEDURE — 92524 BEHAVRAL QUALIT ANALYS VOICE: CPT

## 2020-06-19 NOTE — THERAPY EVALUATION
Sascha Malagon : 1960 Primary: [facility] Medical [facility] Ens* Secondary:  Therapy Center at [address] [place] [address] [address], [address] [facility], [place] [address] [facility] [phone]   [phone] OUTPATIENT SPEECH LANGUAGE PATHOLOGY: Initial Assessment ICD-10: Treatment Diagnosis: dysphonia R 49.0 REFERRING PHYSICIAN: Jonny Werner MD MD Orders: speech evaluate and treat Return Physician Appointment:   
PAST MEDICAL HISTORY:   
Ms. Rojas Tierney is a 62 y.o. female who  has a past medical history of Anxiety, Asthma, Chronic obstructive pulmonary disease ([address] Depression, Diabetes mellitus type 2, controlled, without complications ([address] Dyslipidemia (2010), Hypertension, Migraines, Right knee pain, SVT (supraventricular tachycardia) ([address] Vertigo, and Vitamin D deficiency. She also  has a past surgical history that includes hx tonsillectomy (as child); hx lap cholecystectomy (); hx  section (); hx tubal ligation (Bilateral); and hx kash and bso (). MEDICAL/REFERRING DIAGNOSIS: Vocal cord dysfunction [J38.3] DATE OF ONSET:  or  PRIOR LEVEL OF FUNCTION: residing with her 25year old son PRECAUTIONS/ALLERGIES: Demerol and Codeine ASSESSMENT: 
Ms. Rojas Tierney is a 61 y/o female referred to  due to PVFM. She saw Dr. Nirmala Pagan in May of this year with the following results: \"Reassured regarding no evidence of nodules, polyps or other lesions on the vocal cords. Generalized dullness and edematous cords consistent with the daily use of inhalers and nebulizers. Of note is that when the patient speaks with more muscular relaxation and in a lower register there is much better vocal clarity. This is discussed at length with her along with the option of pursuing video stroboscopy and speech therapy.   Incidentally, she also relates a longstanding history of tinnitus and vertigo which can be evaluated when audiology is available. \"  Notes from Arie Dennis NP from 6/17 revealed the following: \"Due to ongoing cough, she had bronch with BAL on 6/11/20 which revealed paradoxical motion of the arytenoids which almost completed closed during attempts at inhalation, leading to cough. Vocal cord dysfunction is highly suspected and she has been referred to speech therapy. \" Pt reported she was dx with asthma 4-5 years ago and also has COPD. She reported her voice used to be hoarse intermittently when she used her inhalers. However, the hoarseness would subside after 24-48 hours. She reported her voice has been hoarse since March 23rd or 24th and has never subsided. She reported she initially had asthma and started coughing a lot either March 23rd or 24th. She reported she couldn't stop coughing, was SOB and wheezing so she went to the ER. She reported they gave her a breathing treatment and sent her home. She went back to the ER April 20th for the same symptoms. She reported she was tested for COVID and sent home. She reported her COVID test was negative. She reported both times that she went to the ER she tried her inhalers first and they didn't work. She later stated she has been to the ER several times for SOB and she was told it was asthma and COPD. She reported in 2017 \"they did a bronch and flushed a lot of stuff out\". She reported pulmonary told her after the bronch last week that her vocal cords weren't opening up like they should and she should continue to use her inhalers. She reported pulmonary put her on short term disability though July 1st so the pt could get a few sessions of ST in as she does a lot of talking with her job. 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 In regards to coughing triggers, she reported ome smells really bother her a lot such as mold, cigarettes, perfumes and aerosole spray.   She reported she keeps her nebulizer at work just in case she is exposed to an irritant. She reported most of the time her nebulizer helps with the SOB though she may remain SOB for 2-3 hours. She has also tried breathing in through her nose and out through her mouth when SOB which \"sometimes helps\". She reported she gets very SOB now sometimes even just walking to her car. She reported she was walking 4-5 miles a day prior to this year. She reported she stays hydrated as she drinks 2-3 liters of water daily. She drinks a can of Sprite once daily. She avoids citrus and tomato based foods as well as spicy foods due to GERD. She reported she hasn't talked very much since she's been out of work. She reported she tries not to talk on the phone as people cannot hear or understand her. However, she was able to talk on the phone prior to March. She reported she feels like she has to force her speech out and has had to do so since March. She reported some days are better than others in regards to her voice. She reported vocal rest is helpful. She reported she sleeps in a recliner as it helps her breathe better. However, she still stays awake at night due to coughing. She reported she sleeps 2-3 hours on average each night due to coughing. She reported her O2 drops at night to 85 when she can't relax. Based on the objective data described below, the patient presents with a moderate-severe disorder characterized by severe breathiness and hoarseness, severe strain, a restricted vocal range, phonation breaks and a back focus. No episodes of PVFM observed this date. Pt with chronic, severely strained throat clears this date. Pt reported she constantly feels like she has something caught in her throat causing her to clear her throat. She reported she drinks a lot of water and that feels like it lubricates her throat and then she is able to talk a little better.   When asked to produce sound through a kazoo, pt was unable to do so due to severe strain and forcing of air. Recommend ST to address muscle tension dysphonia and PVFM. Will initially focus on MTD as it is felt pt's PVFM will likely improve once pt is able to achieve some relaxation of her larynx. Feel pt's strain may be contributing to the feeling something is caught in her throat. Pt in agreement with recommendations. ?????? ? ? This section established at most recent assessment?????????? 
PROBLEM LIST (Impairments causing functional limitations): 1. MTD (Muscle Tension Dysphonia) 2. PVFM (Paradoxical Vocal Fold Motion) GOALS: (Goals have been discussed and agreed upon with patient.) SHORT-TERM FUNCTIONAL GOALS: Time Frame: 3 months 1. Pt will complete vocal function exercises with adequate vocal quality 80% of the time.   
2. Pt will complete straw phonation tasks with 80% accuracy. 3. Pt will use a forward focus when completing lip trills/vocalizations 80% of the time. 4. Pt will use a forward focus in words/sentences 80% of the time.   
5. Pt will use a forward focus in structured tasks 80% of the time.  
6. Pt will complete diaphragmatic breathing exercises with 80% accuracy.   
7. Pt will use alternatives to throat clears with only min cues needed. 8. Pt will complete sniff/blow technique for PVFM with 80% accuracy. 9. Pt will complete relaxation/visualization techniques for PVFM with 80% accuracy. DISCHARGE GOALS: Time Frame: 4-5 months 1. Pt will use a forward focus when speaking resulting in a clear vocal quality 80% of the time. 2. Pt will independently use compensatory strategies for PVFM. REHABILITATION POTENTIAL FOR STATED GOALS: GoodPLAN OF CARE: 
Patient will benefit from skilled intervention to address the following impairments. INTERVENTIONS PLANNED: (Benefits and precautions of therapy have been discussed with the patient.) COMMUNICATION STRATEGIES:  
 VOCAL HYGIENE RECOMMENDATIONS: 
· Decrease/Eliminate throat clearing TREATMENT PLAN EFFECTIVE DATES: 6/19/2020 TO 9/20/2020 (90 days). FREQUENCY/DURATION: Continue to follow patient 2 times a week for 90 days to address above goals. Regarding Khai Godfrey's therapy, I certify that the treatment plan above will be carried out by a therapist or under their direction. Thank you for this referral, 
[address] [facility], [facility] [address] [address] [facility], [place] Referring Physician Signature: Taj Hopkins MD     Date SUBJECTIVE: 
Pt cooperative. Present Symptoms: dysphonia Current Medications:  
Current Outpatient Medications:  
  revefenacin (Yupelri) 175 mcg/3 mL nebu nebulizer solution, 175 mcg by Nebulization route daily. , Disp: , Rfl:  
  guaiFENesin ER (Mucinex) 600 mg ER tablet, Take 600 mg by mouth two (2) times a day., Disp: , Rfl:  
  lidocaine (XYLOCAINE) 4 % (40 mg/mL) topical solution, 2 cc in nebulizer with albuterol qid as needed for coughing spasms. , Disp: 4 Bottle, Rfl: 11 
  ondansetron hcl (Zofran) 4 mg tablet, Take 4 mg by mouth every eight (8) hours as needed for Nausea or Vomiting., Disp: , Rfl:  
  budesonide (Pulmicort) 0.5 mg/2 mL nbsp, 2 mL by Nebulization route two (2) times a day., Disp: 60 Each, Rfl: 11 
  albuterol (PROVENTIL VENTOLIN) 2.5 mg /3 mL (0.083 %) nebu, 3 mL by Nebulization route four (4) times daily. , Disp: 120 Each, Rfl: 3 
  montelukast (SINGULAIR) 10 mg tablet, TAKE 1 TABLET BY MOUTH EVERY DAY AT NIGHT, Disp: 30 Tab, Rfl: 0 
  gabapentin (NEURONTIN) 300 mg capsule, TAKE 1 CAP AT BEDTIME X 1 WEEK, INCREASE TO TWICE A DAY X 1 WEEK THEN 1 CAPSULE 3 TIMES A DAY, Disp: 90 Cap, Rfl: 0 
  omeprazole (PRILOSEC) 20 mg capsule, TAKE 1 CAPSULE BY MOUTH EVERY DAY, Disp: 30 Cap, Rfl: 0 
  dulaglutide (Trulicity) 1.5 OH/5.0 mL sub-q pen, 0.5 mL by SubCUTAneous route every seven (7) days. , Disp: 4 Syringe, Rfl: 2   NEBULIZER, by Does Not Apply route., Disp: , Rfl:  
  albuterol (Ventolin HFA) 90 mcg/actuation inhaler, Take 2 Puffs by inhalation every four (4) hours as needed for Wheezing., Disp: 1 Inhaler, Rfl: 11 
  amLODIPine (NORVASC) 5 mg tablet, Take 1 Tab by mouth daily. , Disp: 90 Tab, Rfl: 1 
  atorvastatin (LIPITOR) 20 mg tablet, Take 1 Tab by mouth daily. , Disp: 90 Tab, Rfl: 1 
  losartan-hydroCHLOROthiazide (HYZAAR) 100-25 mg per tablet, Take 1 Tab by mouth daily. , Disp: 90 Tab, Rfl: 1 
  potassium chloride SA (MICRO-K) 10 mEq capsule, Take 1 Cap by mouth two (2) times a day., Disp: 180 Cap, Rfl: 1 
  dapagliflozin (FARXIGA) 10 mg tab tablet, Take 1 Tab by mouth daily. , Disp: 90 Tab, Rfl: 1 
  meclizine (ANTIVERT) 25 mg tablet, Take 1 Tab by mouth three (3) times daily as needed. , Disp: 30 Tab, Rfl: 2 Date Last Reviewed: 6/19/2020 History of reflux:  YES    
? 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 
Objective Measure: Tool Used: National Outcomes Measurement System: Functional Communication Measures: VOICE Score:  Initial: 3 Most Recent: X (Date: -- ) Interpretation of Tool: This FCM should not be used for individuals who have had a laryngectomy or tracheotomy, or for individuals with resonance disorders. o Level 1:  The individual is unable to use voice to communicate. Alternative means for communicating are used all of the time. The individual cannot participate in vocational, avocational, and social activities requiring voice. o Level 2:  Voice is not functional for communication most of the time. Alternative means for communicating must be used most of the time. The individuals participation in vocational, avocational, and social activities is significantly limited all of the time.  
o Level 3:  Voice is functional for communication, but is consistently distracting and interferes with communication by drawing attention to itself. Participation in vocational, avocational, and social activities is limited most of the time. o Level 4:  Voice is functional for communication, but sometimes distracting. The individuals ability to participate in vocational, avocational, and social activities requiring voice is occasionally affected in low-vocal demand activities, but consistently affected in high-vocal demand activities. o Level 5:  Voice occasionally sounds normal with self-monitoring, but there is some situational variation. The individuals ability to participate in vocational, avocational, and social activities requiring voice is rarely affected in low-vocal demand activities, but is occasionally affected in high-vocal demand activities. o Level 6:  Voice sounds normal most of the time across all settings and situations. Self-monitoring is consistent when needed. The individuals ability to participate in vocational, avocational, and social activities requiring voice is not affected in low vocal demand activities, but is rarely affected in high-vocal demand activities. o Level 7: The individuals ability to successfully and independently participate in vocational, avocational, and social activities requiring high-or low-vocal demands is not limited by voice. Self-monitoring is effectively used, but only occasionally needed. Score Level 7 Level 6 Level 5 Level 4 Level 3 Level 2 Level 1 Modifier CH CI CJ CK CL CM CN Respiratory Status:      room air Oral Motor Structure/Speech:    
Voice: Voice Evaluation Type of Assessment: Perceptual eval/non-instrumental 
Vocal Onset: Hard Vocal Quality: Hoarse, Strain Vocal Intensity: Too soft Vocal Pitch: Impaired flexibility, Restricted range Resonance: Other (comment)(back focus) Breath Support: Clavicular breathing Vocal Cord Dysfunction: No impairment Vocal Cord Dysfunction: Yes 
 Episode Onset: Abrupt (specify time of day) Episode Relieved By: (relaxing, deep breathing, nebulizers) Episode-Related ER Visits/Hospitalizations: Yes Episode Triggers: Odors Effects of Meds/Inhalers on Episode: (help 50% of the time) Voice Change Following Episode: No(as it stays hoarse) Intonation: (decreased due to severe hoarseness/breathiness) Rate: [facility]) [place]: [facility] Overall Voice Impairment Severity: Moderate-severe S/Z Ratio: 3 seconds for \"s\" with severe strain noted; unable to produce fricative portion of \"z\" Maximum Phonation Time: 3 seconds though severe strain with min voicing produced GRBAS:  
Grade (overall degree of hoarseness): 3 Rough (impression of the irregularity of the vocal fold vibrations): 2 Breathy (impression of air loss through the glottis):3 Aesthenic (weakness or lack of power in the voice): 2 Strained (perceptual impression of vocal hyperfunction): 3 
    TOTAL SCORE (Normal=0): 13 Vocally Abusive Behavior:  
· Excessive throat clearing · Excessive voice use Average Fundamental Frequency: 227 Hz with counting 1-10. Norms: M norm = 100-150 Hz F norm = 180-250 Hz 
  
Conversational speech: 464 Hz Norms: M norm = 100-150 Hz F norm = 180-250 Hz Scale: significantly restricted range Glides: significantly restricted range Assessment only; No treatment(s) provided today__________________________________________________________________________________________________ Treatment Assessment:   . Progression/Medical Necessity:  
Skilled intervention continues to be required due to persistently poor vocal quality, difficulty with vocal fatigue and decreased intelligibility. Compliance with Program/Exercises: Will assess as treatment progresses Reason for Continuation of Services/Other Comments: 
· Patient continues to require skilled intervention due to dysphonia . Recommendations/Intent for next treatment session: \"Treatment next visit will focus on voice tasks\". Total Treatment Duration: 
Time In: 1110 Time Out: [address] CCC-SLP

## 2020-06-19 NOTE — PROGRESS NOTES
Care Manager contacted the patient by telephone in follow up. Verified  and zip code with patient as identifiers. 63 yo female now 8 weeks s/p return to the ER for recurrent asthma exacerbation;    CT Chest:   ground glass appearance;  2020  Outpatient Endoscopy Bronchoalveolar lavage   Lung cultures all negative    Adenoids abnormal motion, almost closing with inhalation causing cough; Referred for vocal cord retraining with Speech Therapy;    PMH:  COPD, Asthma, HTN, Diastolic dysfunction, hyperlipidemia, Type 2 Diabetes and CHF; Assessment of clinical changes and knowledge demonstrated since last call:   Ongoing Plan of Care:     Impaired Gas Exchange, asthma exacerbation  Goal:  Demonstrates self-management skills to lessen SOB in next 30 days;  · Consult, Dr. Mabel Patterson, [facility];  ? Bronchoscopy showed abnormal vocal cord motion - ref to ST;  ? Speech therapy started rikki  ? Throat sore, no fever, discussed relief measures;  ? Initially felt Severe asthma exacerbation;  · Ambulatory Oximetry/Spirometry - shipped to her home- start tomorrow;  · Restarted Prednisone, tapering dose;  · Continues prn Pulmicort and given Yuper[place] samples (Nebulizer therapy); · BNP WNL, continued SOB, mostly hoarseness impacting function;    · Recent ECHO:  Trace pericardial effusion;  · Recent ER visit for respiratory distress, no relief with home nebulizer use;  · Virtual visit on 20 with PCP;     Risk for Infection, steroids - immunosuppression risk for COVID-19  Goal:  Demonstrates behaviors to prevent or lessen risk of infection;  · Remains at higher risk for infection  ?  Immunosuppression on steroids, COPD, asthma;    ? Reviewed medical action plan and red flags;  § Teach back with when to call for support or 911;   · Patient reports continued SOB with slightest activity;  · Good working knowledge of use of inhalers and nebulizer based on teach back;   · Most recent HgbA1c 9.1, infection, steroids;    § Started on Trulicity to help improve;    Review and discussion of plan of care with patient, who has provided input to plan, verbalized understanding and agrees with current goals. Any recurrence Red Flags or continued symptoms: Continues with SOB and hoarseness     Medication Regimen Change:  See above, but no new changes; Completed a review of medications with patient, who verbalized understanding of how and when to take medications. Barriers / Adherence with medications: no difficulty;    Upcoming Appointments:    Future Appointments   Date Time Provider Carlo Sullivan   6/22/2020  8:00 AM Frannie Christiansen, SLP [facility]   6/25/2020  8:00 AM Frannie Christiansen, SLP [facility]   6/29/2020  9:30 AM Frannie Christiansen, SLP [facility]   7/1/2020  9:30 AM Frannie Christiansen, SLP [facility]   8/12/2020  7:50 AM Griffin Coffey NP [facility] PP PP   9/22/2020  9:30 AM Breezy You MD [facility] PFP       Patient asked questions appropriately and denied any additional needs at this time. Patient verbalized understanding of all information discussed. Patient has my name and contact information for any follow up needs or questions. Plan next call:    Support during outpatient ST, review exercises, support with outreach to PCP as needed; This note will not be viewable in [address]

## 2020-06-22 ENCOUNTER — PATIENT OUTREACH (OUTPATIENT)
Dept: OTHER | Age: 60
End: 2020-06-22

## 2020-06-22 ENCOUNTER — HOSPITAL ENCOUNTER (OUTPATIENT)
Dept: PHYSICAL THERAPY | Age: 60
Discharge: HOME | End: 2020-06-22
Attending: INTERNAL MEDICINE
Payer: COMMERCIAL

## 2020-06-22 PROCEDURE — 92507 TX SP LANG VOICE COMM INDIV: CPT

## 2020-06-22 NOTE — PROGRESS NOTES
[facility]   STD and [address] [place] [address] contacted the patient by telephone in follow up. Verified  and zip code with patient as identifiers. Noted increased duration of call, clearer speaking tone, no coughing/SOB during our call;     61 yo female recurrent asthma exacerbation, SOB, coughing, increased physical debility and dysphonia;        PMH:  COPD, Asthma, HTN, Diastolic dysfunction, hyperlipidemia, Type 2 Diabetes and CHF; On 2020  Outpatient Bronch lavage  · Lung cultures all negative         · Adenoids abnormal motion, almost close on inhalation,  cause cough;  · Continues vocal cord retraining with Speech Therapy;  ·  2nd appt today;  · Reports ready to return to work, at least PT;  · Requested letter for RTW, directed to PCP office, my chart;  · Continues to await [facility] completion of medical records - email and VM to [id number] absence team;  · [address] should receive request;  · PCP completed most recent disability paperwork through  date;  · Sent to [facility] as well;     Will continue to follow up in next week to review her progress with ST;

## 2020-06-22 NOTE — PROGRESS NOTES
Emma Wise  :   Primary: Ely Fournier [facility]  Secondary:  Therapy Center at [address] [place] [address] 68, [facility], [address]  [phone]   [phone]        OUTPATIENT SPEECH LANGUAGE PATHOLOGY: Daily Note: 1    ICD-10: Treatment Diagnosis: dysphonia R 49.0   REFERRING PHYSICIAN: Rosalia Ramos MD MD Orders: speech evaluate and treat   Return Physician Appointment:    PAST MEDICAL HISTORY:    Ms. Jose Antonio Cruz is a 62 y.o. female who  has a past medical history of Anxiety, Asthma, Chronic obstructive pulmonary disease ([address] Depression, Diabetes mellitus type 2, controlled, without complications ([address] Dyslipidemia (2010), Hypertension, Migraines, Right knee pain, SVT (supraventricular tachycardia) ([address] Vertigo, and Vitamin D deficiency. She also  has a past surgical history that includes hx tonsillectomy (as child); hx lap cholecystectomy (); hx  section (); hx tubal ligation (Bilateral); and hx kash and bso (). MEDICAL/REFERRING DIAGNOSIS: Vocal cord dysfunction [J38.3]  DATE OF ONSET:  or    PRIOR LEVEL OF FUNCTION: residing with her 25year old son  PRECAUTIONS/ALLERGIES: Demerol and Codeine  ASSESSMENT:  Pt with significant improvement with use of forward focus this date in isolation and syllables as compared to the initial evaluation. Pt was able to produce voicing through a kazoo 100% of the time and was able to vary the pitch. Pt also produced a forward focus in humming upon removal of the kazoo with 90% accuracy. She did well with straw phonation tasks and very well with \"m\" syllables. Encouraged pt to practice straw phonation, lip trills, singing with the kazoo and \"m\" syllables and words for the next session. Pt with significantly less strain and a more appropriate pitch at the end of the session compared to the beginning of the session. ?????? ? ? This section established at most recent assessment??????????  PROBLEM LIST (Impairments causing functional limitations):  1. MTD (Muscle Tension Dysphonia)   2. PVFM (Paradoxical Vocal Fold Motion)  GOALS: (Goals have been discussed and agreed upon with patient.)  SHORT-TERM FUNCTIONAL GOALS: Time Frame: 3 months   1. Pt will complete vocal function exercises with adequate vocal quality 80% of the time.    2. Pt will complete straw phonation tasks with 80% accuracy. 3. Pt will use a forward focus when completing lip trills/vocalizations 80% of the time. 4. Pt will use a forward focus in words/sentences 80% of the time.    5. Pt will use a forward focus in structured tasks 80% of the time.   6. Pt will complete diaphragmatic breathing exercises with 80% accuracy.    7. Pt will use alternatives to throat clears with only min cues needed. 8. Pt will complete sniff/blow technique for PVFM with 80% accuracy. 9. Pt will complete relaxation/visualization techniques for PVFM with 80% accuracy. DISCHARGE GOALS: Time Frame: 4-5 months   1. Pt will use a forward focus when speaking resulting in a clear vocal quality 80% of the time. 2. Pt will independently use compensatory strategies for PVFM. REHABILITATION POTENTIAL FOR STATED GOALS: GoodPLAN OF CARE:  Patient will benefit from skilled intervention to address the following impairments. INTERVENTIONS PLANNED: (Benefits and precautions of therapy have been discussed with the patient.)  COMMUNICATION STRATEGIES:   VOCAL HYGIENE RECOMMENDATIONS:  · Decrease/Eliminate throat clearing  TREATMENT PLAN EFFECTIVE DATES: 6/19/2020 TO 9/20/2020 (90 days). FREQUENCY/DURATION: Continue to follow patient 2 times a week for 90 days to address above goals. Regarding Delores Godfrey's therapy, I certify that the treatment plan above will be carried out by a therapist or under their direction.   Thank you for this referral,  Desiree [address] [address] [facility]                      Referring Physician Signature: Bharat Pro MD     Date      SUBJECTIVE:  Pt cooperative. Present Symptoms: dysphonia      Current Medications:   Current Outpatient Medications:     revefenacin (Yupelri) 175 mcg/3 mL nebu nebulizer solution, 175 mcg by Nebulization route daily. , Disp: , Rfl:     guaiFENesin ER (Mucinex) 600 mg ER tablet, Take 600 mg by mouth two (2) times a day., Disp: , Rfl:     lidocaine (XYLOCAINE) 4 % (40 mg/mL) topical solution, 2 cc in nebulizer with albuterol qid as needed for coughing spasms. , Disp: 4 Bottle, Rfl: 11    ondansetron hcl (Zofran) 4 mg tablet, Take 4 mg by mouth every eight (8) hours as needed for Nausea or Vomiting., Disp: , Rfl:     budesonide (Pulmicort) 0.5 mg/2 mL nbsp, 2 mL by Nebulization route two (2) times a day., Disp: 60 Each, Rfl: 11    albuterol (PROVENTIL VENTOLIN) 2.5 mg /3 mL (0.083 %) nebu, 3 mL by Nebulization route four (4) times daily. , Disp: 120 Each, Rfl: 3    montelukast (SINGULAIR) 10 mg tablet, TAKE 1 TABLET BY MOUTH EVERY DAY AT NIGHT, Disp: 30 Tab, Rfl: 0    gabapentin (NEURONTIN) 300 mg capsule, TAKE 1 CAP AT BEDTIME X 1 WEEK, INCREASE TO TWICE A DAY X 1 WEEK THEN 1 CAPSULE 3 TIMES A DAY, Disp: 90 Cap, Rfl: 0    omeprazole (PRILOSEC) 20 mg capsule, TAKE 1 CAPSULE BY MOUTH EVERY DAY, Disp: 30 Cap, Rfl: 0    dulaglutide (Trulicity) 1.5 QT/8.6 mL sub-q pen, 0.5 mL by SubCUTAneous route every seven (7) days. , Disp: 4 Syringe, Rfl: 2    NEBULIZER, by Does Not Apply route., Disp: , Rfl:     albuterol (Ventolin HFA) 90 mcg/actuation inhaler, Take 2 Puffs by inhalation every four (4) hours as needed for Wheezing., Disp: 1 Inhaler, Rfl: 11    amLODIPine (NORVASC) 5 mg tablet, Take 1 Tab by mouth daily. , Disp: 90 Tab, Rfl: 1    atorvastatin (LIPITOR) 20 mg tablet, Take 1 Tab by mouth daily. , Disp: 90 Tab, Rfl: 1    losartan-hydroCHLOROthiazide (HYZAAR) 100-25 mg per tablet, Take 1 Tab by mouth daily. , Disp: 90 Tab, Rfl: 1    potassium chloride SA (MICRO-K) 10 mEq capsule, Take 1 Cap by mouth two (2) times a day., Disp: 180 Cap, Rfl: 1    dapagliflozin (FARXIGA) 10 mg tab tablet, Take 1 Tab by mouth daily. , Disp: 90 Tab, Rfl: 1    meclizine (ANTIVERT) 25 mg tablet, Take 1 Tab by mouth three (3) times daily as needed. 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 o Level 1:  The individual is unable to use voice to communicate. Alternative means for communicating are used all of the time. The individual cannot participate in vocational, avocational, and social activities requiring voice. o Level 2:  Voice is not functional for communication most of the time. Alternative means for communicating must be used most of the time. The individuals participation in vocational, avocational, and social activities is significantly limited all of the time. o Level 3:  Voice is functional for communication, but is consistently distracting and interferes with communication by drawing attention to itself. Participation in vocational, avocational, and social activities is limited most of the time. o Level 4:  Voice is functional for communication, but sometimes distracting. The individuals ability to participate in vocational, avocational, and social activities requiring voice is occasionally affected in low-vocal demand activities, but consistently affected in high-vocal demand activities.   o Level 5:  Voice occasionally sounds normal with self-monitoring, but there is some situational variation. The individuals ability to participate in vocational, avocational, and social activities requiring voice is rarely affected in low-vocal demand activities, but is occasionally affected in high-vocal demand activities. o Level 6:  Voice sounds normal most of the time across all settings and situations. Self-monitoring is consistent when needed. The individuals ability to participate in vocational, avocational, and social activities requiring voice is not affected in low vocal demand activities, but is rarely affected in high-vocal demand activities. o Level 7: The individuals ability to successfully and independently participate in vocational, avocational, and social activities requiring high-or low-vocal demands is not limited by voice. Self-monitoring is effectively used, but only occasionally needed. Score Level 7 Level 6 Level 5 Level 4 Level 3 Level 2 Level 1   Modifier CH CI CJ CK CL CM CN       Voice tasks:  Lip trills: 4/4; with vocalization: 2/2. Initially duration was 2-3 seconds with vocalization. When cued to relax, pt was able to extend duration to 10-11 seconds. Lip trills with varying pitch: 3/3. Forward focus tasks:   Straw phonation: \"oo\": 50% with forward focus initially. Pitch glides: 50% with forward focus. Accents: 50%. Singing happy birthday: 70%. Using kazoo: 90% with forward focus. With humming once kazoo removed: 90% with forward focus. \"m\" isolation: 50% with forward focus initially; increased to 80% after lip trills. \"may\": 60% with forward focus. \"me\": 90%. \"my\": 100%. \"mo\": 100%. \"mu\": 100%. Laryngeal manipulation:  Reported bilateral soreness. Mod strain and back focus with \"ah\" with decreased ROM        Voice Activities: Activities/Procedures listed utilized to improve progress in vocal quality.  Required moderate cueing to improve functional communication, improve vocal endurance/decrease fatigue and decrease vocal tension/strain.__________________________________________________________________________________________________  Treatment Assessment:   . Progression/Medical Necessity:   Skilled intervention continues to be required due to persistently poor vocal quality, difficulty with vocal fatigue and decreased intelligibility. Compliance with Program/Exercises: Will assess as treatment progresses   Reason for Continuation of Services/Other Comments:  · Patient continues to require skilled intervention due to dysphonia . Recommendations/Intent for next treatment session: \"Treatment next visit will focus on voice tasks\".      Total Treatment Duration:  Time In: 0800  Time Out: 71 JENISE Suarez, CCC-SLP

## 2020-06-25 ENCOUNTER — HOSPITAL ENCOUNTER (OUTPATIENT)
Dept: PHYSICAL THERAPY | Age: 60
Discharge: HOME | End: 2020-06-25
Attending: INTERNAL MEDICINE
Payer: COMMERCIAL

## 2020-06-25 PROCEDURE — 92507 TX SP LANG VOICE COMM INDIV: CPT

## 2020-06-25 NOTE — PROGRESS NOTES
Dimitri Shah  :   Primary: [facility]*  Secondary:  Therapy Center at 4 [facility], [address] [facility], [place], [facility]  [phone]   [phone]        OUTPATIENT SPEECH LANGUAGE PATHOLOGY: Daily Note: 2    ICD-10: Treatment Diagnosis: dysphonia R 49.0   REFERRING PHYSICIAN: Jeremy Anton MD MD Orders: speech evaluate and treat   Return Physician Appointment:    PAST MEDICAL HISTORY:    Ms. Patience Anne is a 62 y.o. female who  has a past medical history of Anxiety, Asthma, Chronic obstructive pulmonary disease ([facility] [address] Depression, Diabetes mellitus type 2, controlled, without complications ([address] Dyslipidemia (2010), Hypertension, Migraines, Right knee pain, SVT (supraventricular tachycardia) ([address] Vertigo, and Vitamin D deficiency. She also  has a past surgical history that includes hx tonsillectomy (as child); hx lap cholecystectomy (); hx  section (); hx tubal ligation (Bilateral); and hx kash and bso (). MEDICAL/REFERRING DIAGNOSIS: Vocal cord dysfunction [J38.3]  DATE OF ONSET:  or    PRIOR LEVEL OF FUNCTION: residing with her 25year old son  PRECAUTIONS/ALLERGIES: Demerol and Codeine  ASSESSMENT:  Pt reported her throat got dry this morning so she drank some water. She reported she got choked on it this morning resulting in a coughing spell. She reported it didn't trigger any asthma or PVFM however. She reported drinking more water resolved the coughing. She reported she has noticed an improvement in her voice as she can talk for longer durations. She reported she met with her boss yesterday as she's supposed to return to work .  She reported she talked to her boss for 45 minutes and she said the pt she sounds a lot better. However, she reported her supervisor noticed her voice was going away the longer she talked.   She reported prior to ST her voice would've been fatigued after 15 minutes so 45 was a big improvement for her. She reported she will go back part time July 1st and she is scheduled to retire at the end of this year. She reported she will get her work schedule next week and can make f/u appointments after that. Pt with significant improvement in vocal quality at the beginning of the session. She did well with straw phonation tasks and vocal function exercises. She also did well with the beginning of resonant voice therapy stage 1. However, when asked to vary volume and rate with \"mamama\" significant back focus was used. Pt then used back focus for the other 2 resonant therapy exercises. Discontinued those tasks and had pt try to use a forward focus with monosyllabic \"m\" words but back focus was still present. Backed down to vowels and then \"m\" isolation. Pt able to use a forward focus with 50% accuracy with \"m\" isolation. Feel pt uses the back focus once her voice becomes fatigued and then it is hard for her to resume using a forward focus. Had her hum into her kazoo to faciliate forward focus with \"m\" words but strain was still observed with the words. Recommend for pt to continue with vocal function exercises, straw phonation tasks and \"m\" syllables and words. Pt continues to exhibit chronic throat clears. She does consume water as needed during the session. Will continue with forward focus tasks as well as introducing strategies for PVFM. ?????? ? ? This section established at most recent assessment??????????  PROBLEM LIST (Impairments causing functional limitations):  1. MTD (Muscle Tension Dysphonia)   2. PVFM (Paradoxical Vocal Fold Motion)  GOALS: (Goals have been discussed and agreed upon with patient.)  SHORT-TERM FUNCTIONAL GOALS: Time Frame: 3 months   1. Pt will complete vocal function exercises with adequate vocal quality 80% of the time.    2. Pt will complete straw phonation tasks with 80% accuracy.   3. Pt will use a forward focus when completing lip trills/vocalizations 80% of the time. 4. Pt will use a forward focus in words/sentences 80% of the time.    5. Pt will use a forward focus in structured tasks 80% of the time.   6. Pt will complete diaphragmatic breathing exercises with 80% accuracy.    7. Pt will use alternatives to throat clears with only min cues needed. 8. Pt will complete sniff/blow technique for PVFM with 80% accuracy. 9. Pt will complete relaxation/visualization techniques for PVFM with 80% accuracy. DISCHARGE GOALS: Time Frame: 4-5 months   1. Pt will use a forward focus when speaking resulting in a clear vocal quality 80% of the time. 2. Pt will independently use compensatory strategies for PVFM. REHABILITATION POTENTIAL FOR STATED GOALS: GoodPLAN OF CARE:  Patient will benefit from skilled intervention to address the following impairments. INTERVENTIONS PLANNED: (Benefits and precautions of therapy have been discussed with the patient.)  COMMUNICATION STRATEGIES:   VOCAL HYGIENE RECOMMENDATIONS:  · Decrease/Eliminate throat clearing  TREATMENT PLAN EFFECTIVE DATES: 6/19/2020 TO 9/20/2020 (90 days). FREQUENCY/DURATION: Continue to follow patient 2 times a week for 90 days to address above goals. Regarding Khai Godfrey's therapy, I certify that the treatment plan above will be carried out by a therapist or under their direction. Thank you for this referral,  Antwan Christina, [facility], [facility], [address] [facility]                      Referring Physician Signature: Taj Hopkins MD     Date      SUBJECTIVE:  Pt cooperative. Present Symptoms: dysphonia      Current Medications:   Current Outpatient Medications:     revefenacin (Yupelri) 175 mcg/3 mL nebu nebulizer solution, 175 mcg by Nebulization route daily. , Disp: , Rfl:     guaiFENesin ER (Mucinex) 600 mg ER tablet, Take 600 mg by mouth two (2) times a day., Disp: , Rfl:     lidocaine (XYLOCAINE) 4 % (40 mg/mL) topical solution, 2 cc in nebulizer with albuterol qid as needed for coughing spasms. , Disp: 4 Bottle, Rfl: 11    ondansetron hcl (Zofran) 4 mg tablet, Take 4 mg by mouth every eight (8) hours as needed for Nausea or Vomiting., Disp: , Rfl:     budesonide (Pulmicort) 0.5 mg/2 mL nbsp, 2 mL by Nebulization route two (2) times a day., Disp: 60 Each, Rfl: 11    albuterol (PROVENTIL VENTOLIN) 2.5 mg /3 mL (0.083 %) nebu, 3 mL by Nebulization route four (4) times daily. , Disp: 120 Each, Rfl: 3    montelukast (SINGULAIR) 10 mg tablet, TAKE 1 TABLET BY MOUTH EVERY DAY AT NIGHT, Disp: 30 Tab, Rfl: 0    gabapentin (NEURONTIN) 300 mg capsule, TAKE 1 CAP AT BEDTIME X 1 WEEK, INCREASE TO TWICE A DAY X 1 WEEK THEN 1 CAPSULE 3 TIMES A DAY, Disp: 90 Cap, Rfl: 0    omeprazole (PRILOSEC) 20 mg capsule, TAKE 1 CAPSULE BY MOUTH EVERY DAY, Disp: 30 Cap, Rfl: 0    dulaglutide (Trulicity) 1.5 UW/2.0 mL sub-q pen, 0.5 mL by SubCUTAneous route every seven (7) days. , Disp: 4 Syringe, Rfl: 2    NEBULIZER, by Does Not Apply route., Disp: , Rfl:     albuterol (Ventolin HFA) 90 mcg/actuation inhaler, Take 2 Puffs by inhalation every four (4) hours as needed for Wheezing., Disp: 1 Inhaler, Rfl: 11    amLODIPine (NORVASC) 5 mg tablet, Take 1 Tab by mouth daily. , Disp: 90 Tab, Rfl: 1    atorvastatin (LIPITOR) 20 mg tablet, Take 1 Tab by mouth daily. , Disp: 90 Tab, Rfl: 1    losartan-hydroCHLOROthiazide (HYZAAR) 100-25 mg per tablet, Take 1 Tab by mouth daily. , Disp: 90 Tab, Rfl: 1    potassium chloride SA (MICRO-K) 10 mEq capsule, Take 1 Cap by mouth two (2) times a day., Disp: 180 Cap, Rfl: 1    dapagliflozin (FARXIGA) 10 mg tab tablet, Take 1 Tab by mouth daily. , Disp: 90 Tab, Rfl: 1    meclizine (ANTIVERT) 25 mg tablet, Take 1 Tab by mouth three (3) times daily as needed. 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 o Level 1:  The individual is unable to use voice to communicate. Alternative means for communicating are used all of the time. The individual cannot participate in vocational, avocational, and social activities requiring voice. o Level 2:  Voice is not functional for communication most of the time. Alternative means for communicating must be used most of the time. The individuals participation in vocational, avocational, and social activities is significantly limited all of the time. o Level 3:  Voice is functional for communication, but is consistently distracting and interferes with communication by drawing attention to itself. Participation in vocational, avocational, and social activities is limited most of the time. o Level 4:  Voice is functional for communication, but sometimes distracting. The individuals ability to participate in vocational, avocational, and social activities requiring voice is occasionally affected in low-vocal demand activities, but consistently affected in high-vocal demand activities. o Level 5:  Voice occasionally sounds normal with self-monitoring, but there is some situational variation. The individuals ability to participate in vocational, avocational, and social activities requiring voice is rarely affected in low-vocal demand activities, but is occasionally affected in high-vocal demand activities. o Level 6:  Voice sounds normal most of the time across all settings and situations. Self-monitoring is consistent when needed.  The individuals ability to participate in vocational, avocational, and social activities requiring voice is not affected in low vocal demand activities, but is rarely affected in high-vocal demand activities. o Level 7: The individuals ability to successfully and independently participate in vocational, avocational, and social activities requiring high-or low-vocal demands is not limited by voice. Self-monitoring is effectively used, but only occasionally needed. Score Level 7 Level 6 Level 5 Level 4 Level 3 Level 2 Level 1   Modifier CH CI CJ CK CL CM CN       Voice tasks:  Lip trills: 4/4; with vocalization: 2/2. Initially duration was 2-3 seconds with vocalization. When cued to relax, pt was able to extend duration to 10-11 seconds. Lip trills with varying pitch: 3/3. Forward focus tasks:   Straw phonation: \"oo\": 90% with forward focus initially. Pitch glides: 80% with forward focus. Back focus with lowest pitch. Accents: 80% with forward focus. Singing happy birthday: 90% with forward focus. Vocal function exercises:  Sustained \"ee\": 11 and 12 seconds with min pitch breaks  Glide from low to high on \"knoll\": 80% with forward focus. Glide from high to low on \"knoll\": 80% with forward focus. \"ol\": 4f 8 sec, 10 sec 4f, 3e 10 sec, 3f, 2f 9 sec, 14 sec 4e, 4e, 13 sec with min pitch breaks      Resonant voice therapy stage 1:  Basic training gesture: 90% with forward focus. \"mama\" with varying slow fast slow: 90%. With soft loud soft: 10% with forward focus initially; 80% with forward focus. Vary rate and volume: 30% with forward focus. Mamama as speech: 40% with forward focus. Chanting with \"m\" sentences: 0% with forward focus. \"m\" single words: 20% with forward focus. \"m\" vowels: 20% with forward focus. \"m\" isolation: 50% with forward focus. Voice Activities: Activities/Procedures listed utilized to improve progress in vocal quality.  Required moderate cueing to improve functional communication, improve vocal endurance/decrease fatigue and decrease vocal tension/strain.__________________________________________________________________________________________________  Treatment Assessment:   . Progression/Medical Necessity:   Skilled intervention continues to be required due to persistently poor vocal quality, difficulty with vocal fatigue and decreased intelligibility. Compliance with Program/Exercises: Will assess as treatment progresses   Reason for Continuation of Services/Other Comments:  · Patient continues to require skilled intervention due to dysphonia . Recommendations/Intent for next treatment session: \"Treatment next visit will focus on voice tasks\".      Total Treatment Duration:  Time In: 0800  Time Out: 408 JENISE Hubbard, CCC-SLP

## 2020-06-29 ENCOUNTER — APPOINTMENT (OUTPATIENT)
Dept: PHYSICAL THERAPY | Age: 60
End: 2020-06-29
Attending: INTERNAL MEDICINE
Payer: COMMERCIAL

## 2020-07-01 ENCOUNTER — PATIENT OUTREACH (OUTPATIENT)
Dept: OTHER | Age: 60
End: 2020-07-01

## 2020-07-01 ENCOUNTER — HOSPITAL ENCOUNTER (OUTPATIENT)
Dept: PHYSICAL THERAPY | Age: 60
Discharge: HOME | End: 2020-07-01
Attending: INTERNAL MEDICINE
Payer: COMMERCIAL

## 2020-07-01 PROCEDURE — 92507 TX SP LANG VOICE COMM INDIV: CPT

## 2020-07-01 NOTE — PROGRESS NOTES
Hernesto Henry  :   Primary: [facility] Ens*  Secondary:  Therapy Center at [facility], [address] [facility], [address]  [phone]   [phone]        OUTPATIENT SPEECH LANGUAGE PATHOLOGY: Daily Note: 3    ICD-10: Treatment Diagnosis: dysphonia R 49.0   REFERRING PHYSICIAN: Sol Maria MD MD Orders: speech evaluate and treat   Return Physician Appointment:    PAST MEDICAL HISTORY:    Ms. Lisa Silva is a 62 y.o. female who  has a past medical history of Anxiety, Asthma, Chronic obstructive pulmonary disease ([facility] [address] Depression, Diabetes mellitus type 2, controlled, without complications ([address] Dyslipidemia (2010), Hypertension, Migraines, Right knee pain, SVT (supraventricular tachycardia) ([address] Vertigo, and Vitamin D deficiency. She also  has a past surgical history that includes hx tonsillectomy (as child); hx lap cholecystectomy (); hx  section (); hx tubal ligation (Bilateral); and hx kash and bso (). MEDICAL/REFERRING DIAGNOSIS: Vocal cord dysfunction [J38.3]  DATE OF ONSET:  or    PRIOR LEVEL OF FUNCTION: residing with her 25year old son  PRECAUTIONS/ALLERGIES: Demerol and Codeine  ASSESSMENT:  Patient reports she is \"having a rough morning\" with her voice. She stated that her voice was \"almost normal\" over the last few days and in fact she was singing along with the radio yesterday and able to hit all pitches, but this morning she has noticed increased raspy quality. Patient observed initially to clear her throat frequently. She reports globus sensation in throat that is usually worse in the morning. Suggested warm water with lemon. Discussed with patient trying to break habit of chronic throat clearing and encouraged her to dry swallow or drink water instead of throat clearing. Patient was observed to catch herself and stop throat clearing during session with minimal cues.  Educated patient on diaphragmatic breathing and provided handouts and demonstration. Patient's vocal quality fluctuated throughout session. She did well with straw phonation tasks and vocal function exercises, especially when incorporating humming or bilabial trills. ?????? ? ? This section established at most recent assessment??????????  PROBLEM LIST (Impairments causing functional limitations):  1. MTD (Muscle Tension Dysphonia)   2. PVFM (Paradoxical Vocal Fold Motion)  GOALS: (Goals have been discussed and agreed upon with patient.)  SHORT-TERM FUNCTIONAL GOALS: Time Frame: 3 months   1. Pt will complete vocal function exercises with adequate vocal quality 80% of the time.    2. Pt will complete straw phonation tasks with 80% accuracy. 3. Pt will use a forward focus when completing lip trills/vocalizations 80% of the time. 4. Pt will use a forward focus in words/sentences 80% of the time.    5. Pt will use a forward focus in structured tasks 80% of the time.   6. Pt will complete diaphragmatic breathing exercises with 80% accuracy.    7. Pt will use alternatives to throat clears with only min cues needed. 8. Pt will complete sniff/blow technique for PVFM with 80% accuracy. 9. Pt will complete relaxation/visualization techniques for PVFM with 80% accuracy. DISCHARGE GOALS: Time Frame: 4-5 months   1. Pt will use a forward focus when speaking resulting in a clear vocal quality 80% of the time. 2. Pt will independently use compensatory strategies for PVFM. REHABILITATION POTENTIAL FOR STATED GOALS: GoodPLAN OF CARE:  Patient will benefit from skilled intervention to address the following impairments. INTERVENTIONS PLANNED: (Benefits and precautions of therapy have been discussed with the patient.)  COMMUNICATION STRATEGIES:   VOCAL HYGIENE RECOMMENDATIONS:  · Decrease/Eliminate throat clearing  TREATMENT PLAN EFFECTIVE DATES: 6/19/2020 TO 9/20/2020 (90 days).   FREQUENCY/DURATION: Continue to follow patient 2 times a week for 90 days to address above goals. Regarding Yesy Godfrey's therapy, I certify that the treatment plan above will be carried out by a therapist or under their direction. Thank you for this referral,  Desiree Reed  [address] [address] [facility]                      Referring Physician Signature: Odilon Abbott MD     Date      SUBJECTIVE:  Pt pleasant and cooperative. Present Symptoms: dysphonia      Current Medications:   Current Outpatient Medications:     revefenacin (Yupelri) 175 mcg/3 mL nebu nebulizer solution, 175 mcg by Nebulization route daily. , Disp: , Rfl:     guaiFENesin ER (Mucinex) 600 mg ER tablet, Take 600 mg by mouth two (2) times a day., Disp: , Rfl:     lidocaine (XYLOCAINE) 4 % (40 mg/mL) topical solution, 2 cc in nebulizer with albuterol qid as needed for coughing spasms. , Disp: 4 Bottle, Rfl: 11    ondansetron hcl (Zofran) 4 mg tablet, Take 4 mg by mouth every eight (8) hours as needed for Nausea or Vomiting., Disp: , Rfl:     budesonide (Pulmicort) 0.5 mg/2 mL nbsp, 2 mL by Nebulization route two (2) times a day., Disp: 60 Each, Rfl: 11    albuterol (PROVENTIL VENTOLIN) 2.5 mg /3 mL (0.083 %) nebu, 3 mL by Nebulization route four (4) times daily. , Disp: 120 Each, Rfl: 3    montelukast (SINGULAIR) 10 mg tablet, TAKE 1 TABLET BY MOUTH EVERY DAY AT NIGHT, Disp: 30 Tab, Rfl: 0    gabapentin (NEURONTIN) 300 mg capsule, TAKE 1 CAP AT BEDTIME X 1 WEEK, INCREASE TO TWICE A DAY X 1 WEEK THEN 1 CAPSULE 3 TIMES A DAY, Disp: 90 Cap, Rfl: 0    omeprazole (PRILOSEC) 20 mg capsule, TAKE 1 CAPSULE BY MOUTH EVERY DAY, Disp: 30 Cap, Rfl: 0    dulaglutide (Trulicity) 1.5 DW/1.2 mL sub-q pen, 0.5 mL by SubCUTAneous route every seven (7) days. , Disp: 4 Syringe, Rfl: 2    NEBULIZER, by Does Not Apply route., Disp: , Rfl:     albuterol (Ventolin HFA) 90 mcg/actuation inhaler, Take 2 Puffs by inhalation every four (4) hours as needed for Wheezing., Disp: 1 Inhaler, Rfl: 11    amLODIPine (NORVASC) 5 mg tablet, Take 1 Tab by mouth daily. , Disp: 90 Tab, Rfl: 1    atorvastatin (LIPITOR) 20 mg tablet, Take 1 Tab by mouth daily. , Disp: 90 Tab, Rfl: 1    losartan-hydroCHLOROthiazide (HYZAAR) 100-25 mg per tablet, Take 1 Tab by mouth daily. , Disp: 90 Tab, Rfl: 1    potassium chloride SA (MICRO-K) 10 mEq capsule, Take 1 Cap by mouth two (2) times a day., Disp: 180 Cap, Rfl: 1    dapagliflozin (FARXIGA) 10 mg tab tablet, Take 1 Tab by mouth daily. , Disp: 90 Tab, Rfl: 1    meclizine (ANTIVERT) 25 mg tablet, Take 1 Tab by mouth three (3) times daily as needed. 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 o Level 1:  The individual is unable to use voice to communicate. Alternative means for communicating are used all of the time. The individual cannot participate in vocational, avocational, and social activities requiring voice. o Level 2:  Voice is not functional for communication most of the time. Alternative means for communicating must be used most of the time. The individuals participation in vocational, avocational, and social activities is significantly limited all of the time. o Level 3:  Voice is functional for communication, but is consistently distracting and interferes with communication by drawing attention to itself. Participation in vocational, avocational, and social activities is limited most of the time.   o Level 4:  Voice is functional for communication, but sometimes distracting. The individuals ability to participate in vocational, avocational, and social activities requiring voice is occasionally affected in low-vocal demand activities, but consistently affected in high-vocal demand activities. o Level 5:  Voice occasionally sounds normal with self-monitoring, but there is some situational variation. The individuals ability to participate in vocational, avocational, and social activities requiring voice is rarely affected in low-vocal demand activities, but is occasionally affected in high-vocal demand activities. o Level 6:  Voice sounds normal most of the time across all settings and situations. Self-monitoring is consistent when needed. The individuals ability to participate in vocational, avocational, and social activities requiring voice is not affected in low vocal demand activities, but is rarely affected in high-vocal demand activities. o Level 7: The individuals ability to successfully and independently participate in vocational, avocational, and social activities requiring high-or low-vocal demands is not limited by voice. Self-monitoring is effectively used, but only occasionally needed. Score Level 7 Level 6 Level 5 Level 4 Level 3 Level 2 Level 1   Modifier CH CI CJ CK CL CM CN       Voice tasks:  Lip trills: 4/4; with vocalization: 4/4. Duration was 5-7 seconds with min cues to relax. Lip trills with varying pitch: 4/4. Forward focus tasks:   Straw phonation: \"oo\": 100% with forward focus  Pitch glides: 75% with forward focus. Vocal function exercises:  Sustained \"ee\": 8 seconds with min-mod pitch breaks, improving to 11 seconds with using /m/ to initiate (/me/)  Glide from low to high on \"knoll\": patient having increased difficulty with this task today with frequent pitch breaks    Voice Activities: Activities/Procedures listed utilized to improve progress in vocal quality.  Required moderate cueing to improve functional communication, improve vocal endurance/decrease fatigue and decrease vocal tension/strain.__________________________________________________________________________________________________  Treatment Assessment:   . Progression/Medical Necessity:   Skilled intervention continues to be required due to persistently poor vocal quality, difficulty with vocal fatigue and decreased intelligibility. Compliance with Program/Exercises: Will assess as treatment progresses   Reason for Continuation of Services/Other Comments:  · Patient continues to require skilled intervention due to dysphonia . Recommendations/Intent for next treatment session: \"Treatment next visit will focus on voice tasks\".      Total Treatment Duration:  Time In: 0930  Time Out: Kavita Brower, CCC-SLP

## 2020-07-01 NOTE — PROGRESS NOTES
Care Manager contacted the patient by telephone in follow up. Verified  and zip code with patient as identifiers. 61 yo female recurrent asthma exacerbation, SOB, coughing, increased physical debility and dysphonia; Assessment of clinical changes and knowledge demonstrated since last call:   Ongoing Plan of Care:     PMH:  COPD, Asthma, HTN, Diastolic dysfunction, hyperlipidemia, Type 2 Diabetes and CHF;      2020 Bronch lavage  · Lung cultures all negative         · Adenoids abnormal motion, almost close on inhalation,  cause cough;  · Continues vocal cord retraining with Speech Therapy;  · 4th FU Speech Therapy sessioncompleted today;  · \"I was singing a little yesterday\"  · \"A little more hoarse today\"  · Progress being made weekly with ST;  ? Reports ready to return to work, at least PT;  ? Requested letter for RTW, directed to PCP office, my chart;  ? Continues to await [facility] completion of medical records   [address] absence team;  § Had spoken with patient and explained payroll checks going to benefits $;  Ilsa Orellana has received all medical records;  § Patient returned back to work PT today;    ? Also mentioned Outpatient Pulmonary Rehab as a future possibility if her pulmonary physicians agree;  § Patient interested;  § Enough with outpatient ST twice weekly and returning to work PT for now;      Review and discussion of plan of care with patient, who has provided input to plan, verbalized understanding and agrees with current goals. Any recurrence Red Flags or continued symptoms: Some hoarseness, SOB much improved;    Medication Regimen Change:  No new changes; Completed a review of medications with patient, who verbalized understanding of how and when to take medications.       Barriers / Adherence with medications:  none    Upcoming Appointments:    Future Appointments   Date Time Provider Carlo Sullivan   2020  7:50 AM Maylin Zmabrano NP [facility] PP PP 9/22/2020  9:30 AM Natalee You MD [facility] PFP PFP       Patient asked questions appropriately and denied any additional needs at this time. Patient verbalized understanding of all information discussed. Patient has my name and contact information for any follow up needs or questions. Plan next call: Will continue to provide educational support and identify appropriate resources to support continued improvement; This note will not be viewable in [address]

## 2020-07-02 LAB
CD3 CELLS # SPEC: 87 %
CD3+CD4+ CELLS # BLD: 74 %
CD4:CD8 RATIO, C48RBT: 7.4 RATIO
CD8, CD8BT: 10 %
SPECIMEN SOURCE: NORMAL

## 2020-07-09 ENCOUNTER — PATIENT OUTREACH (OUTPATIENT)
Dept: OTHER | Age: 60
End: 2020-07-09

## 2020-07-09 LAB
FUNGUS CULTURE, RFCO2T: NEGATIVE
FUNGUS SMEAR, RFCO1T: NORMAL
FUNGUS SPEC CULT: NORMAL
FUNGUS STAIN, 188244: NORMAL
REFLEX TO ID, RFCO3T: NORMAL
SPECIMEN SOURCE: NORMAL
SPECIMEN SOURCE: NORMAL

## 2020-07-22 LAB
ACID FAST STN SPEC: NEGATIVE
AMIKACIN ISLT MIC: ABNORMAL
CLARITHRO ISLT MIC: ABNORMAL
LINEZOLID ISLT MIC: ABNORMAL
M AVIUM CMPLX RRNA SPEC QL PROBE: POSITIVE
M GORDONAE RRNA SPEC QL PROBE: ABNORMAL
M KANSASII RRNA SPEC QL PROBE: ABNORMAL
M TB CMPLX RRNA SPEC QL PROBE: NEGATIVE
MICROORGANISM/AGENT SPEC: ABNORMAL
MOXIFLOXACIN ISLT MIC: ABNORMAL
MYCOBACTERIUM SPEC QL CULT: POSITIVE
OTHER, RAFBI6: ABNORMAL
PLEASE NOTE, AFR16: ABNORMAL
SPECIMEN PREPARATION: ABNORMAL
SPECIMEN SOURCE: ABNORMAL
STREPTOMYCIN ISLT MIC: ABNORMAL
SUSCEPT TESTING, RAFBI7: ABNORMAL

## 2020-07-23 NOTE — PROGRESS NOTES
This is your patient that I saw in follow up of cough. Due to severity of cough, bronch was performed, results + for MAC. Also noted VCD and she is seeing speech therapy. CT with mild ground glass infiltrate. I'm hesitant to start 3 drug therapy on her due to lack of significant CT findings. Thoughts?

## 2020-07-29 ENCOUNTER — PATIENT OUTREACH (OUTPATIENT)
Dept: OTHER | Age: 60
End: 2020-07-29

## 2020-07-31 NOTE — PROGRESS NOTES
Speech Pathology  Called pt this date to f/u as SLP was told pt would call back to schedule more appointments after she returned to work. However, pt has not called back. Left a message this date (7/31) to determine if she plans to resume ST.     [facility]., CCC-SLP

## 2020-08-12 PROBLEM — N17.9 ACUTE RENAL FAILURE (ARF) (HCC): Status: RESOLVED | Noted: 2017-08-08 | Resolved: 2020-08-12

## 2020-09-02 ENCOUNTER — PATIENT OUTREACH (OUTPATIENT)
Dept: OTHER | Age: 60
End: 2020-09-02

## 2020-09-02 NOTE — PROGRESS NOTES
Care Manager contacted the patient by telephone in follow up. Verified  and zip code with patient as identifiers. 61 yo female recurrent asthma exacerbation, SOB, coughing, increased physical debility and dysphonia;     PMH:  COPD, Asthma, HTN, Diastolic dysfunction, hyperlipidemia, Type 2 Diabetes and CHF; Assessment of clinical changes and knowledge demonstrated since last call:   Ongoing Plan of Care: Activity Intolerance  Goal:   Demonstrates improved endurance in activity with self-management of SOB;  · Patient has been able to return to work, usually 4-6 hr shifts;  § Reports mild SOB from car to job site;   Jalousier with some SOB with exertion;   § Reports had to work a 12 hr shift once, was exhausted;  · Has begun daily rest breaks, resting a day in between work as well;   · Performing energy saving strategies to save ebergy for when needed;  · Speech better    Disturbed Sensory Perception, peripheral neuropathy, balance disorder;  New Goal:   Demonstrates safe compensation and prevention behaviors to prevent  injury;   · Reports Neurologist stated her Neuropathy has traveled further up in her legs   · He was pleased she was retired now;  · Marcelle starting Oct 1st;   · Good 401k savings to live one now;  · Sherley YOST a few times recently;  · Sons bought and installed rails in [place] and tub;  · \"I tend to lose balance run into a wall\"  · Using [address] outside house;  · Last Neuro OV, he Increased gabapentin;  · However makes her too sleepy;  · Taking Gabapentin 2 in AM and 2 in PM - better she states;  · Gets out for walks at night with sons and takes her cane; Review and discussion of plan of care with patient, who has provided input to plan, verbalized understanding and agrees with current goals. Medication Regimen Change:  Increased dosage of Gabapentin;  Completed a review of medications with patient, who verbalized understanding of how and when to take medications. Barriers / Adherence with medications:  Some sleepiness with increased Gabapentin and when it is taken;     Upcoming Appointments:    Future Appointments   Date Time Provider Carlo Lozanoisti   9/22/2020  9:30 AM Kathi You MD [facility] PFP PFP   11/30/2020  9:30 AM Dorothea Montejo DO [facility] BSNE       Patient asked questions appropriately and denied any additional needs at this time. Patient verbalized understanding of all information discussed. Patient has my name and contact information for any follow up needs or questions. Plan next call:   FU in three weeks after her PCP OV; This note will not be viewable in [address]

## 2020-10-12 ENCOUNTER — PATIENT OUTREACH (OUTPATIENT)
Dept: OTHER | Age: 60
End: 2020-10-12

## 2020-10-12 NOTE — PROGRESS NOTES
Care Manager contacted the patient by telephone in follow up. Verified  and zip code with patient as identifiers. 63 yo female recurrent asthma exacerbation, SOB, coughing, increased physical debility and dysphonia;   Had planned to return to work, but chose to retire instead;  Resolving current episode of case management. Patient has met patient stated and/or medical goals. No longer eligible on the [employer] health plan;    Goals Met with Plan of Care:   Demonstrates improved endurance in activity with self-management of SOB; Demonstrates safe compensation and prevention behaviors to prevent  injury;     Patient consistenly demonstrates understanding of the medical action plan through execution of plan. Appointments with key providers are scheduled and attended. Plan of care modified and updated to address new challenges and barriers with minimal support from the CM team (proactively uses physicians and community resources). The support system remains current and has been modified as needed. Patient continues to acquire needed resources from the current support system established. Teach back demonstrates that patient understands education for self management of chronic conditions. Patient consistenly communicates understanding of signs,symptoms and complications for all major diagnoses. Patient modifies his/her lifestyle toreduce or avoid risk factors to his/her health. ECM will follow as needed and patient has ECM contact information for future needs. This note will not be viewable in [address]

## 2020-11-05 NOTE — THERAPY DISCHARGE
Patricio Catherine  :   Primary: [facility]  Secondary:  [facility]  [address], [address] [place] [address], [address]  [phone]   [phone]        OUTPATIENT SPEECH LANGUAGE PATHOLOGY: Discontinuation Summary    ICD-10: Treatment Diagnosis: dysphonia R 49.0   REFERRING PHYSICIAN: Jeffery Kilgore MD MD Orders: speech evaluate and treat   Return Physician Appointment:    PAST MEDICAL HISTORY:    Ms. Bari Herrera is a 62 y.o. female who  has a past medical history of Anxiety, Asthma, Chronic obstructive pulmonary disease ([facility] [address] Depression, Diabetes mellitus type 2, controlled, without complications ([address] Dyslipidemia (2010), Hypertension, Migraines, Right knee pain, SVT (supraventricular tachycardia) ([address] Vertigo, and Vitamin D deficiency. She also  has a past surgical history that includes hx tonsillectomy (as child); hx lap cholecystectomy (); hx  section (); hx tubal ligation (Bilateral); and hx kash and bso (). MEDICAL/REFERRING DIAGNOSIS: Vocal cord dysfunction [J38.3]  DATE OF ONSET:  or    PRIOR LEVEL OF FUNCTION: residing with her 25year old son  PRECAUTIONS/ALLERGIES: Demerol and Codeine  ASSESSMENT:  Patient attended 3 sessions due to dysphonia from -. She reported at her last session  she would call back to schedule more appointments. However, she did not call back. Called pt  to f/u. She was unavailable and did not return the phone call. Therefore, will DC at this time. ?????? ? ? This section established at most recent assessment??????????  PROBLEM LIST (Impairments causing functional limitations):  1. MTD (Muscle Tension Dysphonia)   2. PVFM (Paradoxical Vocal Fold Motion)  GOALS: (  SHORT-TERM FUNCTIONAL GOALS:   1. Pt will complete vocal function exercises with adequate vocal quality 80% of the time.  Goal not met.   2. Pt will complete straw phonation tasks with 80% accuracy. Goal not met. 3. Pt will use a forward focus when completing lip trills/vocalizations 80% of the time. Goal met. 4. Pt will use a forward focus in words/sentences 80% of the time.  Goal not met. 5. Pt will use a forward focus in structured tasks 80% of the time.  Goal not met. 6. Pt will complete diaphragmatic breathing exercises with 80% accuracy.  Goal not targeted. 7. Pt will use alternatives to throat clears with only min cues needed. Goal not re-assessed. 8. Pt will complete sniff/blow technique for PVFM with 80% accuracy. Goal not targeted. 9. Pt will complete relaxation/visualization techniques for PVFM with 80% accuracy. Goal not targeted. DISCHARGE GOALS:   1. Pt will use a forward focus when speaking resulting in a clear vocal quality 80% of the time. Goal not re-assessed. 2. Pt will independently use compensatory strategies for PVFM. Goal not re-assessed. PLAN OF CARE:  Discharge from [address] [place] [address] Thank you for this referral,  JENISE Reed, CCC-SLP        SUBJECTIVE:  Pt pleasant and cooperative in therapy. OBJECTIVE:  Objective Measure: Tool Used: National Outcomes Measurement System: Functional Communication Measures: VOICE  Score:  Initial: 3 Most Recent: X (Date: -- )   Interpretation of Tool: This FCM should not be used for individuals who have had a laryngectomy or tracheotomy, or for individuals with resonance disorders. o Level 1:  The individual is unable to use voice to communicate. Alternative means for communicating are used all of the time. The individual cannot participate in vocational, avocational, and social activities requiring voice. o Level 2:  Voice is not functional for communication most of the time. Alternative means for communicating must be used most of the time. The individuals participation in vocational, avocational, and social activities is significantly limited all of the time.   o Level 3:  Voice is functional for communication, but is consistently distracting and interferes with communication by drawing attention to itself. Participation in vocational, avocational, and social activities is limited most of the time. o Level 4:  Voice is functional for communication, but sometimes distracting. The individuals ability to participate in vocational, avocational, and social activities requiring voice is occasionally affected in low-vocal demand activities, but consistently affected in high-vocal demand activities. o Level 5:  Voice occasionally sounds normal with self-monitoring, but there is some situational variation. The individuals ability to participate in vocational, avocational, and social activities requiring voice is rarely affected in low-vocal demand activities, but is occasionally affected in high-vocal demand activities. o Level 6:  Voice sounds normal most of the time across all settings and situations. Self-monitoring is consistent when needed. The individuals ability to participate in vocational, avocational, and social activities requiring voice is not affected in low vocal demand activities, but is rarely affected in high-vocal demand activities. o Level 7: The individuals ability to successfully and independently participate in vocational, avocational, and social activities requiring high-or low-vocal demands is not limited by voice. Self-monitoring is effectively used, but only occasionally needed.   Score Level 7 Level 6 Level 5 Level 4 Level 3 Level 2 Level 1   Modifier CH CI CJ CK CL CM CN       JENISE Fontana, CCC-SLP

## 2021-01-15 NOTE — CONSULTS
Consult    Patient: Rosetta Aschoff MRN: [id number]     YOB: 1960  Age: 59 y.o. Sex: female      Subjective:      Rosetta Aschoff is a 59 y.o. female who is being seen for vertigo. The patient was at work in the ED last night, she suddenly became vertiginous and was unable to stand up. She endorses associated nausea and vomiting. She reports long-standing history of vertigo with frequent attacks. MRI of brain was negative.      Past Medical History:   Diagnosis Date    Anxiety     no meds at present    Asthma     prn inhaler    Depression     no meds at present    Diabetes mellitus type 2, controlled, without complications ([facility] [address]     type 2-- sqbs avg am- ----  no hypo    Dyslipidemia 2010    Hypertension     controlled with med    Migraines     Right knee pain     SVT (supraventricular tachycardia) (HCC)     pt states not recent problem-- none x 2 yrs-- no meds-- per pt-- cough would stop it    Vertigo     Vitamin D deficiency     resolved--- not a recent issue     Past Surgical History:   Procedure Laterality Date    HX  SECTION      HX LAP CHOLECYSTECTOMY  2013    HX MICHI AND BSO  2001    Hysterectomy    HX TONSILLECTOMY  as child    HX TUBAL LIGATION Bilateral           Family History   Problem Relation Age of Onset    Cancer Mother         colon    Elevated Lipids Mother     Cancer Father         colon    Diabetes Father         T2DM    Other Brother         sleep apnea     Social History     Tobacco Use    Smoking status: Never Smoker    Smokeless tobacco: Never Used   Substance Use Topics    Alcohol use: No      Current Facility-Administered Medications   Medication Dose Route Frequency Provider Last Rate Last Dose    0.9% sodium chloride infusion  125 mL/hr IntraVENous CONTINUOUS Lisa Boyle MD   Stopped at 19 1304    dextrose 5% lactated ringers infusion  125 mL/hr IntraVENous CONTINUOUS Gallo Reynoso MD MATT 125 mL/hr at 07/08/19 1304 125 mL/hr at 07/08/19 1304    And    potassium chloride 20 mEq in 100 ml IVPB  20 mEq IntraVENous Q2H Domingo Caldera MD 50 mL/hr at 07/08/19 1304 20 mEq at 07/08/19 1304     Current Outpatient Medications   Medication Sig Dispense Refill    amLODIPine (NORVASC) 5 mg tablet Take 1 Tab by mouth daily. 90 Tab 1    atorvastatin (LIPITOR) 20 mg tablet Take 1 Tab by mouth daily. 90 Tab 1    losartan-hydroCHLOROthiazide (HYZAAR) 100-25 mg per tablet Take 1 Tab by mouth daily. 90 Tab 1    potassium chloride SA (MICRO-K) 10 mEq capsule Take 1 Cap by mouth two (2) times a day. 180 Cap 1    SITagliptin (JANUVIA) 100 mg tablet Take 1 Tab by mouth daily. 90 Tab 1    dapagliflozin (FARXIGA) 10 mg tab tablet Take 1 Tab by mouth daily. 90 Tab 1    fluticasone-salmeterol (ADVAIR DISKUS) 250-50 mcg/dose diskus inhaler Take 1 Puff by inhalation every twelve (12) hours. (Patient taking differently: Take 1 Puff by inhalation two (2) times daily as needed.) 1 Inhaler 12    albuterol (VENTOLIN HFA) 90 mcg/actuation inhaler Take 2 Puffs by inhalation every four (4) hours as needed for Wheezing. 1 Inhaler 11    omega-3 acid ethyl esters (LOVAZA) 1 gram capsule Take 2 Caps by mouth two (2) times a day. 120 Cap 5    meclizine (ANTIVERT) 25 mg tablet Take 1 Tab by mouth three (3) times daily as needed. 30 Tab 2    ondansetron (ZOFRAN ODT) 4 mg disintegrating tablet Take 1 tablet by mouth every eight (8) hours as needed. 20 tablet 1        Allergies   Allergen Reactions    Codeine Itching    Meperidine Other (comments)     Hallucinations       Review of Systems:  CONSTITUTIONAL:  Denies weight loss, fever, chills, weakness or fatigue. HEENT:  Eyes:  Endorses tinnitus in bilateral ears and aural fullness. Denies visual loss, blurred vision, double vision or yellow sclerae. Ears, Nose, Throat: Denies sneezing, congestion, runny nose or sore throat.   SKIN:  Denies rash or itching. CARDIOVASCULAR:  Denies chest pain, chest pressure or chest discomfort. No palpitations or edema. RESPIRATORY:  Denies shortness of breath, cough or sputum. GASTROINTESTINAL:  Endorses nausea and vomiting. Denies anorexia,  or diarrhea. No abdominal pain or blood. GENITOURINARY:  Denies burning with urination. NEUROLOGICAL:  Endorses dizziness, vertigo. Denies headache, syncope, paralysis, ataxia, numbness or tingling in the extremities. Denies change in bowel or bladder control. MUSCULOSKELETAL:  Denies muscle, back pain, joint pain or stiffness. HEMATOLOGIC:  Denies anemia, bleeding or bruising. LYMPHATICS:  Denies enlarged nodes. PSYCHIATRIC: Denies history depression or anxiety. ENDOCRINOLOGIC:  Denies reports of sweating, cold or heat intolerance. No polyuria or polydipsia. ALLERGIES:  Denies history of asthma, hives, eczema or rhinitis. Objective:     Vitals:    07/08/19 0844 07/08/19 0913 07/08/19 0944 07/08/19 1150   BP: 117/56 123/60 112/56 145/73   Pulse: 80 85 92 97   Resp:       Temp:       SpO2: 91% 92% 96% 93%   Weight:       Height:            Physical Exam:  General - Well developed, well nourished, in no apparent distress. Pleasant and conversent. HEENT - Normocephalic, atraumatic. Conjunctiva, tympanic membranes, and oropharynx are clear. Neck - Supple without masses, no bruits   Cardiovascular - Regular rate and rhythm. Normal S1, S2 without murmurs, rubs, or gallops. Lungs - Clear to auscultation. Abdomen - Soft, nontender with normal bowel sounds. Extremities - Peripheral pulses intact. No edema and no rashes. Neurological examination - Comprehension, attention, memory and reasoning are intact. Language and speech are normal.   On cranial nerve examination, (II, III, IV, VI) pupils are equal, round, and reactive to light. Visual acuity is adequate. Visual fields are full to finger confrontation.  Extraocular motility is normal. Patient makes unusual fluttering and eye rolling movements during exam, which does not appear pathologic (V, VII) Face is symmetric and sensation is intact to light touch. (VIII) Hearing is intact. (IX, X) Palate and uvula elevate symmetrically. Voice is normal. (XI) Shoulder shrug is strong and equal bilaterally. (XII)Tongue is midline. Motor examination - There is normal muscle tone and bulk. Power is full throughout. Muscle stretch reflexes are normoactive and there are no pathological reflexes present. Plantar response is flexor. Sensation is intact to light touch, pinprick, vibration and proprioception in all extremities. Cerebellar examination is normal.       Lab Results   Component Value Date/Time    Cholesterol, total 253 (H) 08/31/2017 11:21 AM    HDL Cholesterol 41 08/31/2017 11:21 AM    LDL,Direct 120 (H) 03/14/2012 06:46 AM    LDL, calculated Comment 08/31/2017 11:21 AM    VLDL, calculated Comment 08/31/2017 11:21 AM    Triglyceride 424 (H) 08/31/2017 11:21 AM    CHOL/HDL Ratio 5.9 03/14/2012 06:46 AM        Lab Results   Component Value Date/Time    Hemoglobin A1c 8.0 (H) 07/11/2018 02:35 PM    Hemoglobin A1c, External 7.9 05/29/2015            Results for orders placed or performed during the hospital encounter of 08/07/17   EKG, 12 LEAD, INITIAL   Result Value Ref Range    Ventricular Rate 90 BPM    Atrial Rate 90 BPM    P-R Interval 150 ms    QRS Duration 86 ms    Q-T Interval 414 ms    QTC Calculation (Bezet) 506 ms    Calculated P Axis 81 degrees    Calculated R Axis 55 degrees    Calculated T Axis 81 degrees    Diagnosis       !! AGE AND GENDER SPECIFIC ECG ANALYSIS !!   Normal sinus rhythm  Prolonged QT  Abnormal ECG  When compared with ECG of 24-APR-2013 10:36,  Nonspecific T wave abnormality no longer evident in Inferior leads  Nonspecific T wave abnormality, improved in Anterolateral leads  QT has lengthened  Confirmed by Andrew Herrera MD (), JOSE MARCOS ([id number]) on 8/8/2017 7:08:33 AM     Results for orders placed or performed in visit on 04/25/17   AMB POC EKG ROUTINE W/ 12 LEADS, INTER & REP    Impression    NSR with poor R wave progression; otherwise normal.        MRI Results (most recent):   Results from Hospital Encounter encounter on 07/08/19   MRI BRAIN WO CONT    Narrative MRI OF THE BRAIN WITHOUT CONTRAST    HISTORY: Central origin vertigo    COMPARISON: None. TECHNIQUE: Multiplanar multiecho imaging was performed without intravenous  contrast on a 1.5 [facility] MRI scanner. FINDINGS:   * BRAIN:  -  No acute infarction, hematoma, or mass. -  No hydrocephalus.   -  No white matter abnormality.  -  Unremarkable sella turcica and craniocervical junction. * PARANASAL SINUSES: Clear  * MASTOIDS: Clear  * CALVARIUM AND SCALP: No calvarial or scalp abnormality is seen. * OTHER FINDINGS: None. Impression IMPRESSION:     Unremarkable MRI brain without contrast.    Date Of Dictation: 7/8/2019 11:11 AM              Assessment:     59year old female with complaint of vertigo and bilateral tinnitus/aural fullness. MRI of brain is negative and exam is not consistent with a central cause of vertigo. Symptoms may be consistent with a peripheral cause such as Meniere's, however bilateral hearing loss is uncommon. BPPV is also unlikely given the length of time symptoms are present with each episode.      Plan:     Continue supportive care  Referral to ENT as an outpatient     Signed By: Nunu Flood NP     July 8, 2019 PRINCIPAL DISCHARGE DIAGNOSIS  Diagnosis: Chest pain, unspecified type  Assessment and Plan of Treatment: you stress and echocardiogram are normal. followup with PMD in 1-2 weeks.  please take xarelto for 30 days given hospital admission to prevent blood clot. stop takinig xarelto after 30 days      SECONDARY DISCHARGE DIAGNOSES  Diagnosis: Shingles rash  Assessment and Plan of Treatment: continue antiviral for 7 days after discharge, continue gabapentin as directed. it would take couple days for neurotin to take effect, discuss with your PMD if gabapentin need to be increased if no improvement    Diagnosis: Diabetes mellitus  Assessment and Plan of Treatment: Hypoglycemia management: please check your fingerstick every morning or if you are not feeling well. If your FS is >300mg/dl X3 or more readings please contact your PMD/Endocrinologist. If your FS is low <70mg/dl and/or you have symptoms of very low blood sugar FIRST drink1/2 cup of apple juice (or take 4 glucose tab/tube of glucose gel) and recheck FS in 15mins. Repeat these steps until blood sugar is above 100mg/dl, if NECESSARY. Then call your provider to discuss low blood sugar.   What to expend at followup appointment: please bring a log of your fingerstick and/or your glucometer to you appointment. Your blood sugar tracking will help your diabetes team determine the best plan    Diagnosis: Hypertension, unspecified type  Assessment and Plan of Treatment: Low sodium and fat diet, continue anti-hypertensive medications, and follow up with primary care physician.    Diagnosis: Abdominal wound dehiscence  Assessment and Plan of Treatment: Cleanser intertriginous folds with luke-warm soap and water, dry well. Apply Interdry textile sheeting, under intertriginous folds leaving 2 inches exposed at ends to wick, remove to wash & dry affected area, then replace. Individual sheeting may be used for up to 5 days unless soiled.   Right upper and lower abdominal quadrant- gently cleanse wound and periwound skin with NS. Pat dry. Apply Liquid barrier film to periwound skin. Apply Silicone contact layer to wound base, cover with large silicone foam with border. Change every-other day. Apply abdominal binder.  Continue bariatric low air loss bed therapy, continue heel elevation with Z-flex fluidized positioning boots, continue to turn & reposition q2h with Z-edwin fluidized positioning device, continue use of single breathable pad, continue measures to decrease friction/shear/pressure.   follow up care at [facility]: [phone]. Address: [address] [place] [address].      Diagnosis: CAD (coronary artery disease)  Assessment and Plan of Treatment: Continue aspirin, do not stop unless instructed by your physician.  Continue low salt, fat, cholesterol and carbohydrate diet. Follow up with cardiologist and primary care physician's routine appointment.     PRINCIPAL DISCHARGE DIAGNOSIS  Diagnosis: Chest pain, unspecified type  Assessment and Plan of Treatment: you stress and echocardiogram are normal. followup with PMD in 1-2 weeks.  please take xarelto for 30 days given hospital admission to prevent blood clot. stop takinig xarelto after 30 days      SECONDARY DISCHARGE DIAGNOSES  Diagnosis: Shingles rash  Assessment and Plan of Treatment: continue antiviral for 7 days after discharge, continue lyricaas directed, discuss with your PMD if lyrica need to be increased if no improvement    Diagnosis: Diabetes mellitus  Assessment and Plan of Treatment: Hypoglycemia management: please check your fingerstick every morning or if you are not feeling well. If your FS is >300mg/dl X3 or more readings please contact your PMD/Endocrinologist. If your FS is low <70mg/dl and/or you have symptoms of very low blood sugar FIRST drink1/2 cup of apple juice (or take 4 glucose tab/tube of glucose gel) and recheck FS in 15mins. Repeat these steps until blood sugar is above 100mg/dl, if NECESSARY. Then call your provider to discuss low blood sugar.   What to expend at followup appointment: please bring a log of your fingerstick and/or your glucometer to you appointment. Your blood sugar tracking will help your diabetes team determine the best plan    Diagnosis: Hypertension, unspecified type  Assessment and Plan of Treatment: Low sodium and fat diet, continue anti-hypertensive medications, and follow up with primary care physician.    Diagnosis: Abdominal wound dehiscence  Assessment and Plan of Treatment: Cleanser intertriginous folds with luke-warm soap and water, dry well. Apply Interdry textile sheeting, under intertriginous folds leaving 2 inches exposed at ends to wick, remove to wash & dry affected area, then replace. Individual sheeting may be used for up to 5 days unless soiled.   Right upper and lower abdominal quadrant- gently cleanse wound and periwound skin with NS. Pat dry. Apply Liquid barrier film to periwound skin. Apply Silicone contact layer to wound base, cover with large silicone foam with border. Change every-other day. Apply abdominal binder.  Continue bariatric low air loss bed therapy, continue heel elevation with Z-flex fluidized positioning boots, continue to turn & reposition q2h with Z-edwin fluidized positioning device, continue use of single breathable pad, continue measures to decrease friction/shear/pressure.   follow up care at [facility]: [phone]. Address: [address] [place] [address].      Diagnosis: CAD (coronary artery disease)  Assessment and Plan of Treatment: Continue aspirin, do not stop unless instructed by your physician.  Continue low salt, fat, cholesterol and carbohydrate diet. Follow up with cardiologist and primary care physician's routine appointment.     PRINCIPAL DISCHARGE DIAGNOSIS  Diagnosis: Chest pain, unspecified type  Assessment and Plan of Treatment: you stress and echocardiogram are normal. followup with PMD in 1-2 weeks.  please take xarelto for 30 days given hospital admission to prevent blood clot. stop takinig xarelto after 30 days      SECONDARY DISCHARGE DIAGNOSES  Diagnosis: Hyponatremia  Assessment and Plan of Treatment: likely due to SIADH, please take salt tab for 3 days and followup with PMD in 1 week for lab check. fluid restriction    Diagnosis: Shingles rash  Assessment and Plan of Treatment: continue antiviral for 7 days after discharge, continue lyricaas directed, discuss with your PMD if lyrica need to be increased if no improvement    Diagnosis: Diabetes mellitus  Assessment and Plan of Treatment: Hypoglycemia management: please check your fingerstick every morning or if you are not feeling well. If your FS is >300mg/dl X3 or more readings please contact your PMD/Endocrinologist. If your FS is low <70mg/dl and/or you have symptoms of very low blood sugar FIRST drink1/2 cup of apple juice (or take 4 glucose tab/tube of glucose gel) and recheck FS in 15mins. Repeat these steps until blood sugar is above 100mg/dl, if NECESSARY. Then call your provider to discuss low blood sugar.   What to expend at followup appointment: please bring a log of your fingerstick and/or your glucometer to you appointment. Your blood sugar tracking will help your diabetes team determine the best plan    Diagnosis: Hypertension, unspecified type  Assessment and Plan of Treatment: Low sodium and fat diet, continue anti-hypertensive medications, and follow up with primary care physician.    Diagnosis: Abdominal wound dehiscence  Assessment and Plan of Treatment: Cleanser intertriginous folds with luke-warm soap and water, dry well. Apply Interdry textile sheeting, under intertriginous folds leaving 2 inches exposed at ends to wick, remove to wash & dry affected area, then replace. Individual sheeting may be used for up to 5 days unless soiled.   Right upper and lower abdominal quadrant- gently cleanse wound and periwound skin with NS. Pat dry. Apply Liquid barrier film to periwound skin. Apply Silicone contact layer to wound base, cover with large silicone foam with border. Change every-other day. Apply abdominal binder.  Continue bariatric low air loss bed therapy, continue heel elevation with Z-flex fluidized positioning boots, continue to turn & reposition q2h with Z-edwin fluidized positioning device, continue use of single breathable pad, continue measures to decrease friction/shear/pressure.   follow up care at [facility]: [phone]. Address: [address] [place] [address].      Diagnosis: CAD (coronary artery disease)  Assessment and Plan of Treatment: Continue aspirin, do not stop unless instructed by your physician.  Continue low salt, fat, cholesterol and carbohydrate diet. Follow up with cardiologist and primary care physician's routine appointment.

## 2022-03-18 PROBLEM — R65.10 SIRS (SYSTEMIC INFLAMMATORY RESPONSE SYNDROME) (HCC): Status: ACTIVE | Noted: 2017-08-08

## 2022-03-19 PROBLEM — R53.83 FATIGUE: Status: ACTIVE | Noted: 2017-06-07

## 2022-03-19 PROBLEM — E86.9 VOLUME DEPLETION: Status: ACTIVE | Noted: 2017-08-08

## 2022-03-19 PROBLEM — R42 VERTIGO: Status: ACTIVE | Noted: 2019-07-08

## 2022-03-19 PROBLEM — R05.3 PERSISTENT COUGH: Status: ACTIVE | Noted: 2020-06-11

## 2022-03-19 PROBLEM — A08.4 VIRAL GASTROENTERITIS: Status: ACTIVE | Noted: 2017-08-08

## 2022-03-19 PROBLEM — R55 SYNCOPE: Status: ACTIVE | Noted: 2017-04-25

## 2022-03-19 PROBLEM — J38.3 VOCAL CORD DYSFUNCTION: Status: ACTIVE | Noted: 2020-06-11

## 2022-03-19 PROBLEM — M54.50 ACUTE MIDLINE LOW BACK PAIN WITHOUT SCIATICA: Status: ACTIVE | Noted: 2017-04-25

## 2022-03-20 PROBLEM — M25.561 ACUTE PAIN OF RIGHT KNEE: Status: ACTIVE | Noted: 2018-04-02

## 2022-03-20 PROBLEM — I95.9 HYPOTENSION: Status: ACTIVE | Noted: 2017-08-08

## 2023-05-10 RX ORDER — POTASSIUM CHLORIDE 750 MG/1
10 CAPSULE, EXTENDED RELEASE ORAL 2 TIMES DAILY
COMMUNITY
Start: 2018-10-25

## 2023-05-10 RX ORDER — ONDANSETRON 4 MG/1
4 TABLET, FILM COATED ORAL EVERY 8 HOURS PRN
COMMUNITY

## 2023-05-10 RX ORDER — ALBUTEROL SULFATE 2.5 MG/3ML
2.5 SOLUTION RESPIRATORY (INHALATION) 4 TIMES DAILY
COMMUNITY
Start: 2020-05-28

## 2023-05-10 RX ORDER — BUDESONIDE 0.5 MG/2ML
500 INHALANT ORAL 2 TIMES DAILY
COMMUNITY
Start: 2020-05-28

## 2023-05-10 RX ORDER — LIDOCAINE HYDROCHLORIDE 40 MG/ML
SOLUTION TOPICAL
COMMUNITY
Start: 2020-06-02

## 2023-05-10 RX ORDER — GUAIFENESIN 600 MG/1
600 TABLET, EXTENDED RELEASE ORAL 2 TIMES DAILY
COMMUNITY

## 2023-05-10 RX ORDER — DULAGLUTIDE 1.5 MG/.5ML
1.5 INJECTION, SOLUTION SUBCUTANEOUS
COMMUNITY
Start: 2020-04-15

## 2023-05-10 RX ORDER — AMLODIPINE BESYLATE 5 MG/1
5 TABLET ORAL DAILY
COMMUNITY
Start: 2018-10-25

## 2023-05-10 RX ORDER — OMEPRAZOLE 20 MG/1
1 CAPSULE, DELAYED RELEASE ORAL DAILY
COMMUNITY
Start: 2020-05-18

## 2023-05-10 RX ORDER — LOSARTAN POTASSIUM AND HYDROCHLOROTHIAZIDE 25; 100 MG/1; MG/1
1 TABLET ORAL DAILY
COMMUNITY
Start: 2018-10-25

## 2023-05-10 RX ORDER — ALBUTEROL SULFATE 90 UG/1
2 AEROSOL, METERED RESPIRATORY (INHALATION) EVERY 4 HOURS PRN
COMMUNITY
Start: 2020-03-25

## 2023-05-10 RX ORDER — MONTELUKAST SODIUM 10 MG/1
1 TABLET ORAL NIGHTLY
COMMUNITY
Start: 2020-05-25

## 2023-05-10 RX ORDER — GABAPENTIN 300 MG/1
CAPSULE ORAL
COMMUNITY
Start: 2020-05-18

## 2023-05-10 RX ORDER — ATORVASTATIN CALCIUM 20 MG/1
20 TABLET, FILM COATED ORAL DAILY
COMMUNITY
Start: 2018-10-25

## 2023-05-10 RX ORDER — REVEFENACIN 175 UG/3ML
175 SOLUTION RESPIRATORY (INHALATION) DAILY
COMMUNITY

## 2023-05-10 RX ORDER — MECLIZINE HYDROCHLORIDE 25 MG/1
25 TABLET ORAL 3 TIMES DAILY PRN
COMMUNITY
Start: 2017-08-31
